# Patient Record
Sex: MALE | Race: WHITE | NOT HISPANIC OR LATINO | Employment: OTHER | URBAN - METROPOLITAN AREA
[De-identification: names, ages, dates, MRNs, and addresses within clinical notes are randomized per-mention and may not be internally consistent; named-entity substitution may affect disease eponyms.]

---

## 2018-01-25 ENCOUNTER — OFFICE VISIT (OUTPATIENT)
Dept: UROLOGY | Facility: MEDICAL CENTER | Age: 73
End: 2018-01-25
Payer: MEDICARE

## 2018-01-25 ENCOUNTER — TELEPHONE (OUTPATIENT)
Dept: UROLOGY | Facility: AMBULATORY SURGERY CENTER | Age: 73
End: 2018-01-25

## 2018-01-25 VITALS
DIASTOLIC BLOOD PRESSURE: 84 MMHG | SYSTOLIC BLOOD PRESSURE: 150 MMHG | HEIGHT: 70 IN | BODY MASS INDEX: 34.36 KG/M2 | WEIGHT: 240 LBS

## 2018-01-25 DIAGNOSIS — N13.8 BPH WITH OBSTRUCTION/LOWER URINARY TRACT SYMPTOMS: ICD-10-CM

## 2018-01-25 DIAGNOSIS — N40.1 BPH WITH OBSTRUCTION/LOWER URINARY TRACT SYMPTOMS: ICD-10-CM

## 2018-01-25 DIAGNOSIS — R33.9 RETENTION, URINE: Primary | ICD-10-CM

## 2018-01-25 DIAGNOSIS — N30.01 ACUTE CYSTITIS WITH HEMATURIA: ICD-10-CM

## 2018-01-25 LAB
SL AMB  POCT GLUCOSE, UA: ABNORMAL
SL AMB LEUKOCYTE ESTERASE,UA: ABNORMAL
SL AMB POCT BILIRUBIN,UA: ABNORMAL
SL AMB POCT BLOOD,UA: ABNORMAL
SL AMB POCT CLARITY,UA: CLEAR
SL AMB POCT COLOR,UA: YELLOW
SL AMB POCT KETONES,UA: ABNORMAL
SL AMB POCT NITRITE,UA: ABNORMAL
SL AMB POCT PH,UA: 6
SL AMB POCT SPECIFIC GRAVITY,UA: 1.02

## 2018-01-25 PROCEDURE — 99204 OFFICE O/P NEW MOD 45 MIN: CPT | Performed by: UROLOGY

## 2018-01-25 PROCEDURE — 51798 US URINE CAPACITY MEASURE: CPT | Performed by: UROLOGY

## 2018-01-25 PROCEDURE — 81002 URINALYSIS NONAUTO W/O SCOPE: CPT | Performed by: UROLOGY

## 2018-01-25 RX ORDER — SULFAMETHOXAZOLE AND TRIMETHOPRIM 800; 160 MG/1; MG/1
TABLET ORAL
COMMUNITY
Start: 2018-01-24 | End: 2018-03-08

## 2018-01-25 NOTE — PROGRESS NOTES
Assessment/Plan:  1  Recent bout of acute cystitis initially treated with Cipro but when symptoms of dysuria frequency or urgency and stranguria persisted the patient was then placed on Bactrim DS with resolution of his symptoms  Will plan renal ultrasound to evaluate the upper urinary tract, postvoid urinary residual volume today was 180 cc which is elevated, we will plan cystourethroscopy in the office in about 6 weeks after data collection  2  BPH with obstructive symptoms-the patient notes a longstanding history of nocturia twice per night weakening of the urinary stream urinary hesitancy and intermittency with double voiding  The patient has had at least 1 bout of acute cystitis  He is currently treated with Cardura 8 mg p o  daily but unfortunately still has an elevated PVR, a recent urinary infection, and persistent obstructive symptoms  3   Retention of urine- cc    No problem-specific Assessment & Plan notes found for this encounter  Diagnoses and all orders for this visit:    Retention, urine  -     POCT urine dip  -     Measure post void residual    BPH with obstruction/lower urinary tract symptoms    Acute cystitis with hematuria    Other orders  -     sulfamethoxazole-trimethoprim (BACTRIM DS) 800-160 mg per tablet;           Subjective:      Patient ID: Brien Quintana is a 71 y o  male  HPI 69-year-old male seen last in this office in October of 2015 for BPH with obstructive symptoms and microscopic hematuria failing to return to the office for cystourethroscopy  The patient developed dysuria frequency urgency stranguria approximately 1 week ago went to the physician who placed him initially on ciprofloxacin but after his symptoms persisted change the prescription to Bactrim DS which result in resolution    On the baseline the patient notes nocturia twice per night with urinary hesitancy intermittency double voiding mild frequency and it weakening of the urinary stream   The patient now presents for urologic evaluation and treatment  The following portions of the patient's history were reviewed and updated as appropriate: allergies, current medications, past family history, past medical history, past social history, past surgical history and problem list     Review of Systems   Constitutional: Positive for appetite change, chills and diaphoresis  HENT: Negative  Eyes: Negative  Respiratory: Negative  Cardiovascular: Negative  Gastrointestinal: Negative  Genitourinary: Positive for decreased urine volume, difficulty urinating, dysuria and frequency  Musculoskeletal:        Low back pain   Skin: Negative  Objective:     Physical Exam   Constitutional: He is oriented to person, place, and time  He appears well-nourished  HENT:   Head: Atraumatic  Eyes: EOM are normal    Neck: Neck supple  Cardiovascular: Normal rate  Pulmonary/Chest: Breath sounds normal  No respiratory distress  He has no wheezes  Abdominal: Bowel sounds are normal  He exhibits no distension  There is no tenderness  There is no guarding  Genitourinary: Testes normal and penis normal  Right testis shows no mass, no swelling and no tenderness  Left testis shows no mass, no swelling and no tenderness  Circumcised  No phimosis or penile tenderness  Neurological: He is oriented to person, place, and time  Skin: Skin is dry  Psychiatric: He has a normal mood and affect  His behavior is normal  Judgment and thought content normal    Vitals reviewed

## 2018-02-08 ENCOUNTER — HOSPITAL ENCOUNTER (OUTPATIENT)
Dept: ULTRASOUND IMAGING | Facility: HOSPITAL | Age: 73
Discharge: HOME | End: 2018-02-08
Attending: UROLOGY
Payer: MEDICARE

## 2018-02-08 ENCOUNTER — APPOINTMENT (OUTPATIENT)
Dept: LAB | Facility: HOSPITAL | Age: 73
End: 2018-02-08
Attending: UROLOGY
Payer: MEDICARE

## 2018-02-08 DIAGNOSIS — N40.1 BPH WITH OBSTRUCTION/LOWER URINARY TRACT SYMPTOMS: ICD-10-CM

## 2018-02-08 DIAGNOSIS — N30.01 ACUTE CYSTITIS WITH HEMATURIA: ICD-10-CM

## 2018-02-08 DIAGNOSIS — N13.8 BPH WITH OBSTRUCTION/LOWER URINARY TRACT SYMPTOMS: ICD-10-CM

## 2018-02-08 LAB
ALBUMIN SERPL BCP-MCNC: 3.7 G/DL (ref 3.5–5)
ALP SERPL-CCNC: 59 U/L (ref 46–116)
ALT SERPL W P-5'-P-CCNC: 35 U/L (ref 12–78)
ANION GAP SERPL CALCULATED.3IONS-SCNC: 7 MMOL/L (ref 4–13)
AST SERPL W P-5'-P-CCNC: 25 U/L (ref 5–45)
BILIRUB SERPL-MCNC: 0.62 MG/DL (ref 0.2–1)
BUN SERPL-MCNC: 13 MG/DL (ref 5–25)
CALCIUM SERPL-MCNC: 9.6 MG/DL (ref 8.3–10.1)
CHLORIDE SERPL-SCNC: 104 MMOL/L (ref 100–108)
CO2 SERPL-SCNC: 31 MMOL/L (ref 21–32)
CREAT SERPL-MCNC: 1.01 MG/DL (ref 0.6–1.3)
GFR SERPL CREATININE-BSD FRML MDRD: 74 ML/MIN/1.73SQ M
GLUCOSE P FAST SERPL-MCNC: 97 MG/DL (ref 65–99)
POTASSIUM SERPL-SCNC: 4.7 MMOL/L (ref 3.5–5.3)
PROT SERPL-MCNC: 7.6 G/DL (ref 6.4–8.2)
SODIUM SERPL-SCNC: 142 MMOL/L (ref 136–145)

## 2018-02-08 PROCEDURE — 84153 ASSAY OF PSA TOTAL: CPT

## 2018-02-08 PROCEDURE — 87086 URINE CULTURE/COLONY COUNT: CPT | Performed by: UROLOGY

## 2018-02-08 PROCEDURE — 51798 US URINE CAPACITY MEASURE: CPT

## 2018-02-08 PROCEDURE — 36415 COLL VENOUS BLD VENIPUNCTURE: CPT

## 2018-02-08 PROCEDURE — 84154 ASSAY OF PSA FREE: CPT

## 2018-02-08 PROCEDURE — 80053 COMPREHEN METABOLIC PANEL: CPT

## 2018-02-09 LAB
BACTERIA UR CULT: NORMAL
PSA FREE MFR SERPL: 13.8 %
PSA FREE SERPL-MCNC: 1.39 NG/ML
PSA SERPL-MCNC: 10.1 NG/ML (ref 0–4)

## 2018-03-08 ENCOUNTER — PROCEDURE VISIT (OUTPATIENT)
Dept: UROLOGY | Facility: MEDICAL CENTER | Age: 73
End: 2018-03-08
Payer: MEDICARE

## 2018-03-08 VITALS
SYSTOLIC BLOOD PRESSURE: 120 MMHG | WEIGHT: 237 LBS | HEIGHT: 70 IN | DIASTOLIC BLOOD PRESSURE: 74 MMHG | BODY MASS INDEX: 33.93 KG/M2

## 2018-03-08 DIAGNOSIS — R97.20 ELEVATED PSA, BETWEEN 10 AND LESS THAN 20 NG/ML: ICD-10-CM

## 2018-03-08 DIAGNOSIS — N35.914 ANTERIOR URETHRAL STRICTURE: ICD-10-CM

## 2018-03-08 DIAGNOSIS — N40.1 BPH WITH OBSTRUCTION/LOWER URINARY TRACT SYMPTOMS: Primary | ICD-10-CM

## 2018-03-08 DIAGNOSIS — N13.8 BPH WITH OBSTRUCTION/LOWER URINARY TRACT SYMPTOMS: Primary | ICD-10-CM

## 2018-03-08 DIAGNOSIS — R33.9 RETENTION OF URINE: ICD-10-CM

## 2018-03-08 LAB
SL AMB  POCT GLUCOSE, UA: NORMAL
SL AMB LEUKOCYTE ESTERASE,UA: NORMAL
SL AMB POCT BILIRUBIN,UA: NORMAL
SL AMB POCT BLOOD,UA: NORMAL
SL AMB POCT CLARITY,UA: CLEAR
SL AMB POCT COLOR,UA: YELLOW
SL AMB POCT KETONES,UA: NORMAL
SL AMB POCT NITRITE,UA: NORMAL
SL AMB POCT PH,UA: 6
SL AMB POCT SPECIFIC GRAVITY,UA: 1.01
SL AMB POCT URINE PROTEIN: NORMAL
SL AMB POCT UROBILINOGEN: 0.2

## 2018-03-08 PROCEDURE — 99214 OFFICE O/P EST MOD 30 MIN: CPT | Performed by: UROLOGY

## 2018-03-08 PROCEDURE — 52281 CYSTOSCOPY AND TREATMENT: CPT | Performed by: UROLOGY

## 2018-03-08 PROCEDURE — 81003 URINALYSIS AUTO W/O SCOPE: CPT | Performed by: UROLOGY

## 2018-03-08 RX ORDER — CIPROFLOXACIN 500 MG/1
500 TABLET, FILM COATED ORAL EVERY 12 HOURS SCHEDULED
Qty: 6 TABLET | Refills: 0 | Status: SHIPPED | OUTPATIENT
Start: 2018-03-08 | End: 2018-03-11

## 2018-03-08 RX ORDER — LEVOFLOXACIN 500 MG/1
TABLET, FILM COATED ORAL
COMMUNITY
Start: 2018-02-20 | End: 2018-03-08

## 2018-03-08 RX ORDER — DOXAZOSIN 8 MG/1
8 TABLET ORAL
Qty: 90 TABLET | Refills: 3 | Status: SHIPPED | OUTPATIENT
Start: 2018-03-08 | End: 2018-04-26

## 2018-03-08 RX ORDER — METHYLPREDNISOLONE 4 MG/1
TABLET ORAL
COMMUNITY
Start: 2018-02-20 | End: 2018-03-22

## 2018-03-08 RX ORDER — DOXAZOSIN 8 MG/1
8 TABLET ORAL
COMMUNITY
End: 2018-03-08 | Stop reason: SDUPTHER

## 2018-03-08 RX ORDER — AZITHROMYCIN 250 MG/1
TABLET, FILM COATED ORAL
COMMUNITY
Start: 2018-02-16 | End: 2018-03-08

## 2018-03-08 NOTE — LETTER
March 8, 2018     Lilliam Acosta  [address] [place] [id number]    Patient: Lenora Martini   YOB: 1945   Date of Visit: 3/8/2018       Dear Dr Arnaldo Coles:    Thank you for referring Yahaira Haas to me for evaluation  Below are my notes for this consultation  If you have questions, please do not hesitate to call me  I look forward to following your patient along with you  Sincerely,        Li Arriaga MD        CC: No Recipients  Li Arriaga MD  3/8/2018 11:23 AM  Sign at close encounter  H&P Exam - Urology   Lenora Martini 71 y o  male MRN: [id number]  Unit/Bed#:  Encounter: [id number]    Assessment/Plan     Assessment:  1  Elevated PSA  The patient had a PSA in excess of 10 as well as a firm area of the prostate at the left apex  Based on both these findings even though the PSA may be elevated due to a recent bout of acute cystitis, prostate biopsy is recommended will be performed  2   BPH with bladder outlet obstructive symptoms  The patient noted relief of the dysuria urgency and frequency but noted persistent weakened urinary stream and incomplete bladder emptying  Witnessed voiding showed a very weak stream something more than a dribble but not with a lot of power with thigh intermittency and some straining  Cystoscopy revealed trilobar hyperplasia of the prostate with a prominent median lobe bladder trabeculation with no bladder lesions  3   Anterior urethral stricture in the fossa navicularis  This was incidentally noted and was dilated easily  4  Recent acute cystitis with successful treatment with antibiotics  5   Recent history of gross hematuria with gross hematuria identified on cystoscopy today  CT urogram will also be obtained  Renal ultrasound revealed normal upper urinary tracts with a large prostate protruding into the bladder with bladder wall thickening       Plan:  1    Transrectal needle biopsy of the prostate under ultrasound guidance  2  TURP  3  CT urogram    History of Present Illness   HPI:  Ronaldo Mishra is a 71 y o  male who presents with urinary retention  The patient noted an obstructive weak urinary stream with multiple bouts of urgency frequency and at least 2 urinary tract infections treated with antibiotics  The patient noted urinary hesitancy intermittency double voiding nocturia 3-4 times per night and at least 1 episode of gross hematuria at the time of his cystoscopy  The patient underwent renal ultrasound which revealed normal upper urinary tracts but did show trilobar hyperplasia of the prostate with bladder wall thickening  The patient underwent cystoscopy in the office today which confirmed a fossa navicularis anterior urethral stricture which was dilated easily as well as trilobar hyperplasia of the prostate with visual occlusion of the bladder outlet  The patient had an elevated PSA of over 10 and this may be due to recent infection however he has a firm left apex of the prostate and therefore prostate biopsy to also be recommended       Review of Systems    Historical Information   History reviewed  No pertinent past medical history  Past Surgical History:   Procedure Laterality Date    REPLACEMENT TOTAL KNEE BILATERAL       Social History   History   Alcohol Use    0 6 oz/week    1 Shots of liquor per week     History   Drug Use No     History   Smoking Status    Former Smoker   Smokeless Tobacco    Never Used     Family History:   Family History   Problem Relation Age of Onset    Family history unknown: Yes       Meds/Allergies   all medications and allergies reviewed  No Known Allergies    Objective   Vitals: Blood pressure 120/74, height 5' 10" (1 778 m), weight 108 kg (237 lb)  [unfilled]    Invasive Devices          No matching active lines, drains, or airways          Physical Exam    Lab Results: I have personally reviewed pertinent reports  Dx:  BPH with obstruction/lower urinary tr    Ref Range & Units 2/8/18  9:53 AM Flag   Prostate Specific Antigen Total 0 0 - 4 0 ng/mL 10 1   H            Comprehensive metabolic panel   Order: [id number]   Status:  Final result   Visible to patient:  No (Inaccessible in MyChart)   Next appt:  None   Dx:  BPH with obstruction/lower urinary tr    Ref Range & Units 2/8/18  9:53 AM   Sodium 136 - 145 mmol/L 142    Potassium 3 5 - 5 3 mmol/L 4 7    Chloride 100 - 108 mmol/L 104    CO2 21 - 32 mmol/L 31    Anion Gap 4 - 13 mmol/L 7    BUN 5 - 25 mg/dL 13    Creatinine 0 60 - 1 30 mg/dL 1 01    Comments: Standardized to IDMS reference method   Glucose, Fasting 65 - 99 mg/dL 97                Imaging: I have personally reviewed pertinent reports  Study Result     RENAL ULTRASOUND     INDICATION: Cystitis and microhematuria  Urinary retention     COMPARISON: None      TECHNIQUE:   Ultrasound of the retroperitoneum was performed with a curvilinear transducer utilizing volumetric sweeps and still imaging techniques       FINDINGS:     KIDNEYS:     Right kidney:  13 9 x 5 6 cm  Normal echogenicity and contour  No suspicious masses detected  No hydronephrosis  No shadowing calculi  No perinephric fluid collections      Left kidney:  12 8 x 6 2 cm  Normal echogenicity and contour  No suspicious masses detected  No hydronephrosis  No shadowing calculi  No perinephric fluid collections      URETERS:  Nonvisualized      BLADDER:   Prevoid bladder volume 246 mL  Post void bladder volume 79 mL  Bladder wall thickening and trabeculation observed  There is a soft tissue mass protruding into the urinary bladder base on the midline measuring up to 3 0 cm x 3 7 cm x 2 2 cm  This appears to be enlarged fibromuscular stroma of the prostate gland           IMPRESSION:  Unremarkable kidneys  Urinary Bladder wall thickening which may be due to cystitis and/or trabeculation    79 mL post void bladder residual        Soft tissue mass in the base of the urinary bladder, likely hypertrophied prostatic anterior fibromuscular stroma protruding into the urinary bladder base      Follow-up hematuria protocol CT advised if hematuria persists  EKG, Pathology, and Other Studies: I have personally reviewed pertinent reports  VTE Prophylaxis: Sequential compression device Daniel Andrae)     Code Status: [unfilled]  Advance Directive and Living Will:      Power of :    POLST:    Cystoscopy  Date/Time: 3/8/2018 11:21 AM  Performed by: Moncho Lea  Authorized by: Moncho Lea     Procedure details: dilation of urethral stricture    Patient tolerance: Patient tolerated the procedure well with no immediate complications    Additional Procedure Details: The patient was taken to the cystoscopy suite placed in the supine and then the male lithotomy position and after being appropriately be prepped with Betadine 2% lidocaine lubricant was injected up the urethra  Initial attempted cystourethroscopy failed because of a fossa navicularis stricture which was easily dilated using female sounds from 8 Western Yamilet up to 22 Western Yamilet size inclusive Keira  Cystourethroscopy was then performed using a flexible cystoscope  Other than the fossa navicularis stricture which was already dilated the remainder the urethra was within normal limits without stricture lesion  The prostatic urethra revealed trilobar hyperplasia of the prostate with visual occlusion of the bladder outlet  There was a prominent median lobe obstructing the bladder outlet  The bladder was mildly to moderately trabeculated without intrinsic lesions or evidence of extrinsic mass compression  Ureteral orifices were normal position and configuration bilaterally with clear efflux bilaterally  The patient tolerated the procedure quite well  Counseling / Coordination of Care  Total floor / unit time spent today 45 minutes    Greater than 50% of total time was spent with the patient and / or family counseling and / or coordination of care  A description of the counseling / coordination of care: Sami Moss

## 2018-03-08 NOTE — PROGRESS NOTES
H&P Exam - Urology   Yuilya Ibarra 71 y o  male MRN: [id number]  Unit/Bed#:  Encounter: [id number]    Assessment/Plan     Assessment:  1  Elevated PSA  The patient had a PSA in excess of 10 as well as a firm area of the prostate at the left apex  Based on both these findings even though the PSA may be elevated due to a recent bout of acute cystitis, prostate biopsy is recommended will be performed  2   BPH with bladder outlet obstructive symptoms  The patient noted relief of the dysuria urgency and frequency but noted persistent weakened urinary stream and incomplete bladder emptying  Witnessed voiding showed a very weak stream something more than a dribble but not with a lot of power with thigh intermittency and some straining  Cystoscopy revealed trilobar hyperplasia of the prostate with a prominent median lobe bladder trabeculation with no bladder lesions  3   Anterior urethral stricture in the fossa navicularis  This was incidentally noted and was dilated easily  4  Recent acute cystitis with successful treatment with antibiotics  5   Recent history of gross hematuria with gross hematuria identified on cystoscopy today  CT urogram will also be obtained  Renal ultrasound revealed normal upper urinary tracts with a large prostate protruding into the bladder with bladder wall thickening       Plan:  1  Transrectal needle biopsy of the prostate under ultrasound guidance  2  TURP  3  CT urogram    History of Present Illness   HPI:  Yuliya Ibarra is a 71 y o  male who presents with urinary retention  The patient noted an obstructive weak urinary stream with multiple bouts of urgency frequency and at least 2 urinary tract infections treated with antibiotics  The patient noted urinary hesitancy intermittency double voiding nocturia 3-4 times per night and at least 1 episode of gross hematuria at the time of his cystoscopy    The patient underwent renal ultrasound which revealed normal upper urinary tracts but did show trilobar hyperplasia of the prostate with bladder wall thickening  The patient underwent cystoscopy in the office today which confirmed a fossa navicularis anterior urethral stricture which was dilated easily as well as trilobar hyperplasia of the prostate with visual occlusion of the bladder outlet  The patient had an elevated PSA of over 10 and this may be due to recent infection however he has a firm left apex of the prostate and therefore prostate biopsy to also be recommended       Review of Systems    Historical Information   History reviewed  No pertinent past medical history  Past Surgical History:   Procedure Laterality Date    REPLACEMENT TOTAL KNEE BILATERAL       Social History   History   Alcohol Use    0 6 oz/week    1 Shots of liquor per week     History   Drug Use No     History   Smoking Status    Former Smoker   Smokeless Tobacco    Never Used     Family History:   Family History   Problem Relation Age of Onset    Family history unknown: Yes       Meds/Allergies   all medications and allergies reviewed  No Known Allergies    Objective   Vitals: Blood pressure 120/74, height 5' 10" (1 778 m), weight 108 kg (237 lb)  [unfilled]    Invasive Devices          No matching active lines, drains, or airways          Physical Exam    Lab Results: I have personally reviewed pertinent reports  Dx:  BPH with obstruction/lower urinary tr    Ref Range & Units 2/8/18  9:53 AM Flag   Prostate Specific Antigen Total 0 0 - 4 0 ng/mL 10 1   H            Comprehensive metabolic panel   Order: [id number]   Status:  Final result   Visible to patient:  No (Inaccessible in MyChart)   Next appt:  None   Dx:  BPH with obstruction/lower urinary tr        Ref Range & Units 2/8/18  9:53 AM   Sodium 136 - 145 mmol/L 142    Potassium 3 5 - 5 3 mmol/L 4 7    Chloride 100 - 108 mmol/L 104    CO2 21 - 32 mmol/L 31    Anion Gap 4 - 13 mmol/L 7    BUN 5 - 25 mg/dL 13    Creatinine 0 60 - 1 30 mg/dL 1 01    Comments: Standardized to IDMS reference method   Glucose, Fasting 65 - 99 mg/dL 97                Imaging: I have personally reviewed pertinent reports  Study Result     RENAL ULTRASOUND     INDICATION: Cystitis and microhematuria  Urinary retention     COMPARISON: None      TECHNIQUE:   Ultrasound of the retroperitoneum was performed with a curvilinear transducer utilizing volumetric sweeps and still imaging techniques       FINDINGS:     KIDNEYS:     Right kidney:  13 9 x 5 6 cm  Normal echogenicity and contour  No suspicious masses detected  No hydronephrosis  No shadowing calculi  No perinephric fluid collections      Left kidney:  12 8 x 6 2 cm  Normal echogenicity and contour  No suspicious masses detected  No hydronephrosis  No shadowing calculi  No perinephric fluid collections      URETERS:  Nonvisualized      BLADDER:   Prevoid bladder volume 246 mL  Post void bladder volume 79 mL  Bladder wall thickening and trabeculation observed  There is a soft tissue mass protruding into the urinary bladder base on the midline measuring up to 3 0 cm x 3 7 cm x 2 2 cm  This appears to be enlarged fibromuscular stroma of the prostate gland           IMPRESSION:  Unremarkable kidneys  Urinary Bladder wall thickening which may be due to cystitis and/or trabeculation  79 mL post void bladder residual        Soft tissue mass in the base of the urinary bladder, likely hypertrophied prostatic anterior fibromuscular stroma protruding into the urinary bladder base      Follow-up hematuria protocol CT advised if hematuria persists  EKG, Pathology, and Other Studies: I have personally reviewed pertinent reports      VTE Prophylaxis: Sequential compression device [place])     Code Status: [unfilled]  Advance Directive and Living Will:      Power of :    POLST:    Cystoscopy  Date/Time: 3/8/2018 11:21 AM  Performed by: Leo Gilmore  Authorized by: Leo Gilmore Procedure details: dilation of urethral stricture    Patient tolerance: Patient tolerated the procedure well with no immediate complications    Additional Procedure Details: The patient was taken to the cystoscopy suite placed in the supine and then the male lithotomy position and after being appropriately be prepped with Betadine 2% lidocaine lubricant was injected up the urethra  Initial attempted cystourethroscopy failed because of a fossa navicularis stricture which was easily dilated using female sounds from 8 [place] Yamilet up to 22 Western Yamilet size inclusive Keira  Cystourethroscopy was then performed using a flexible cystoscope  Other than the fossa navicularis stricture which was already dilated the remainder the urethra was within normal limits without stricture lesion  The prostatic urethra revealed trilobar hyperplasia of the prostate with visual occlusion of the bladder outlet  There was a prominent median lobe obstructing the bladder outlet  The bladder was mildly to moderately trabeculated without intrinsic lesions or evidence of extrinsic mass compression  Ureteral orifices were normal position and configuration bilaterally with clear efflux bilaterally  The patient tolerated the procedure quite well  Counseling / Coordination of Care  Total floor / unit time spent today 45 minutes  Greater than 50% of total time was spent with the patient and / or family counseling and / or coordination of care  A description of the counseling / coordination of care: Glendy Wilson

## 2018-03-20 ENCOUNTER — PROCEDURE VISIT (OUTPATIENT)
Dept: UROLOGY | Facility: MEDICAL CENTER | Age: 73
End: 2018-03-20
Payer: MEDICARE

## 2018-03-20 VITALS
SYSTOLIC BLOOD PRESSURE: 148 MMHG | DIASTOLIC BLOOD PRESSURE: 84 MMHG | BODY MASS INDEX: 34.36 KG/M2 | HEIGHT: 70 IN | WEIGHT: 240 LBS

## 2018-03-20 DIAGNOSIS — N13.8 BPH WITH OBSTRUCTION/LOWER URINARY TRACT SYMPTOMS: ICD-10-CM

## 2018-03-20 DIAGNOSIS — N40.1 BPH WITH OBSTRUCTION/LOWER URINARY TRACT SYMPTOMS: ICD-10-CM

## 2018-03-20 DIAGNOSIS — R97.20 ELEVATED PSA, BETWEEN 10 AND LESS THAN 20 NG/ML: Primary | ICD-10-CM

## 2018-03-20 DIAGNOSIS — R97.20 ELEVATED PSA: Primary | ICD-10-CM

## 2018-03-20 PROCEDURE — 55700 PR BIOPSY OF PROSTATE,NEEDLE/PUNCH: CPT | Performed by: UROLOGY

## 2018-03-20 PROCEDURE — 76872 US TRANSRECTAL: CPT | Performed by: UROLOGY

## 2018-03-20 PROCEDURE — 88344 IMHCHEM/IMCYTCHM EA MLT ANTB: CPT | Performed by: PATHOLOGY

## 2018-03-20 PROCEDURE — G0416 PROSTATE BIOPSY, ANY MTHD: HCPCS | Performed by: PATHOLOGY

## 2018-03-20 PROCEDURE — 76942 ECHO GUIDE FOR BIOPSY: CPT | Performed by: UROLOGY

## 2018-03-20 PROCEDURE — 99214 OFFICE O/P EST MOD 30 MIN: CPT | Performed by: UROLOGY

## 2018-03-20 RX ORDER — CIPROFLOXACIN 500 MG/1
TABLET, FILM COATED ORAL
COMMUNITY
Start: 2018-03-12 | End: 2018-03-22

## 2018-03-20 NOTE — PROGRESS NOTES
Assessment/Plan:   1  BPH with obstructive symptoms  The patient has a prominent median lobe as well as bilobar enlargement of the lateral lobes of the prostate with significant bladder outlet obstructive symptoms despite being on maximal doses of alpha blocker  The patient will require TURP in the future  2   Elevated PSA-the patient presents today for transrectal needle biopsy the prostate under ultrasound guidance  I again did reveal his renal ultrasound findings and explained those  He will return in 2 weeks to discuss findings on prostate biopsy  Diagnoses and all orders for this visit:    Elevated PSA, between 10 and less than 20 ng/ml    BPH with obstruction/lower urinary tract symptoms    Other orders  -     ciprofloxacin (CIPRO) 500 mg tablet;           Subjective:     Patient ID: Lucio Meza is a 71 y o  male  HPI 78-year-old male with an elevated PSA in excess of 10 as well as a firm left posterior aspect of the prostate on LIN presents for prostate biopsy today  In addition the patient has obstructive voiding symptomatology despite alpha blockade  Trilobar hyperplasia was identified on cystoscopy in the patient will be considered for TURP in the future  Review of Systems   Musculoskeletal: Positive for arthralgias  All other systems reviewed and are negative  Objective:     Physical Exam   Constitutional: He is oriented to person, place, and time  He appears well-developed and well-nourished  No distress  HENT:   Head: Atraumatic  Eyes: EOM are normal    Neck: Neck supple  Cardiovascular: Normal rate and regular rhythm  Pulmonary/Chest: Effort normal  No respiratory distress  Abdominal: Soft  He exhibits no distension  Genitourinary: Penis normal    Neurological: He is alert and oriented to person, place, and time  Psychiatric: He has a normal mood and affect   His behavior is normal  Judgment and thought content normal           Biopsy prostate Date/Time 3/20/2018 5:39 PM     Performed by  Sam Rodriguez     Authorized by Sam Rodriguez       Consent: Verbal consent obtained  Risks and benefits: risks, benefits and alternatives were discussed  Consent given by: patient  Patient understanding: patient states understanding of the procedure being performed  Patient consent: the patient's understanding of the procedure matches consent given  Patient identity confirmed: verbally with patient  Time out: Immediately prior to procedure a "time out" was called to verify the correct patient, procedure, equipment, support staff and site/side marked as required  Local anesthesia used: yes      Anesthesia: local infiltration and nerve block     Anesthesia   Local anesthesia used: yes  Local Anesthetic: lidocaine 1% without epinephrine     Sedation   Patient sedated: no      Specimen: yes    Culture: no   Procedure Details   Procedure Notes: The patient was placed on the exam table in the right flank up position with knees to chest   The ultrasound probe was lubricated and placed per rectum against the prostate  Prostate size was estimated at 28 g by ultrasound measurement  No hypoechoic regions of the peripheral zone were in countered  Prostate did extend through a median lobe into the urinary bladder at with a mass effect consistent with trilobar hyperplasia  After performing a pelvic plexus block using lidocaine 1% bilaterally at the tips of the seminal vesicles apical lidocaine 1% was also deposited bilaterally  After anesthesia was obtained sextant biopsies with 2 cores per sextant 1 medial and 1 lateral were obtained for a total of 12 core biopsies  There was no visible bleeding the patient tolerated the procedure well  He was discharged from the office in good condition    Patient Transportation: confirmed  Patient tolerance: Patient tolerated the procedure well with no immediate complications

## 2018-03-20 NOTE — PATIENT INSTRUCTIONS
Prostate Gland Needle Biopsy   WHAT YOU NEED TO KNOW:   A prostate gland needle biopsy is a procedure to remove samples of tissue from your prostate gland  The prostate is a gland in men located just below the bladder and surrounds the urethra (tube that carries urine out of the body)  After the samples are removed, they are sent to a lab and tested for cancer  DISCHARGE INSTRUCTIONS:   Medicines:   · Antibiotics: This medicine is given to help treat or prevent an infection caused by bacteria  · Pain medicine: You may be given a prescription medicine to decrease pain  Do not wait until the pain is severe before you take this medicine  · Take your medicine as directed  Contact your healthcare provider if you think your medicine is not helping or if you have side effects  Tell him or her if you are allergic to any medicine  Keep a list of the medicines, vitamins, and herbs you take  Include the amounts, and when and why you take them  Bring the list or the pill bottles to follow-up visits  Carry your medicine list with you in case of an emergency  Follow up with your healthcare provider or urologist as directed: You may need to return for more tests or procedures  Write down your questions so you remember to ask them during your visits  Self-care:   · Eat healthy foods:  Healthy foods include fruits, vegetables, whole-grain breads, low-fat dairy products, beans, lean meats, and fish  Eat foods low in animal fat and saturated fats to help decrease your risk for prostate cancer  · Limit alcohol:  You should limit alcohol to 2 drinks a day  A drink of alcohol is 12 ounces of beer, 5 ounces of wine, or 1½ ounces of liquor  Contact your healthcare provider or urologist if:   · You cannot get an erection  · You feel pain or burning when you urinate  · You feel weak, and your face is hot and red  · You have a fever or chills      · You see blood in your urine, bowel movements, or semen     · Your urine is cloudy or smells foul  · You have questions or concerns about your condition or care  Seek care immediately or call 911 if:   · You bleed from your rectum  · You urinate very little or not at all  · You have pain from your procedure that gets worse, even after you take pain medicine  © 2017 [employer] Information is for End User's use only and may not be sold, redistributed or otherwise used for commercial purposes  All illustrations and images included in CareNotes® are the copyrighted property of [employer]  or [address]  The above information is an  only  It is not intended as medical advice for individual conditions or treatments  Talk to your doctor, nurse or pharmacist before following any medical regimen to see if it is safe and effective for you

## 2018-03-20 NOTE — LETTER
March 20, 2018     Wilder Johnson MD  [address] [address]    Patient: Deanna Xei   YOB: 1945   Date of Visit: 3/20/2018       Dear Dr Momo Munoz:    Thank you for referring Valeria Guerra to me for evaluation  Below are my notes for this consultation  If you have questions, please do not hesitate to call me  I look forward to following your patient along with you  Sincerely,        Jo Ann Uribe MD        CC: No Recipients  Jo Ann Uribe MD  3/20/2018  5:42 PM  Sign at close encounter  Assessment/Plan:   1  BPH with obstructive symptoms  The patient has a prominent median lobe as well as bilobar enlargement of the lateral lobes of the prostate with significant bladder outlet obstructive symptoms despite being on maximal doses of alpha blocker  The patient will require TURP in the future  2   Elevated PSA-the patient presents today for transrectal needle biopsy the prostate under ultrasound guidance  I again did reveal his renal ultrasound findings and explained those  He will return in 2 weeks to discuss findings on prostate biopsy  Diagnoses and all orders for this visit:    Elevated PSA, between 10 and less than 20 ng/ml    BPH with obstruction/lower urinary tract symptoms    Other orders  -     ciprofloxacin (CIPRO) 500 mg tablet;           Subjective:     Patient ID: Deanna Xie is a 71 y o  male  HPI 71-year-old male with an elevated PSA in excess of 10 as well as a firm left posterior aspect of the prostate on LIN presents for prostate biopsy today  In addition the patient has obstructive voiding symptomatology despite alpha blockade  Trilobar hyperplasia was identified on cystoscopy in the patient will be considered for TURP in the future  Review of Systems   Musculoskeletal: Positive for arthralgias  All other systems reviewed and are negative          Objective:     Physical Exam   Constitutional: He is oriented to person, place, and time  He appears well-developed and well-nourished  No distress  HENT:   Head: Atraumatic  Eyes: EOM are normal    Neck: Neck supple  Cardiovascular: Normal rate and regular rhythm  Pulmonary/Chest: Effort normal  No respiratory distress  Abdominal: Soft  He exhibits no distension  Genitourinary: Penis normal    Neurological: He is alert and oriented to person, place, and time  Psychiatric: He has a normal mood and affect  His behavior is normal  Judgment and thought content normal           Biopsy prostate     Date/Time 3/20/2018 5:39 PM     Performed by  Dwyane Favre by Bridget Machado       Consent: Verbal consent obtained  Risks and benefits: risks, benefits and alternatives were discussed  Consent given by: patient  Patient understanding: patient states understanding of the procedure being performed  Patient consent: the patient's understanding of the procedure matches consent given  Patient identity confirmed: verbally with patient  Time out: Immediately prior to procedure a "time out" was called to verify the correct patient, procedure, equipment, support staff and site/side marked as required  Local anesthesia used: yes      Anesthesia: local infiltration and nerve block     Anesthesia   Local anesthesia used: yes  Local Anesthetic: lidocaine 1% without epinephrine     Sedation   Patient sedated: no      Specimen: yes    Culture: no   Procedure Details   Procedure Notes: The patient was placed on the exam table in the right flank up position with knees to chest   The ultrasound probe was lubricated and placed per rectum against the prostate  Prostate size was estimated at 28 g by ultrasound measurement  No hypoechoic regions of the peripheral zone were in countered  Prostate did extend through a median lobe into the urinary bladder at with a mass effect consistent with trilobar hyperplasia    After performing a pelvic plexus block using lidocaine 1% bilaterally at the tips of the seminal vesicles apical lidocaine 1% was also deposited bilaterally  After anesthesia was obtained sextant biopsies with 2 cores per sextant 1 medial and 1 lateral were obtained for a total of 12 core biopsies  There was no visible bleeding the patient tolerated the procedure well  He was discharged from the office in good condition    Patient Transportation: confirmed  Patient tolerance: Patient tolerated the procedure well with no immediate complications

## 2018-03-22 ENCOUNTER — HOSPITAL ENCOUNTER (OUTPATIENT)
Dept: NON INVASIVE DIAGNOSTICS | Facility: HOSPITAL | Age: 73
Discharge: HOME | End: 2018-03-22
Attending: UROLOGY
Payer: MEDICARE

## 2018-03-22 ENCOUNTER — APPOINTMENT (OUTPATIENT)
Dept: LAB | Facility: HOSPITAL | Age: 73
End: 2018-03-22
Attending: UROLOGY
Payer: MEDICARE

## 2018-03-22 ENCOUNTER — APPOINTMENT (OUTPATIENT)
Dept: PREADMISSION TESTING | Facility: HOSPITAL | Age: 73
End: 2018-03-22
Payer: MEDICARE

## 2018-03-22 ENCOUNTER — ANESTHESIA EVENT (OUTPATIENT)
Dept: PERIOP | Facility: HOSPITAL | Age: 73
End: 2018-03-22
Payer: MEDICARE

## 2018-03-22 DIAGNOSIS — N13.8 BPH WITH URINARY OBSTRUCTION: ICD-10-CM

## 2018-03-22 DIAGNOSIS — N40.1 BPH WITH URINARY OBSTRUCTION: ICD-10-CM

## 2018-03-22 DIAGNOSIS — Z79.01 LONG-TERM (CURRENT) USE OF ANTICOAGULANTS: ICD-10-CM

## 2018-03-22 DIAGNOSIS — Z01.812 PRE-OPERATIVE LABORATORY EXAMINATION: ICD-10-CM

## 2018-03-22 DIAGNOSIS — R39.89 SUSPECTED UTI: ICD-10-CM

## 2018-03-22 DIAGNOSIS — Z01.810 PRE-OPERATIVE CARDIOVASCULAR EXAMINATION: ICD-10-CM

## 2018-03-22 LAB
ABO GROUP BLD: NORMAL
ANION GAP SERPL CALCULATED.3IONS-SCNC: 9 MMOL/L (ref 4–13)
APTT PPP: 31 SECONDS (ref 23–35)
ATRIAL RATE: 100 BPM
BASOPHILS # BLD AUTO: 0.01 THOUSANDS/ΜL (ref 0–0.1)
BASOPHILS NFR BLD AUTO: 0 % (ref 0–1)
BLD GP AB SCN SERPL QL: NEGATIVE
BUN SERPL-MCNC: 16 MG/DL (ref 5–25)
CALCIUM SERPL-MCNC: 9.6 MG/DL (ref 8.3–10.1)
CHLORIDE SERPL-SCNC: 103 MMOL/L (ref 100–108)
CO2 SERPL-SCNC: 30 MMOL/L (ref 21–32)
CREAT SERPL-MCNC: 1.11 MG/DL (ref 0.6–1.3)
EOSINOPHIL # BLD AUTO: 0.21 THOUSAND/ΜL (ref 0–0.61)
EOSINOPHIL NFR BLD AUTO: 3 % (ref 0–6)
ERYTHROCYTE [DISTWIDTH] IN BLOOD BY AUTOMATED COUNT: 14.5 % (ref 11.6–15.1)
GFR SERPL CREATININE-BSD FRML MDRD: 66 ML/MIN/1.73SQ M
GLUCOSE SERPL-MCNC: 96 MG/DL (ref 65–140)
HCT VFR BLD AUTO: 43.5 % (ref 36.5–49.3)
HGB BLD-MCNC: 14.6 G/DL (ref 12–17)
INR PPP: 1.03 (ref 0.86–1.16)
LYMPHOCYTES # BLD AUTO: 1.85 THOUSANDS/ΜL (ref 0.6–4.47)
LYMPHOCYTES NFR BLD AUTO: 29 % (ref 14–44)
MCH RBC QN AUTO: 30.4 PG (ref 26.8–34.3)
MCHC RBC AUTO-ENTMCNC: 33.6 G/DL (ref 31.4–37.4)
MCV RBC AUTO: 90 FL (ref 82–98)
MONOCYTES # BLD AUTO: 0.75 THOUSAND/ΜL (ref 0.17–1.22)
MONOCYTES NFR BLD AUTO: 12 % (ref 4–12)
NEUTROPHILS # BLD AUTO: 3.63 THOUSANDS/ΜL (ref 1.85–7.62)
NEUTS SEG NFR BLD AUTO: 56 % (ref 43–75)
NRBC BLD AUTO-RTO: 0 /100 WBCS
P AXIS: 34 DEGREES
PLATELET # BLD AUTO: 175 THOUSANDS/UL (ref 149–390)
PMV BLD AUTO: 10.5 FL (ref 8.9–12.7)
POTASSIUM SERPL-SCNC: 4.2 MMOL/L (ref 3.5–5.3)
PR INTERVAL: 152 MS
PROTHROMBIN TIME: 13.5 SECONDS (ref 12.1–14.4)
QRS AXIS: -11 DEGREES
QRSD INTERVAL: 96 MS
QT INTERVAL: 344 MS
QTC INTERVAL: 443 MS
RBC # BLD AUTO: 4.81 MILLION/UL (ref 3.88–5.62)
RH BLD: POSITIVE
SODIUM SERPL-SCNC: 142 MMOL/L (ref 136–145)
SPECIMEN EXPIRATION DATE: NORMAL
T WAVE AXIS: 35 DEGREES
VENTRICULAR RATE: 100 BPM
WBC # BLD AUTO: 6.45 THOUSAND/UL (ref 4.31–10.16)

## 2018-03-22 PROCEDURE — 93010 ELECTROCARDIOGRAM REPORT: CPT | Performed by: INTERNAL MEDICINE

## 2018-03-22 PROCEDURE — 80048 BASIC METABOLIC PNL TOTAL CA: CPT

## 2018-03-22 PROCEDURE — 85610 PROTHROMBIN TIME: CPT

## 2018-03-22 PROCEDURE — 85025 COMPLETE CBC W/AUTO DIFF WBC: CPT

## 2018-03-22 PROCEDURE — 85730 THROMBOPLASTIN TIME PARTIAL: CPT

## 2018-03-22 PROCEDURE — 93005 ELECTROCARDIOGRAM TRACING: CPT | Performed by: UROLOGY

## 2018-03-22 PROCEDURE — 86901 BLOOD TYPING SEROLOGIC RH(D): CPT

## 2018-03-22 PROCEDURE — 87086 URINE CULTURE/COLONY COUNT: CPT

## 2018-03-22 PROCEDURE — 36415 COLL VENOUS BLD VENIPUNCTURE: CPT

## 2018-03-22 PROCEDURE — 86850 RBC ANTIBODY SCREEN: CPT

## 2018-03-22 PROCEDURE — 86900 BLOOD TYPING SEROLOGIC ABO: CPT

## 2018-03-22 RX ORDER — RANITIDINE 150 MG/1
150 CAPSULE ORAL DAILY
COMMUNITY
End: 2019-12-31 | Stop reason: ALTCHOICE

## 2018-03-22 RX ORDER — MULTIVITAMIN
1 CAPSULE ORAL DAILY
COMMUNITY
End: 2018-08-31 | Stop reason: ALTCHOICE

## 2018-03-22 RX ORDER — VITAMIN B COMPLEX
1 CAPSULE ORAL DAILY
COMMUNITY
End: 2018-08-31 | Stop reason: ALTCHOICE

## 2018-03-22 RX ORDER — SODIUM CHLORIDE 9 MG/ML
125 INJECTION, SOLUTION INTRAVENOUS CONTINUOUS
Status: CANCELLED | OUTPATIENT
Start: 2018-03-30

## 2018-03-22 NOTE — ANESTHESIA PREPROCEDURE EVALUATION
Review of Systems/Medical History  Patient summary reviewed  Chart reviewed  No history of anesthetic complications     Cardiovascular  EKG reviewed,   Comment: RBBB,  Pulmonary  Smoker ex-smoker  ,   Comment: 68 pk years  Quit 21 yrs ago     GI/Hepatic    GERD (with zantac) well controlled,        Prostatic disorder, benign prostatic hyperplasia       Endo/Other    Obesity    GYN       Hematology  Negative hematology ROS      Musculoskeletal    Arthritis     Neurology  Negative neurology ROS      Psychology   Negative psychology ROS              Physical Exam    Airway    Mallampati score: I  TM Distance: >3 FB  Neck ROM: full     Dental   upper dentures,     Cardiovascular  Cardiovascular exam normal    Pulmonary  Pulmonary exam normal     Other Findings        Anesthesia Plan  ASA Score- 2     Anesthesia Type- general with ASA Monitors  Additional Monitors:   Airway Plan:         Plan Factors-    Induction- intravenous  Postoperative Plan-     Informed Consent- Anesthetic plan and risks discussed with patient

## 2018-03-22 NOTE — PRE-PROCEDURE INSTRUCTIONS
Pre-Surgery Instructions:   Medication Instructions    aspirin (ECOTRIN) 325 mg EC tablet Patient was instructed by Physician and understands   b complex vitamins capsule Patient was instructed by Physician and understands   cyanocobalamin 100 MCG tablet Patient was instructed by Physician and understands   doxazosin (CARDURA) 8 MG tablet Patient was instructed by Physician and understands   Multiple Vitamin (MULTIVITAMIN) capsule Patient was instructed by Physician and understands   ranitidine (ZANTAC) 150 MG capsule Patient was instructed by Physician and understands   VENTOLIN  (90 Base) MCG/ACT inhaler Patient was instructed by Physician and understands  Seen by Dr Petros Rubin and was told to stop Aspirin, NSAIDS and supplements one week preop and on DOS with sip of water is to take Zantac and may use inhaler

## 2018-03-23 LAB — BACTERIA UR CULT: NORMAL

## 2018-03-26 ENCOUNTER — TELEPHONE (OUTPATIENT)
Dept: UROLOGY | Facility: AMBULATORY SURGERY CENTER | Age: 73
End: 2018-03-26

## 2018-03-26 NOTE — TELEPHONE ENCOUNTER
Pt asking if bx results received    I informed him no final report yet, we will let him know when received

## 2018-03-30 ENCOUNTER — ANESTHESIA (OUTPATIENT)
Dept: PERIOP | Facility: HOSPITAL | Age: 73
End: 2018-03-30
Payer: MEDICARE

## 2018-03-30 ENCOUNTER — HOSPITAL ENCOUNTER (OUTPATIENT)
Facility: HOSPITAL | Age: 73
Setting detail: OUTPATIENT SURGERY
Discharge: HOME | End: 2018-03-31
Attending: UROLOGY | Admitting: UROLOGY
Payer: MEDICARE

## 2018-03-30 DIAGNOSIS — N13.8 BPH WITH URINARY OBSTRUCTION: ICD-10-CM

## 2018-03-30 DIAGNOSIS — N40.1 BPH WITH URINARY OBSTRUCTION: ICD-10-CM

## 2018-03-30 PROCEDURE — 88344 IMHCHEM/IMCYTCHM EA MLT ANTB: CPT | Performed by: PATHOLOGY

## 2018-03-30 PROCEDURE — 88341 IMHCHEM/IMCYTCHM EA ADD ANTB: CPT | Performed by: PATHOLOGY

## 2018-03-30 PROCEDURE — 88305 TISSUE EXAM BY PATHOLOGIST: CPT | Performed by: PATHOLOGY

## 2018-03-30 PROCEDURE — 52601 PROSTATECTOMY (TURP): CPT | Performed by: UROLOGY

## 2018-03-30 PROCEDURE — 88342 IMHCHEM/IMCYTCHM 1ST ANTB: CPT | Performed by: PATHOLOGY

## 2018-03-30 RX ORDER — PROPOFOL 10 MG/ML
INJECTION, EMULSION INTRAVENOUS AS NEEDED
Status: DISCONTINUED | OUTPATIENT
Start: 2018-03-30 | End: 2018-03-30 | Stop reason: SURG

## 2018-03-30 RX ORDER — DOCUSATE SODIUM 100 MG/1
100 CAPSULE, LIQUID FILLED ORAL 2 TIMES DAILY
Status: DISCONTINUED | OUTPATIENT
Start: 2018-03-30 | End: 2018-03-31 | Stop reason: HOSPADM

## 2018-03-30 RX ORDER — MEPERIDINE HYDROCHLORIDE 50 MG/ML
12.5 INJECTION INTRAMUSCULAR; INTRAVENOUS; SUBCUTANEOUS ONCE AS NEEDED
Status: DISCONTINUED | OUTPATIENT
Start: 2018-03-30 | End: 2018-03-30 | Stop reason: HOSPADM

## 2018-03-30 RX ORDER — SODIUM CHLORIDE 9 MG/ML
125 INJECTION, SOLUTION INTRAVENOUS CONTINUOUS
Status: DISCONTINUED | OUTPATIENT
Start: 2018-03-30 | End: 2018-03-30

## 2018-03-30 RX ORDER — SORBITOL 30 G/1000ML
IRRIGANT IRRIGATION AS NEEDED
Status: DISCONTINUED | OUTPATIENT
Start: 2018-03-30 | End: 2018-03-30 | Stop reason: HOSPADM

## 2018-03-30 RX ORDER — FAMOTIDINE 20 MG/1
20 TABLET, FILM COATED ORAL DAILY
Status: DISCONTINUED | OUTPATIENT
Start: 2018-03-30 | End: 2018-03-31 | Stop reason: HOSPADM

## 2018-03-30 RX ORDER — FUROSEMIDE 10 MG/ML
INJECTION INTRAMUSCULAR; INTRAVENOUS AS NEEDED
Status: DISCONTINUED | OUTPATIENT
Start: 2018-03-30 | End: 2018-03-30 | Stop reason: SURG

## 2018-03-30 RX ORDER — DOXAZOSIN 2 MG/1
8 TABLET ORAL
Status: DISCONTINUED | OUTPATIENT
Start: 2018-03-30 | End: 2018-03-31 | Stop reason: HOSPADM

## 2018-03-30 RX ORDER — UBIDECARENONE 75 MG
100 CAPSULE ORAL DAILY
Status: DISCONTINUED | OUTPATIENT
Start: 2018-03-30 | End: 2018-03-31 | Stop reason: HOSPADM

## 2018-03-30 RX ORDER — ACETAMINOPHEN 325 MG/1
650 TABLET ORAL EVERY 6 HOURS PRN
Status: DISCONTINUED | OUTPATIENT
Start: 2018-03-30 | End: 2018-03-31 | Stop reason: HOSPADM

## 2018-03-30 RX ORDER — SULFAMETHOXAZOLE AND TRIMETHOPRIM 800; 160 MG/1; MG/1
1 TABLET ORAL DAILY
Status: DISCONTINUED | OUTPATIENT
Start: 2018-03-30 | End: 2018-03-31 | Stop reason: HOSPADM

## 2018-03-30 RX ORDER — ROCURONIUM BROMIDE 10 MG/ML
INJECTION, SOLUTION INTRAVENOUS AS NEEDED
Status: DISCONTINUED | OUTPATIENT
Start: 2018-03-30 | End: 2018-03-30 | Stop reason: SURG

## 2018-03-30 RX ORDER — MIDAZOLAM HYDROCHLORIDE 1 MG/ML
INJECTION INTRAMUSCULAR; INTRAVENOUS AS NEEDED
Status: DISCONTINUED | OUTPATIENT
Start: 2018-03-30 | End: 2018-03-30 | Stop reason: SURG

## 2018-03-30 RX ORDER — ONDANSETRON 2 MG/ML
4 INJECTION INTRAMUSCULAR; INTRAVENOUS ONCE AS NEEDED
Status: DISCONTINUED | OUTPATIENT
Start: 2018-03-30 | End: 2018-03-30 | Stop reason: HOSPADM

## 2018-03-30 RX ORDER — ONDANSETRON 2 MG/ML
4 INJECTION INTRAMUSCULAR; INTRAVENOUS EVERY 6 HOURS PRN
Status: DISCONTINUED | OUTPATIENT
Start: 2018-03-30 | End: 2018-03-31 | Stop reason: HOSPADM

## 2018-03-30 RX ORDER — SULFAMETHOXAZOLE AND TRIMETHOPRIM 800; 160 MG/1; MG/1
1 TABLET ORAL DAILY
Qty: 10 TABLET | Refills: 0 | Status: SHIPPED | OUTPATIENT
Start: 2018-03-30 | End: 2018-04-09

## 2018-03-30 RX ORDER — MAGNESIUM HYDROXIDE 1200 MG/15ML
LIQUID ORAL AS NEEDED
Status: DISCONTINUED | OUTPATIENT
Start: 2018-03-30 | End: 2018-03-30 | Stop reason: HOSPADM

## 2018-03-30 RX ORDER — OXYCODONE HYDROCHLORIDE AND ACETAMINOPHEN 5; 325 MG/1; MG/1
1 TABLET ORAL EVERY 4 HOURS PRN
Qty: 10 TABLET | Refills: 0 | Status: SHIPPED | OUTPATIENT
Start: 2018-03-30 | End: 2018-04-09

## 2018-03-30 RX ORDER — ACETAMINOPHEN 325 MG/1
650 TABLET ORAL EVERY 6 HOURS PRN
COMMUNITY
End: 2019-12-31 | Stop reason: ALTCHOICE

## 2018-03-30 RX ORDER — CHOLECALCIFEROL (VITAMIN D3) 10 MCG
1 TABLET ORAL DAILY
Status: DISCONTINUED | OUTPATIENT
Start: 2018-03-30 | End: 2018-03-31 | Stop reason: HOSPADM

## 2018-03-30 RX ORDER — OXYCODONE HYDROCHLORIDE AND ACETAMINOPHEN 5; 325 MG/1; MG/1
1 TABLET ORAL EVERY 4 HOURS PRN
Status: DISCONTINUED | OUTPATIENT
Start: 2018-03-30 | End: 2018-03-31 | Stop reason: HOSPADM

## 2018-03-30 RX ORDER — GLYCOPYRROLATE 0.2 MG/ML
INJECTION INTRAMUSCULAR; INTRAVENOUS AS NEEDED
Status: DISCONTINUED | OUTPATIENT
Start: 2018-03-30 | End: 2018-03-30 | Stop reason: SURG

## 2018-03-30 RX ORDER — OXYBUTYNIN CHLORIDE 5 MG/1
5 TABLET ORAL 2 TIMES DAILY PRN
Status: DISCONTINUED | OUTPATIENT
Start: 2018-03-30 | End: 2018-03-31 | Stop reason: HOSPADM

## 2018-03-30 RX ORDER — FENTANYL CITRATE/PF 50 MCG/ML
50 SYRINGE (ML) INJECTION
Status: DISCONTINUED | OUTPATIENT
Start: 2018-03-30 | End: 2018-03-30 | Stop reason: HOSPADM

## 2018-03-30 RX ORDER — ALBUTEROL SULFATE 90 UG/1
2 AEROSOL, METERED RESPIRATORY (INHALATION) EVERY 4 HOURS PRN
Status: DISCONTINUED | OUTPATIENT
Start: 2018-03-30 | End: 2018-03-31 | Stop reason: HOSPADM

## 2018-03-30 RX ORDER — ASPIRIN 325 MG
650 TABLET, DELAYED RELEASE (ENTERIC COATED) ORAL EVERY 6 HOURS PRN
Status: DISCONTINUED | OUTPATIENT
Start: 2018-03-30 | End: 2018-03-31 | Stop reason: HOSPADM

## 2018-03-30 RX ORDER — DEXTROSE, SODIUM CHLORIDE, AND POTASSIUM CHLORIDE 5; .45; .15 G/100ML; G/100ML; G/100ML
125 INJECTION INTRAVENOUS CONTINUOUS
Status: DISCONTINUED | OUTPATIENT
Start: 2018-03-30 | End: 2018-03-31 | Stop reason: HOSPADM

## 2018-03-30 RX ORDER — MORPHINE SULFATE 4 MG/ML
4 INJECTION, SOLUTION INTRAMUSCULAR; INTRAVENOUS EVERY 4 HOURS PRN
Status: DISCONTINUED | OUTPATIENT
Start: 2018-03-30 | End: 2018-03-31 | Stop reason: HOSPADM

## 2018-03-30 RX ORDER — ONDANSETRON 2 MG/ML
INJECTION INTRAMUSCULAR; INTRAVENOUS AS NEEDED
Status: DISCONTINUED | OUTPATIENT
Start: 2018-03-30 | End: 2018-03-30 | Stop reason: SURG

## 2018-03-30 RX ORDER — FENTANYL CITRATE 50 UG/ML
INJECTION, SOLUTION INTRAMUSCULAR; INTRAVENOUS AS NEEDED
Status: DISCONTINUED | OUTPATIENT
Start: 2018-03-30 | End: 2018-03-30 | Stop reason: SURG

## 2018-03-30 RX ADMIN — GLYCOPYRROLATE 0.5 MG: 0.2 INJECTION, SOLUTION INTRAMUSCULAR; INTRAVENOUS at 11:40

## 2018-03-30 RX ADMIN — DOXAZOSIN 8 MG: 2 TABLET ORAL at 21:06

## 2018-03-30 RX ADMIN — SODIUM CHLORIDE 125 ML/HR: 0.9 INJECTION, SOLUTION INTRAVENOUS at 08:17

## 2018-03-30 RX ADMIN — DEXTROSE, SODIUM CHLORIDE, AND POTASSIUM CHLORIDE 125 ML/HR: 5; .45; .15 INJECTION INTRAVENOUS at 22:53

## 2018-03-30 RX ADMIN — ONDANSETRON HYDROCHLORIDE 4 MG: 2 INJECTION, SOLUTION INTRAVENOUS at 11:27

## 2018-03-30 RX ADMIN — ONDANSETRON 4 MG: 2 INJECTION INTRAMUSCULAR; INTRAVENOUS at 15:28

## 2018-03-30 RX ADMIN — FUROSEMIDE 10 MG: 10 INJECTION, SOLUTION INTRAMUSCULAR; INTRAVENOUS at 11:25

## 2018-03-30 RX ADMIN — SODIUM CHLORIDE: 0.9 INJECTION, SOLUTION INTRAVENOUS at 10:45

## 2018-03-30 RX ADMIN — DEXTROSE, SODIUM CHLORIDE, AND POTASSIUM CHLORIDE 125 ML/HR: 5; .45; .15 INJECTION INTRAVENOUS at 15:28

## 2018-03-30 RX ADMIN — OXYCODONE HYDROCHLORIDE AND ACETAMINOPHEN 1 TABLET: 5; 325 TABLET ORAL at 14:00

## 2018-03-30 RX ADMIN — GENTAMICIN SULFATE 110 MG: 40 INJECTION, SOLUTION INTRAMUSCULAR; INTRAVENOUS at 10:35

## 2018-03-30 RX ADMIN — SODIUM CHLORIDE: 0.9 INJECTION, SOLUTION INTRAVENOUS at 11:52

## 2018-03-30 RX ADMIN — FAMOTIDINE 20 MG: 20 TABLET, FILM COATED ORAL at 14:00

## 2018-03-30 RX ADMIN — Medication 1 CAPSULE: at 14:00

## 2018-03-30 RX ADMIN — SULFAMETHOXAZOLE AND TRIMETHOPRIM 1 TABLET: 800; 160 TABLET ORAL at 14:00

## 2018-03-30 RX ADMIN — CEFAZOLIN SODIUM 2000 MG: 1 SOLUTION INTRAVENOUS at 10:35

## 2018-03-30 RX ADMIN — VITAMIN B12 0.1 MG ORAL TABLET 100 MCG: 0.1 TABLET ORAL at 13:59

## 2018-03-30 RX ADMIN — NEOSTIGMINE METHYLSULFATE 3 MG: 1 INJECTION, SOLUTION INTRAMUSCULAR; INTRAVENOUS; SUBCUTANEOUS at 11:40

## 2018-03-30 RX ADMIN — ROCURONIUM BROMIDE 40 MG: 10 INJECTION INTRAVENOUS at 10:36

## 2018-03-30 RX ADMIN — DOCUSATE SODIUM 100 MG: 100 CAPSULE, LIQUID FILLED ORAL at 17:36

## 2018-03-30 RX ADMIN — FENTANYL CITRATE 100 MCG: 50 INJECTION, SOLUTION INTRAMUSCULAR; INTRAVENOUS at 10:34

## 2018-03-30 RX ADMIN — MIDAZOLAM HYDROCHLORIDE 2 MG: 1 INJECTION, SOLUTION INTRAMUSCULAR; INTRAVENOUS at 10:34

## 2018-03-30 RX ADMIN — DOCUSATE SODIUM 100 MG: 100 CAPSULE, LIQUID FILLED ORAL at 14:00

## 2018-03-30 RX ADMIN — LIDOCAINE HYDROCHLORIDE 100 MG: 20 INJECTION, SOLUTION INTRAVENOUS at 10:34

## 2018-03-30 RX ADMIN — PROPOFOL 200 MG: 10 INJECTION, EMULSION INTRAVENOUS at 10:36

## 2018-03-30 NOTE — INTERIM OP NOTE
TRANSURETHRAL RESECTION OF PROSTATE (TURP)  Postoperative Note  PATIENT NAME: Puja Lamr  : 1945  MRN: [id number]  [facility] OR ROOM 05    Surgery Date: 3/30/2018    Preop Diagnosis:  BPH with urinary obstruction [N40 1, N13 8]    Post-Op Diagnosis Codes:     * BPH with urinary obstruction [N40 1, N13 8]    Procedure(s) (LRB):  TRANSURETHRAL RESECTION OF PROSTATE (TURP) (N/A)    Surgeon(s) and Role:     * Jabari Jean MD - Primary    Specimens:  ID Type Source Tests Collected by Time Destination   1 : Prostate Tissue Tissue Prostate TISSUE EXAM Jabari Jean MD 3/30/2018 1106        Estimated Blood Loss:   Minimal    Anesthesia Type:   Spinal     Findings:    TRILOBAR HYPERPLASIA OF THE PROSTATE WITH VISUAL OCCLUSION OF THE BLADDER OUTLET  Complications:   None    SIGNATURE: Jabari Jean MD   DATE: 2018   TIME: 11:52 AM

## 2018-03-30 NOTE — DISCHARGE SUMMARY
Discharge Summary - Yu Shelton 71 y o  male MRN: [id number]    Unit/Bed#: OR POOL Encounter: [id number]    Admission Date: 3/30/2018     Discharge Date:  03/31/2018  Admitting Diagnosis: BPH with urinary obstruction [N40 1, N13 8]    Admitting Provider: Edgar Cruz MD    Discharging Provider: Edgar Cruz MD    Primary Care Physician at Discharge: Derrick Ríos [phone]     HPI:This is a 71 y o  old male presented with BPH with urinary obstruction [N40 1, N13 8], patient experienced urinary retention and continued to have significant obstructive voiding symptoms despite alpha blockade  The patient presented to the office and we discussed TURP with the patient being made aware of the possibility of retention incontinence perforation stricture bleeding or infection  He accepted these side effects and others and requested TURP  The patient was admitted for that  No Known Allergies    Consults   none       Order Current Status    Tissue Exam Collected (03/30/18 1106)          Procedures Performed: No orders of the defined types were placed in this encounter  Hospital Course:  Patient tolerated the hospitalization well without complication    Significant Findings, Care, Treatment and Services Provided:  TURP    Complications:  None    Discharge Diagnosis:  BPH with bladder outlet obstruction    Condition at Discharge: good     Discharge instructions/Information to patient and family:   See after visit summary for information provided to patient and family  Provisions for Follow-Up Care:  See after visit summary for information related to follow-up care and any pertinent home health orders  Disposition: Home    Planned Readmission: No    Discharge Statement   I spent 45 minutes discharging the patient  This time was spent on the day of discharge  I had direct contact with the patient on the day of discharge   Additional documentation is required if more than 30 minutes were spent on discharge  Discharge Medications:  See after visit summary for reconciled discharge medications provided to patient and family          ?  ?

## 2018-03-30 NOTE — DISCHARGE INSTRUCTIONS
Benign Prostatic Hypertrophy   WHAT YOU NEED TO KNOW:   Benign prostatic hypertrophy (BPH) is a condition that causes your prostate gland to grow larger than normal  The prostate gland is the male sex gland that produces a fluid that is part of semen  It is about the size of a walnut and it is located under the bladder  As the prostate grows, it can squeeze the urethra  This can block urine flow and cause urinary problems  DISCHARGE INSTRUCTIONS:   Medicines:   · Alpha blockers: This medicine relaxes the muscles in your prostate and bladder  It may help you urinate more easily  · 5 alpha reductase inhibitors: These medicines block the production of a hormone that causes the prostate to get larger  It may help to slow the growth of the prostate or shrink the prostate  · Take your medicine as directed  Contact your healthcare provider if you think your medicine is not helping or if you have side effects  Tell him of her if you are allergic to any medicine  Keep a list of the medicines, vitamins, and herbs you take  Include the amounts, and when and why you take them  Bring the list or the pill bottles to follow-up visits  Carry your medicine list with you in case of an emergency  Follow up with your healthcare provider as directed:  Write down your questions so you remember to ask them during your visits  Manage BPH:   · Do not let your bladder get too full before you empty it  Urinate when you feel the urge  · Limit alcohol  Do not drink large amounts of any liquid at one time  · Decrease the amount of salt you eat  Examples of salty foods are chips, cured meats, and canned soups  Do not use table salt  · Healthcare providers may tell you not to eat spicy foods such as chilli peppers  This may help you find out if spicy food makes your BPH symptoms worse  · You may have sex if you feel well  Contact your healthcare provider if:   · There is a large amount of blood in your urine  · Your signs and symptoms get worse  · You have a fever  · You have questions or concerns about your condition or care  Return to the emergency department if:   · You are unable to urinate  · Your bladder feels very full and painful  © 2017 [employer]  Information is for End User's use only and may not be sold, redistributed or otherwise used for commercial purposes  All illustrations and images included in CareNotes® are the copyrighted property of [employer]  or [employer]ny Child[employer]  The above information is an  only  It is not intended as medical advice for individual conditions or treatments  Talk to your doctor, nurse or pharmacist before following any medical regimen to see if it is safe and effective for you  Benign Prostatic Hypertrophy   WHAT YOU NEED TO KNOW:   Benign prostatic hypertrophy (BPH) is a condition that causes your prostate gland to grow larger than normal  The prostate gland is the male sex gland that produces a fluid that is part of semen  It is about the size of a walnut and it is located under the bladder  As the prostate grows, it can squeeze the urethra  This can block urine flow and cause urinary problems  DISCHARGE INSTRUCTIONS:   Medicines:   · Alpha blockers: This medicine relaxes the muscles in your prostate and bladder  It may help you urinate more easily  · 5 alpha reductase inhibitors: These medicines block the production of a hormone that causes the prostate to get larger  It may help to slow the growth of the prostate or shrink the prostate  · Take your medicine as directed  Contact your healthcare provider if you think your medicine is not helping or if you have side effects  Tell him of her if you are allergic to any medicine  Keep a list of the medicines, vitamins, and herbs you take  Include the amounts, and when and why you take them  Bring the list or the pill bottles to follow-up visits  Carry your medicine list with you in case of an emergency  Follow up with your healthcare provider as directed:  Write down your questions so you remember to ask them during your visits  Manage BPH:   · Do not let your bladder get too full before you empty it  Urinate when you feel the urge  · Limit alcohol  Do not drink large amounts of any liquid at one time  · Decrease the amount of salt you eat  Examples of salty foods are chips, cured meats, and canned soups  Do not use table salt  · Healthcare providers may tell you not to eat spicy foods such as chilli peppers  This may help you find out if spicy food makes your BPH symptoms worse  · You may have sex if you feel well  Contact your healthcare provider if:   · There is a large amount of blood in your urine  · Your signs and symptoms get worse  · You have a fever  · You have questions or concerns about your condition or care  Return to the emergency department if:   · You are unable to urinate  · Your bladder feels very full and painful  © 2017 [facility] [facility] Information is for End User's use only and may not be sold, redistributed or otherwise used for commercial purposes  All illustrations and images included in CareNotes® are the copyrighted property of [employer]  or [employer]  The above information is an  only  It is not intended as medical advice for individual conditions or treatments  Talk to your doctor, nurse or pharmacist before following any medical regimen to see if it is safe and effective for you  Hubbard Catheter Placement and Care   WHAT YOU NEED TO KNOW:   A Hubbard catheter is a sterile tube that is inserted into your bladder to drain urine  It is also called an indwelling urinary catheter  The tip of the catheter has a small balloon filled with solution that holds the catheter inside your bladder              DISCHARGE INSTRUCTIONS:   Return to the emergency department if:   · Your catheter comes out  · You suddenly have material that looks like sand in the tubing or drainage bag  · No urine is draining into the bag and you have checked the system  · You have pain in your hip, back, pelvis, or lower abdomen  · You are confused or cannot think clearly  Contact your healthcare provider if:   · You have a fever  · You have bladder spasms for more than 1 day after the catheter is placed  · You see blood in the tubing or drainage bag  · You have a rash or itching where the catheter tube is secured to your skin  · Urine leaks from or around the catheter, tubing, or drainage bag  · The closed drainage system has accidently come open or apart  · You see a layer of crystals inside the tubing  · You have questions or concerns about your condition or care  Care for your Hubbard catheter:   · Clean your genital area 2 times every day  Clean your catheter and the area around where it was inserted  Use soap and water  Clean your anal opening and catheter area after every bowel movement  · Secure the catheter tube  so you do not pull or move the catheter  This helps prevent pain and bladder spasms  Healthcare providers will show you how to use medical tape or a strap to secure the catheter tube to your body  · Keep a closed drainage system  Your Hubbard catheter should always be attached to the drainage bag to form a closed system  Do not disconnect any part of the closed system unless you need to change the bag  Care for your drainage bag:   · Ask if a leg bag is right for you  A leg bag can be worn under your clothes  Ask your healthcare provider for more information about a leg bag  · Keep the drainage bag below the level of your waist   This helps stop urine from moving back up the tubing and into your bladder  Do not loop or kink the tubing  This can cause urine to back up and collect in your bladder   Do not let the drainage bag touch or lie on the floor  · Empty the drainage bag when needed  The weight of a full drainage bag can be painful  Empty the drainage bag every 3 to 6 hours or when it is ? full  · Clean and change the drainage bag as directed  Ask your healthcare provider how often you should change the drainage bag and what cleaning solution to use  Wear disposable gloves when you change the bag  Do not allow the end of the catheter or tubing to touch anything  Clean the ends with an alcohol pad before you reconnect them  What to do if problems develop:   · No urine is draining into the bag:      ¨ Check for kinks in the tubing and straighten them out  ¨ Check the tape or strap used to secure the catheter tube to your skin  Make sure it is not blocking the tube  ¨ Make sure you are not sitting or lying on the tubing  ¨ Make sure the urine bag is hanging below the level of your waist     · Urine leaks from or around the catheter, tubing, or drainage bag:  Check if the closed drainage system has accidently come open or apart  Clean the catheter and tubing ends with a new alcohol pad and reconnect them  Prevent an infection:   · Wash your hands often  Wash before and after you touch your catheter, tubing, or drainage bag  Use soap and water  Wear clean disposable gloves when you care for your catheter or disconnect the drainage bag  Wash your hands before you prepare or eat food  · Drink liquids as directed  Ask your healthcare provider how much liquid to drink each day and which liquids are best for you  Liquids will help flush your kidneys and bladder to help prevent infection  Follow up with your healthcare provider as directed:  Write down your questions so you remember to ask them during your visits  © 2017 [employer]  Information is for End User's use only and may not be sold, redistributed or otherwise used for commercial purposes   All illustrations and images included in CareNotes® are the copyrighted property of [employer]  or [employer]  The above information is an  only  It is not intended as medical advice for individual conditions or treatments  Talk to your doctor, nurse or pharmacist before following any medical regimen to see if it is safe and effective for you  Transurethral Prostatectomy   WHAT YOU NEED TO KNOW:   A transurethral prostatectomy is surgery that is done to remove part or all of your prostate gland  This surgery is also called transurethral resection of the prostate (TURP)  DISCHARGE INSTRUCTIONS:   Medicines:   · Pain medicine: You may be given a prescription medicine to decrease pain  Do not wait until the pain is severe before you take this medicine  · Antibiotics: This medicine is given to fight or prevent an infection caused by bacteria  Always take your antibiotics exactly as ordered by your healthcare provider  Do not stop taking your medicine unless directed by your healthcare provider  Never save antibiotics or take leftover antibiotics that were given to you for another illness  · Take your medicine as directed  Contact your healthcare provider if you think your medicine is not helping or if you have side effects  Tell him or her if you are allergic to any medicine  Keep a list of the medicines, vitamins, and herbs you take  Include the amounts, and when and why you take them  Bring the list or the pill bottles to follow-up visits  Carry your medicine list with you in case of an emergency  Follow up with your healthcare provider or urologist as directed: You may need to return to make sure you do not have an infection, or to have your Hubbard catheter removed  Write down your questions so you remember to ask them during your visits  Hubbard catheter care: A Hubbard catheter is a tube put into your bladder to drain your urine into a bag   Keep the bag of urine well below your waist  Lifting the urine bag higher will make the urine flow back into your bladder, which can cause an infection  Do not pull on the catheter  This may cause pain and bleeding, and the catheter may come out  Do not let the catheter tubing kink, because this will block the flow of urine  Ask for more information about how to care for yourself when you have a Hubbard catheter in place  Bladder control:  After surgery, you may leak urine and have trouble controlling when you urinate  Ask for more information about the following ways to help decrease urine leakage:  · Avoid caffeine:  Caffeine can cause problems with bladder control and increase your need to urinate  · Do pelvic floor muscle exercises:  Pelvic floor muscle exercises may help improve your bladder control, if you leak urine  These exercises are done by tightening and relaxing your pelvic muscles  Ask how to do pelvic floor muscle exercises, and how often to do them  · Limit your liquids:  Drink smaller amounts of liquid throughout the day  Do not drink before bedtime  Ask if you should decrease the amount of liquid you drink each day  This may help you control your bladder  · Wear a pad or adult diapers: These may help to absorb leaking urine and decrease the odor  Activity guidelines:  Ask when it is okay for you to return to work and activities, or to have sex  Contact your healthcare provider or urologist if:   · You have a fever  · You have new or more blood in your urine  · You have trouble starting to urinate, or have a weak stream of urine when you urinate  · You feel like you have a full bladder, even after you urinate  You may also leak urine  · You often wake up during the night to urinate  You may also feel the need to urinate right away  · You feel pain and burning when you urinate  · You feel pain or pressure in your lower abdomen  · Your urine looks cloudy, and smells bad  · You have trouble getting an erection or ejaculating  · You have questions or concerns about your condition or care  Seek care immediately or call 911 if:   · You urinate little or not at all  · You have severe abdominal or back pain  · You are dizzy or confused  · You have abdominal pain, nausea, and vomiting  · Your heartbeat is slower than usual   © 2017 [address] [employer] Information is for End User's use only and may not be sold, redistributed or otherwise used for commercial purposes  All illustrations and images included in CareNotes® are the copyrighted property of [employer]  or [employer] Child[employer]  The above information is an  only  It is not intended as medical advice for individual conditions or treatments  Talk to your doctor, nurse or pharmacist before following any medical regimen to see if it is safe and effective for you

## 2018-03-30 NOTE — H&P (VIEW-ONLY)
Assessment/Plan:   1  BPH with obstructive symptoms  The patient has a prominent median lobe as well as bilobar enlargement of the lateral lobes of the prostate with significant bladder outlet obstructive symptoms despite being on maximal doses of alpha blocker  The patient will require TURP in the future  2   Elevated PSA-the patient presents today for transrectal needle biopsy the prostate under ultrasound guidance  I again did reveal his renal ultrasound findings and explained those  He will return in 2 weeks to discuss findings on prostate biopsy  Diagnoses and all orders for this visit:    Elevated PSA, between 10 and less than 20 ng/ml    BPH with obstruction/lower urinary tract symptoms    Other orders  -     ciprofloxacin (CIPRO) 500 mg tablet;           Subjective:     Patient ID: Wilmer Alpers is a 71 y o  male  HPI 78-year-old male with an elevated PSA in excess of 10 as well as a firm left posterior aspect of the prostate on LIN presents for prostate biopsy today  In addition the patient has obstructive voiding symptomatology despite alpha blockade  Trilobar hyperplasia was identified on cystoscopy in the patient will be considered for TURP in the future  Review of Systems   Musculoskeletal: Positive for arthralgias  All other systems reviewed and are negative  Objective:     Physical Exam   Constitutional: He is oriented to person, place, and time  He appears well-developed and well-nourished  No distress  HENT:   Head: Atraumatic  Eyes: EOM are normal    Neck: Neck supple  Cardiovascular: Normal rate and regular rhythm  Pulmonary/Chest: Effort normal  No respiratory distress  Abdominal: Soft  He exhibits no distension  Genitourinary: Penis normal    Neurological: He is alert and oriented to person, place, and time  Psychiatric: He has a normal mood and affect   His behavior is normal  Judgment and thought content normal           Biopsy prostate Date/Time 3/20/2018 5:39 PM     Performed by  Adrianna Stoll     Authorized by Adrianna Stoll       Consent: Verbal consent obtained  Risks and benefits: risks, benefits and alternatives were discussed  Consent given by: patient  Patient understanding: patient states understanding of the procedure being performed  Patient consent: the patient's understanding of the procedure matches consent given  Patient identity confirmed: verbally with patient  Time out: Immediately prior to procedure a "time out" was called to verify the correct patient, procedure, equipment, support staff and site/side marked as required  Local anesthesia used: yes      Anesthesia: local infiltration and nerve block     Anesthesia   Local anesthesia used: yes  Local Anesthetic: lidocaine 1% without epinephrine     Sedation   Patient sedated: no      Specimen: yes    Culture: no   Procedure Details   Procedure Notes: The patient was placed on the exam table in the right flank up position with knees to chest   The ultrasound probe was lubricated and placed per rectum against the prostate  Prostate size was estimated at 28 g by ultrasound measurement  No hypoechoic regions of the peripheral zone were in countered  Prostate did extend through a median lobe into the urinary bladder at with a mass effect consistent with trilobar hyperplasia  After performing a pelvic plexus block using lidocaine 1% bilaterally at the tips of the seminal vesicles apical lidocaine 1% was also deposited bilaterally  After anesthesia was obtained sextant biopsies with 2 cores per sextant 1 medial and 1 lateral were obtained for a total of 12 core biopsies  There was no visible bleeding the patient tolerated the procedure well  He was discharged from the office in good condition    Patient Transportation: confirmed  Patient tolerance: Patient tolerated the procedure well with no immediate complications

## 2018-03-30 NOTE — OP NOTE
OPERATIVE REPORT  PATIENT NAME: Samantha Silverman    :  1945  MRN: [id number]  Pt Location: [facility] OR ROOM 05    SURGERY DATE: 3/30/2018    Surgeon(s) and Role:     * Cory Darby MD - Primary    Preop Diagnosis:  BPH with urinary obstruction [N40 1, N13 8]    Post-Op Diagnosis Codes:     * BPH with urinary obstruction [N40 1, N13 8]    Procedure(s) (LRB):  TRANSURETHRAL RESECTION OF PROSTATE (TURP) (N/A)    Specimen(s):  ID Type Source Tests Collected by Time Destination   1 : Prostate Tissue Tissue Prostate TISSUE EXAM Cory Darby MD 3/30/2018 1106        Estimated Blood Loss:   Minimal    Drains:       Anesthesia Type:   Spinal    Operative Indications:  BPH with urinary obstruction [N40 1, N13 8]       Operative Findings:  TRILOBAR HYPERPLASIA OF THE PROSTATE WITH VISUAL OCCLUSION TO THE BLADDER OUTLET    Complications:   None    Procedure and Technique:  PATIENT WAS SEEN IN THE HOLDING ROOM AND WE AGAIN DISCUSSED TURP  THE PATIENT AGREED UNDERSTANDING RISKS OF BLEEDING INFECTION INCONTINENCE URINARY RETENTION NEED FOR SECONDARY PROCEDURES  THE PATIENT AGREED TO THE PROCEDURE  The patient was taken to the operating room identified by the surgeon and prepped and draped usual sterile fashion in the dorsal lithotomy position  Using a 32 [place] continuous flow resectoscope optic obturator and sheath urethra was intubated into the urinary bladder  The urethra was within normal limits without stricture lesion  The prostatic urethra revealed bilobar enlargement of the lateral lobes of the prostate with visual occlusion of the bladder outlet  There was also a large median lobe that was intravesical and was quite difficult to resect  This ball valve into the bladder outlet  The bladder was free of any intrinsic lesions was moderately trabeculated and both ureteral orifices were normal position and configuration bilaterally with clear efflux bilaterally    The prostate was resected using a cutting current at 100 from the bladder neck proximally to the verumontanum distally depth wise to the surgical capsular fibers of the prostate  After the [facility] evacuator was able to evacuate all prostatic chips from the urethra and the bladder the coagulation current was used to coagulate any visualized bleeding  At the conclusion of the procedure the resected prostate was nonobstructive no additional chips were noted within the bladder urethra there was no active bleeding there was no perforation of the prostatic capsule or the bladder and there was no injury to either ureteral orifice  At the end of the procedure all efflux from the bladder was totally clear  After all equipment was removed from the urinary bladder and urethra a 24 Irish Hubbard catheter was placed per urethra with 40 cc of saline placed in the Hubbard balloon which was held on traction for 2 minutes  Traction was released and efflux was totally clear  The patient was then transferred to the recovery room in stable condition  There were no complications     I was present for the entire procedure and A qualified resident physician was not available    Patient Disposition:  PACU     SIGNATURE: Bruno Epley, MD  DATE: March 30, 2018  TIME: 11:53 AM

## 2018-03-31 VITALS
SYSTOLIC BLOOD PRESSURE: 125 MMHG | BODY MASS INDEX: 34.4 KG/M2 | HEART RATE: 99 BPM | HEIGHT: 70 IN | RESPIRATION RATE: 18 BRPM | TEMPERATURE: 98.1 F | WEIGHT: 240.3 LBS | OXYGEN SATURATION: 96 % | DIASTOLIC BLOOD PRESSURE: 71 MMHG

## 2018-03-31 PROCEDURE — 99024 POSTOP FOLLOW-UP VISIT: CPT | Performed by: UROLOGY

## 2018-03-31 RX ADMIN — SULFAMETHOXAZOLE AND TRIMETHOPRIM 1 TABLET: 800; 160 TABLET ORAL at 08:48

## 2018-03-31 RX ADMIN — FAMOTIDINE 20 MG: 20 TABLET, FILM COATED ORAL at 08:48

## 2018-03-31 RX ADMIN — DEXTROSE, SODIUM CHLORIDE, AND POTASSIUM CHLORIDE 125 ML/HR: 5; .45; .15 INJECTION INTRAVENOUS at 06:27

## 2018-03-31 RX ADMIN — VITAMIN B12 0.1 MG ORAL TABLET 100 MCG: 0.1 TABLET ORAL at 08:48

## 2018-03-31 RX ADMIN — DOCUSATE SODIUM 100 MG: 100 CAPSULE, LIQUID FILLED ORAL at 08:48

## 2018-03-31 RX ADMIN — Medication 1 CAPSULE: at 08:48

## 2018-03-31 NOTE — PROGRESS NOTES
Progress Note - Urology Progress  Nathalie Zhong 71 y o  male MRN: [id number]  Unit/Bed#: E5 -01 Encounter: [id number]    Assessment:  Postop day 1 status post TURP-stable    Plan:  Discontinue Y flush  Hubbard catheter to leg bag  Stat discharge home today with Hubbard to leg bag  Patient as prearranged office visit in 4 days with Dr Hermilo Mulligan for it's discontinuation  Subjective/Objective   Chief Complaint:  No complaints    Subjective:  Feels well  Wants to go home    Objective:  Uneventful night    Blood pressure 125/71, pulse 99, temperature 98 1 °F (36 7 °C), temperature source Temporal, resp  rate 18, height 5' 10" (1 778 m), weight 109 kg (240 lb 4 8 oz), SpO2 96 %  ,Body mass index is 34 48 kg/m²        Intake/Output Summary (Last 24 hours) at 03/31/18 0938  Last data filed at 03/31/18 0974   Gross per 24 hour   Intake          4872 91 ml   Output             4400 ml   Net           472 91 ml       Invasive Devices     Peripheral Intravenous Line            Peripheral IV 03/30/18 Left Hand 1 day          Drain            Continuous Bladder Irrigation  less than 1 day                Physical Exam:   Lungs clear  Heart regular rate rhythm  Abdomen soft nontender  Hubbard catheter in place with clear yellow urine returned  Extremities normal  Neuro intact      Lab, Imaging and other studies:CBC: No results found for: WBC, HGB, HCT, MCV, PLT, ADJUSTEDWBC, MCH, MCHC, RDW, MPV, NRBC, CMP: No results found for: NA, K, CL, CO2, ANIONGAP, BUN, CREATININE, GLUCOSE, CALCIUM, AST, ALT, ALKPHOS, PROT, ALBUMIN, BILITOT, EGFR  VTE Pharmacologic Prophylaxis: Sequential compression device (Venodyne)   VTE Mechanical Prophylaxis: sequential compression device

## 2018-04-02 ENCOUNTER — TELEPHONE (OUTPATIENT)
Dept: UROLOGY | Facility: AMBULATORY SURGERY CENTER | Age: 73
End: 2018-04-02

## 2018-04-02 ENCOUNTER — CLINICAL SUPPORT (OUTPATIENT)
Dept: UROLOGY | Facility: MEDICAL CENTER | Age: 73
End: 2018-04-02

## 2018-04-02 VITALS
SYSTOLIC BLOOD PRESSURE: 126 MMHG | WEIGHT: 235 LBS | DIASTOLIC BLOOD PRESSURE: 78 MMHG | BODY MASS INDEX: 33.64 KG/M2 | HEIGHT: 70 IN

## 2018-04-02 DIAGNOSIS — N40.1 BPH WITH OBSTRUCTION/LOWER URINARY TRACT SYMPTOMS: Primary | ICD-10-CM

## 2018-04-02 DIAGNOSIS — N13.8 BPH WITH OBSTRUCTION/LOWER URINARY TRACT SYMPTOMS: Primary | ICD-10-CM

## 2018-04-02 PROCEDURE — 99024 POSTOP FOLLOW-UP VISIT: CPT

## 2018-04-02 NOTE — TELEPHONE ENCOUNTER
Spoke to Pt, urine clear, draining in bag but leaking at meatus q 15 min  sched for removal tomorrow  Lives 1/2 hour away from office  Will check with Dr Danielle Anthony if we can remove today

## 2018-04-02 NOTE — TELEPHONE ENCOUNTER
Patient needs a call from the nurse, he said his catheter is leaking  Please call him back ASAP   Thank you

## 2018-04-02 NOTE — PROGRESS NOTES
I have reviewed the notes, assessments, and/or procedures performed by the LPN, I concur with her/his documentation of Nathalie Zhong

## 2018-04-02 NOTE — PROGRESS NOTES
Procedures  Patient returns for Hubbard removal   Urine in bag is clear yellow  Hubbard removed without difficulty, patient tolerated procedure well  Denies fever or urinary symptoms  Patient instructed to increase fluids and limit caffeine intake  To return to office if unable to void within 4-6 hours, or has increased discomfort

## 2018-04-10 ENCOUNTER — TELEPHONE (OUTPATIENT)
Dept: UROLOGY | Facility: AMBULATORY SURGERY CENTER | Age: 73
End: 2018-04-10

## 2018-04-10 NOTE — TELEPHONE ENCOUNTER
Can give him a letter to go back to work on 04/16/2018 with light duty for 2 weeks then back to full duty

## 2018-04-26 ENCOUNTER — OFFICE VISIT (OUTPATIENT)
Dept: UROLOGY | Facility: MEDICAL CENTER | Age: 73
End: 2018-04-26
Payer: MEDICARE

## 2018-04-26 DIAGNOSIS — N13.8 BPH WITH OBSTRUCTION/LOWER URINARY TRACT SYMPTOMS: Primary | ICD-10-CM

## 2018-04-26 DIAGNOSIS — N40.1 BPH WITH OBSTRUCTION/LOWER URINARY TRACT SYMPTOMS: Primary | ICD-10-CM

## 2018-04-26 LAB
POST-VOID RESIDUAL VOLUME, ML POC: 24 ML
SL AMB  POCT GLUCOSE, UA: ABNORMAL
SL AMB LEUKOCYTE ESTERASE,UA: ABNORMAL
SL AMB POCT BILIRUBIN,UA: ABNORMAL
SL AMB POCT BLOOD,UA: ABNORMAL
SL AMB POCT CLARITY,UA: CLEAR
SL AMB POCT COLOR,UA: YELLOW
SL AMB POCT KETONES,UA: ABNORMAL
SL AMB POCT NITRITE,UA: ABNORMAL
SL AMB POCT PH,UA: 6
SL AMB POCT SPECIFIC GRAVITY,UA: 1.02
SL AMB POCT URINE PROTEIN: ABNORMAL
SL AMB POCT UROBILINOGEN: 0.2

## 2018-04-26 PROCEDURE — 51798 US URINE CAPACITY MEASURE: CPT | Performed by: UROLOGY

## 2018-04-26 PROCEDURE — 99024 POSTOP FOLLOW-UP VISIT: CPT | Performed by: UROLOGY

## 2018-04-26 PROCEDURE — 81003 URINALYSIS AUTO W/O SCOPE: CPT | Performed by: UROLOGY

## 2018-04-26 RX ORDER — AMOXICILLIN 500 MG/1
CAPSULE ORAL
COMMUNITY
Start: 2018-04-14 | End: 2018-04-26

## 2018-04-26 NOTE — LETTER
April 26, 2018     Elyssa Xie MD  [address] [address]    Patient: Jose Manuel Collier   YOB: 1945   Date of Visit: 4/26/2018       Dear Dr Tanya Finch:    Thank you for referring Sugar Linn to me for evaluation  Below are my notes for this consultation  If you have questions, please do not hesitate to call me  I look forward to following your patient along with you  Sincerely,        Darline Zhu MD        CC: No Recipients  Darline Zhu MD  4/26/2018 10:54 AM  Sign at close encounter  Assessment/Plan:   1  BPH with bladder outlet obstructive symptoms  The patient is quite pleased with the results of TURP noting that he voids with an excellent urinary stream and feelings of complete emptying  His bladder scan for PVR confirmed this  I reviewed his tissue report and there is no evidence of malignancy of the prostate despite the preoperative PSA of over 10  The patient will return in 3 months at which time repeat PSA with chemistry profile will take place  If the patient remains stable with regard to his urination at that point he will return thereafter on an as-needed basis  Diagnoses and all orders for this visit:    BPH with obstruction/lower urinary tract symptoms  -     POCT Measure PVR  -     POCT urine dip auto non-scope  -     PSA, total and free; Future  -     Comprehensive metabolic panel; Future    Other orders  -     Discontinue: amoxicillin (AMOXIL) 500 mg capsule;           Subjective:     Patient ID: Jose Manuel Collier is a 71 y o  male  HPI a 64-year-old male approximately 1 month status post TURP  He notes an excellent urinary stream since surgery full continence no gross hematuria with no evidence of incomplete bladder emptying or urgency frequency or nocturia  The patient is very happy with the results of surgery  The patient returns for follow-up    He will be advised to discontinue doxazosin  Review of Systems All other systems reviewed and are negative  Objective:     Physical Exam   Constitutional: He is oriented to person, place, and time  He appears well-developed and well-nourished  No distress  HENT:   Head: Normocephalic and atraumatic  Eyes: Conjunctivae and EOM are normal  Left eye exhibits no discharge  Neck: Normal range of motion  Neck supple  No thyromegaly present  Pulmonary/Chest: Effort normal  No respiratory distress  Abdominal: Soft  He exhibits no distension  Neurological: He is alert and oriented to person, place, and time  Psychiatric: He has a normal mood and affect   His behavior is normal  Judgment and thought content normal

## 2018-04-26 NOTE — PROGRESS NOTES
Assessment/Plan:   1  BPH with bladder outlet obstructive symptoms  The patient is quite pleased with the results of TURP noting that he voids with an excellent urinary stream and feelings of complete emptying  His bladder scan for PVR confirmed this  I reviewed his tissue report and there is no evidence of malignancy of the prostate despite the preoperative PSA of over 10  The patient will return in 3 months at which time repeat PSA with chemistry profile will take place  If the patient remains stable with regard to his urination at that point he will return thereafter on an as-needed basis  Diagnoses and all orders for this visit:    BPH with obstruction/lower urinary tract symptoms  -     POCT Measure PVR  -     POCT urine dip auto non-scope  -     PSA, total and free; Future  -     Comprehensive metabolic panel; Future    Other orders  -     Discontinue: amoxicillin (AMOXIL) 500 mg capsule;           Subjective:     Patient ID: Anitha Jurado is a 71 y o  male  HPI a 61-year-old male approximately 1 month status post TURP  He notes an excellent urinary stream since surgery full continence no gross hematuria with no evidence of incomplete bladder emptying or urgency frequency or nocturia  The patient is very happy with the results of surgery  The patient returns for follow-up  He will be advised to discontinue doxazosin  Review of Systems   All other systems reviewed and are negative  Objective:     Physical Exam   Constitutional: He is oriented to person, place, and time  He appears well-developed and well-nourished  No distress  HENT:   Head: Normocephalic and atraumatic  Eyes: Conjunctivae and EOM are normal  Left eye exhibits no discharge  Neck: Normal range of motion  Neck supple  No thyromegaly present  Pulmonary/Chest: Effort normal  No respiratory distress  Abdominal: Soft  He exhibits no distension  Neurological: He is alert and oriented to person, place, and time  Psychiatric: He has a normal mood and affect   His behavior is normal  Judgment and thought content normal

## 2018-04-26 NOTE — PROGRESS NOTES
IPSS Questionnaire (AUA-7): Over the past month    1)  How often have you had a sensation of not emptying your bladder completely after you finish urinating? 5 - Almost always   2)  How often have you had to urinate again less than two hours after you finished urinating? 5 - Almost always   3)  How often have you found you stopped and started again several times when you urinated? 5 - Almost always   4) How difficult have you found it to postpone urination? 5 - Almost always   5) How often have you had a weak urinary stream?  0 - Not at all   6) How often have you had to push or strain to begin urination? 0 - Not at all   7) How many times did you most typically get up to urinate from the time you went to bed until the time you got up in the morning?   2 - 2 times   Total Score:  22

## 2018-08-02 ENCOUNTER — OFFICE VISIT (OUTPATIENT)
Dept: UROLOGY | Facility: MEDICAL CENTER | Age: 73
End: 2018-08-02
Payer: MEDICARE

## 2018-08-02 VITALS
SYSTOLIC BLOOD PRESSURE: 130 MMHG | DIASTOLIC BLOOD PRESSURE: 80 MMHG | BODY MASS INDEX: 33.64 KG/M2 | WEIGHT: 235 LBS | HEIGHT: 70 IN

## 2018-08-02 DIAGNOSIS — N13.8 BPH WITH URINARY OBSTRUCTION: ICD-10-CM

## 2018-08-02 DIAGNOSIS — R97.20 ELEVATED PSA, BETWEEN 10 AND LESS THAN 20 NG/ML: Primary | ICD-10-CM

## 2018-08-02 DIAGNOSIS — N40.1 BPH WITH URINARY OBSTRUCTION: ICD-10-CM

## 2018-08-02 LAB

## 2018-08-02 PROCEDURE — 51798 US URINE CAPACITY MEASURE: CPT | Performed by: UROLOGY

## 2018-08-02 PROCEDURE — 99214 OFFICE O/P EST MOD 30 MIN: CPT | Performed by: UROLOGY

## 2018-08-02 PROCEDURE — 81003 URINALYSIS AUTO W/O SCOPE: CPT | Performed by: UROLOGY

## 2018-08-02 RX ORDER — MELOXICAM 15 MG/1
TABLET ORAL
COMMUNITY
Start: 2018-07-27 | End: 2018-09-07 | Stop reason: HOSPADM

## 2018-08-02 NOTE — PROGRESS NOTES
IPSS Questionnaire (AUA-7): Over the past month    1)  How often have you had a sensation of not emptying your bladder completely after you finish urinating? 0 - Not at all   2)  How often have you had to urinate again less than two hours after you finished urinating? 0 - Not at all   3)  How often have you found you stopped and started again several times when you urinated? 0 - Not at all   4) How difficult have you found it to postpone urination? 0 - Not at all   5) How often have you had a weak urinary stream?  0 - Not at all   6) How often have you had to push or strain to begin urination? 0 - Not at all   7) How many times did you most typically get up to urinate from the time you went to bed until the time you got up in the morning?   1 - 1 time   Total Score:  1

## 2018-08-02 NOTE — LETTER
August 2, 2018     Dearl MD Aris  [address] [address]    Patient: Luke Allen   YOB: 1945   Date of Visit: 8/2/2018       Dear Dr Destinee Hanna:    Thank you for referring Arcola Lundborg to me for evaluation  Below are my notes for this consultation  If you have questions, please do not hesitate to call me  I look forward to following your patient along with you  Sincerely,        Rudolph London MD        CC: No Recipients  Rudolph London MD  8/2/2018 11:51 AM  Sign at close encounter  Assessment/Plan:   One   Elevated PSA-resolved status post TURP    Two BPH with obstruction-resolved patient voiding well  Diagnoses and all orders for this visit:    Elevated PSA, between 10 and less than 20 ng/ml  Comments:  Now normal status post TURP with no evidence of malignancy  Orders:  -     POCT Measure PVR  -     POCT urine dip auto non-scope    BPH with urinary obstruction    Other orders  -     meloxicam (MOBIC) 15 mg tablet;           Subjective:     Patient ID: Luke Allen is a 68 y o  male  Chief complaint:  Previous elevated PSA and history of TURP for obstructive voiding symptoms    History of present illness:  67-year-old male who is status post TURP in March of 2018 noting that his stream is markedly improved he is fully continent has no obstructive irritative symptoms and no nocturia  He is quite pleased with results of treatment  We reviewed his benign tissue diagnosis and his now normalized PSA of 3 3  The patient is pleased with his current voiding pattern and will be seen on an as needed basis  Review of Systems   Constitutional: Negative  HENT: Negative  Eyes: Negative  Respiratory: Negative  Cardiovascular: Negative  Gastrointestinal: Negative  Endocrine: Negative  Genitourinary: Negative  Musculoskeletal: Negative  Neurological: Negative  Psychiatric/Behavioral: Negative  Objective:     Physical Exam   Constitutional: He is oriented to person, place, and time  He appears well-nourished  No distress  HENT:   Head: Atraumatic  Eyes: EOM are normal    Neck: Neck supple  Pulmonary/Chest: Effort normal  No respiratory distress  Abdominal: Soft  Neurological: He is alert and oriented to person, place, and time  Psychiatric: He has a normal mood and affect  His behavior is normal  Judgment and thought content normal    Vitals reviewed

## 2018-08-02 NOTE — PROGRESS NOTES
Assessment/Plan:   One   Elevated PSA-resolved status post TURP    Two BPH with obstruction-resolved patient voiding well  Diagnoses and all orders for this visit:    Elevated PSA, between 10 and less than 20 ng/ml  Comments:  Now normal status post TURP with no evidence of malignancy  Orders:  -     POCT Measure PVR  -     POCT urine dip auto non-scope    BPH with urinary obstruction    Other orders  -     meloxicam (MOBIC) 15 mg tablet;           Subjective:     Patient ID: Kian Claros is a 68 y o  male  Chief complaint:  Previous elevated PSA and history of TURP for obstructive voiding symptoms    History of present illness:  78-year-old male who is status post TURP in March of 2018 noting that his stream is markedly improved he is fully continent has no obstructive irritative symptoms and no nocturia  He is quite pleased with results of treatment  We reviewed his benign tissue diagnosis and his now normalized PSA of 3 3  The patient is pleased with his current voiding pattern and will be seen on an as needed basis  Review of Systems   Constitutional: Negative  HENT: Negative  Eyes: Negative  Respiratory: Negative  Cardiovascular: Negative  Gastrointestinal: Negative  Endocrine: Negative  Genitourinary: Negative  Musculoskeletal: Negative  Neurological: Negative  Psychiatric/Behavioral: Negative  Objective:     Physical Exam   Constitutional: He is oriented to person, place, and time  He appears well-nourished  No distress  HENT:   Head: Atraumatic  Eyes: EOM are normal    Neck: Neck supple  Pulmonary/Chest: Effort normal  No respiratory distress  Abdominal: Soft  Neurological: He is alert and oriented to person, place, and time  Psychiatric: He has a normal mood and affect  His behavior is normal  Judgment and thought content normal    Vitals reviewed

## 2018-08-16 ENCOUNTER — HOSPITAL ENCOUNTER (OUTPATIENT)
Dept: RADIOLOGY | Facility: HOSPITAL | Age: 73
Discharge: HOME | End: 2018-08-16
Attending: ORTHOPAEDIC SURGERY
Payer: MEDICARE

## 2018-08-16 ENCOUNTER — APPOINTMENT (OUTPATIENT)
Dept: LAB | Facility: HOSPITAL | Age: 73
End: 2018-08-16
Attending: ORTHOPAEDIC SURGERY
Payer: MEDICARE

## 2018-08-16 ENCOUNTER — HOSPITAL ENCOUNTER (OUTPATIENT)
Dept: NON INVASIVE DIAGNOSTICS | Facility: HOSPITAL | Age: 73
Discharge: HOME | End: 2018-08-16
Attending: ORTHOPAEDIC SURGERY
Payer: MEDICARE

## 2018-08-16 ENCOUNTER — TRANSCRIBE ORDERS (OUTPATIENT)
Dept: ADMINISTRATIVE | Facility: HOSPITAL | Age: 73
End: 2018-08-16

## 2018-08-16 DIAGNOSIS — Z01.818 PREOP EXAMINATION: ICD-10-CM

## 2018-08-16 DIAGNOSIS — Z01.810 PRE-OPERATIVE CARDIOVASCULAR EXAMINATION: ICD-10-CM

## 2018-08-16 DIAGNOSIS — Z01.812 PRE-OPERATIVE LABORATORY EXAMINATION: ICD-10-CM

## 2018-08-16 DIAGNOSIS — Z13.89 ENCOUNTER FOR ROUTINE SCREENING FOR MALFORMATION USING ULTRASONICS: ICD-10-CM

## 2018-08-16 DIAGNOSIS — M15.9 OSTEOARTHRITIS OF MULTIPLE JOINTS, UNSPECIFIED OSTEOARTHRITIS TYPE: ICD-10-CM

## 2018-08-16 DIAGNOSIS — D68.59 PRIMARY HYPERCOAGULABLE STATE (HCC): ICD-10-CM

## 2018-08-16 DIAGNOSIS — Z01.818 PREOP EXAMINATION: Primary | ICD-10-CM

## 2018-08-16 LAB
ABO GROUP BLD: NORMAL
ANION GAP SERPL CALCULATED.3IONS-SCNC: 6 MMOL/L (ref 4–13)
APTT PPP: 30 SECONDS (ref 24–36)
ATRIAL RATE: 63 BPM
BASOPHILS # BLD AUTO: 0 THOUSANDS/ΜL (ref 0–0.1)
BASOPHILS NFR BLD AUTO: 1 % (ref 0–2)
BILIRUB UR QL STRIP: NEGATIVE
BLD GP AB SCN SERPL QL: NEGATIVE
BUN SERPL-MCNC: 15 MG/DL (ref 7–25)
CALCIUM SERPL-MCNC: 10 MG/DL (ref 8.6–10.5)
CHLORIDE SERPL-SCNC: 101 MMOL/L (ref 98–107)
CLARITY UR: CLEAR
CO2 SERPL-SCNC: 31 MMOL/L (ref 21–31)
COLOR UR: YELLOW
CREAT SERPL-MCNC: 0.89 MG/DL (ref 0.7–1.3)
EOSINOPHIL # BLD AUTO: 0.5 THOUSAND/ΜL (ref 0–0.61)
EOSINOPHIL NFR BLD AUTO: 8 % (ref 0–5)
ERYTHROCYTE [DISTWIDTH] IN BLOOD BY AUTOMATED COUNT: 13.8 % (ref 11.5–14.5)
GFR SERPL CREATININE-BSD FRML MDRD: 85 ML/MIN/1.73SQ M
GLUCOSE SERPL-MCNC: 100 MG/DL (ref 65–99)
GLUCOSE UR STRIP-MCNC: NEGATIVE MG/DL
HCT VFR BLD AUTO: 43.7 % (ref 36.5–49.3)
HGB BLD-MCNC: 14.9 G/DL (ref 14–18)
HGB UR QL STRIP.AUTO: NEGATIVE
INR PPP: 1 (ref 0.9–1.5)
KETONES UR STRIP-MCNC: NEGATIVE MG/DL
LEUKOCYTE ESTERASE UR QL STRIP: NEGATIVE
LYMPHOCYTES # BLD AUTO: 2.4 THOUSANDS/ΜL (ref 0.6–4.47)
LYMPHOCYTES NFR BLD AUTO: 33 % (ref 21–51)
MCH RBC QN AUTO: 30.2 PG (ref 26–34)
MCHC RBC AUTO-ENTMCNC: 34.2 G/DL (ref 31–37)
MCV RBC AUTO: 88 FL (ref 81–99)
MONOCYTES # BLD AUTO: 0.9 THOUSAND/ΜL (ref 0.17–1.22)
MONOCYTES NFR BLD AUTO: 13 % (ref 2–12)
NEUTROPHILS # BLD AUTO: 3.3 THOUSANDS/ΜL (ref 1.4–6.5)
NEUTS SEG NFR BLD AUTO: 46 % (ref 42–75)
NITRITE UR QL STRIP: NEGATIVE
NRBC BLD AUTO-RTO: 0 /100 WBCS
P AXIS: 50 DEGREES
PH UR STRIP.AUTO: 6 [PH] (ref 5–8)
PLATELET # BLD AUTO: 227 THOUSANDS/UL (ref 149–390)
PMV BLD AUTO: 7.6 FL (ref 8.6–11.7)
POTASSIUM SERPL-SCNC: 4.3 MMOL/L (ref 3.5–5.5)
PR INTERVAL: 160 MS
PROT UR STRIP-MCNC: NEGATIVE MG/DL
PROTHROMBIN TIME: 11.6 SECONDS (ref 10.1–12.9)
QRS AXIS: -6 DEGREES
QRSD INTERVAL: 98 MS
QT INTERVAL: 380 MS
QTC INTERVAL: 388 MS
RBC # BLD AUTO: 4.95 MILLION/UL (ref 4.3–5.9)
RH BLD: POSITIVE
SODIUM SERPL-SCNC: 138 MMOL/L (ref 134–143)
SP GR UR STRIP.AUTO: 1.02 (ref 1–1.03)
SPECIMEN EXPIRATION DATE: NORMAL
T WAVE AXIS: 25 DEGREES
UROBILINOGEN UR QL STRIP.AUTO: 0.2 E.U./DL
VENTRICULAR RATE: 63 BPM
WBC # BLD AUTO: 7.2 THOUSAND/UL (ref 4.8–10.8)

## 2018-08-16 PROCEDURE — 36415 COLL VENOUS BLD VENIPUNCTURE: CPT

## 2018-08-16 PROCEDURE — 93005 ELECTROCARDIOGRAM TRACING: CPT

## 2018-08-16 PROCEDURE — 85730 THROMBOPLASTIN TIME PARTIAL: CPT

## 2018-08-16 PROCEDURE — 81003 URINALYSIS AUTO W/O SCOPE: CPT

## 2018-08-16 PROCEDURE — 86850 RBC ANTIBODY SCREEN: CPT

## 2018-08-16 PROCEDURE — 86901 BLOOD TYPING SEROLOGIC RH(D): CPT

## 2018-08-16 PROCEDURE — 80048 BASIC METABOLIC PNL TOTAL CA: CPT

## 2018-08-16 PROCEDURE — 71046 X-RAY EXAM CHEST 2 VIEWS: CPT

## 2018-08-16 PROCEDURE — 87086 URINE CULTURE/COLONY COUNT: CPT

## 2018-08-16 PROCEDURE — 85610 PROTHROMBIN TIME: CPT

## 2018-08-16 PROCEDURE — 86900 BLOOD TYPING SEROLOGIC ABO: CPT

## 2018-08-16 PROCEDURE — 85025 COMPLETE CBC W/AUTO DIFF WBC: CPT

## 2018-08-17 LAB — BACTERIA UR CULT: NORMAL

## 2018-09-03 ENCOUNTER — ANESTHESIA EVENT (OUTPATIENT)
Dept: PERIOP | Facility: HOSPITAL | Age: 73
DRG: 462 | End: 2018-09-03
Payer: MEDICARE

## 2018-09-03 NOTE — ANESTHESIA PREPROCEDURE EVALUATION
Review of Systems/Medical History  Patient summary reviewed  Chart reviewed      Cardiovascular  EKG reviewed, Hyperlipidemia,   Comment: EKG NSR,  Pulmonary    Comment: CXR NAPD     GI/Hepatic    GERD ,             Endo/Other     GYN       Hematology   Musculoskeletal    Arthritis     Neurology   Psychology         Lab Results   Component Value Date    WBC 7 20 08/16/2018    HGB 14 9 08/16/2018    HCT 43 7 08/16/2018    MCV 88 08/16/2018     08/16/2018     Lab Results   Component Value Date    CALCIUM 10 0 08/16/2018     08/16/2018    K 4 3 08/16/2018    CO2 31 08/16/2018     08/16/2018    BUN 15 08/16/2018    CREATININE 0 89 08/16/2018     Lab Results   Component Value Date    INR 1 00 08/16/2018    INR 1 03 03/22/2018    PROTIME 11 6 08/16/2018    PROTIME 13 5 03/22/2018     Lab Results   Component Value Date    PTT 30 08/16/2018     Physical Exam    Airway    Mallampati score: III  TM Distance: >3 FB  Neck ROM: full     Dental   Comment: 6 teeth remain, lower, upper dentures,     Cardiovascular  Cardiovascular exam normal    Pulmonary  Pulmonary exam normal     Other Findings        Anesthesia Plan  ASA Score- 3     Anesthesia Type- spinal and regional with ASA Monitors  Additional Monitors:   Airway Plan:     Comment: Risks,  benefits and alternatives to Femoral/adductor canal block and Sciatic block explained to pt  and accepted questions answered  Risks, benefits and alternatives to Spinal vs  GA explained to pt  and accepted  Questions answered  Pt agrees to Femoral/adductor canal block and sciatic block and spinal        Plan Factors-    Induction- intravenous  Postoperative Plan-     Informed Consent- Anesthetic plan and risks discussed with patient  I personally reviewed this patient with the CRNA  Discussed and agreed on the Anesthesia Plan with the CRNA  Tori Ellison

## 2018-09-05 ENCOUNTER — APPOINTMENT (OUTPATIENT)
Dept: RADIOLOGY | Facility: HOSPITAL | Age: 73
DRG: 462 | End: 2018-09-05
Payer: MEDICARE

## 2018-09-05 ENCOUNTER — HOSPITAL ENCOUNTER (INPATIENT)
Facility: HOSPITAL | Age: 73
LOS: 2 days | DRG: 462 | End: 2018-09-07
Attending: ORTHOPAEDIC SURGERY | Admitting: ORTHOPAEDIC SURGERY
Payer: MEDICARE

## 2018-09-05 ENCOUNTER — ANESTHESIA (OUTPATIENT)
Dept: PERIOP | Facility: HOSPITAL | Age: 73
DRG: 462 | End: 2018-09-05
Payer: MEDICARE

## 2018-09-05 DIAGNOSIS — M17.0 PRIMARY OSTEOARTHRITIS OF BOTH KNEES: Primary | ICD-10-CM

## 2018-09-05 PROBLEM — K21.9 GASTROESOPHAGEAL REFLUX DISEASE WITHOUT ESOPHAGITIS: Chronic | Status: ACTIVE | Noted: 2018-09-05

## 2018-09-05 PROBLEM — E66.09 CLASS 1 OBESITY DUE TO EXCESS CALORIES WITHOUT SERIOUS COMORBIDITY WITH BODY MASS INDEX (BMI) OF 33.0 TO 33.9 IN ADULT: Chronic | Status: ACTIVE | Noted: 2018-09-05

## 2018-09-05 LAB
ABO GROUP BLD: NORMAL
BLD GP AB SCN SERPL QL: NEGATIVE
RH BLD: POSITIVE
SPECIMEN EXPIRATION DATE: NORMAL

## 2018-09-05 PROCEDURE — 97799 UNLISTED PHYSCL MED/REHAB PX: CPT

## 2018-09-05 PROCEDURE — C1713 ANCHOR/SCREW BN/BN,TIS/BN: HCPCS | Performed by: ORTHOPAEDIC SURGERY

## 2018-09-05 PROCEDURE — C1776 JOINT DEVICE (IMPLANTABLE): HCPCS | Performed by: ORTHOPAEDIC SURGERY

## 2018-09-05 PROCEDURE — 86901 BLOOD TYPING SEROLOGIC RH(D): CPT | Performed by: ORTHOPAEDIC SURGERY

## 2018-09-05 PROCEDURE — 88305 TISSUE EXAM BY PATHOLOGIST: CPT | Performed by: PATHOLOGY

## 2018-09-05 PROCEDURE — 30233N0 TRANSFUSION OF AUTOLOGOUS RED BLOOD CELLS INTO PERIPHERAL VEIN, PERCUTANEOUS APPROACH: ICD-10-PCS | Performed by: ORTHOPAEDIC SURGERY

## 2018-09-05 PROCEDURE — 88311 DECALCIFY TISSUE: CPT | Performed by: PATHOLOGY

## 2018-09-05 PROCEDURE — 86850 RBC ANTIBODY SCREEN: CPT | Performed by: ORTHOPAEDIC SURGERY

## 2018-09-05 PROCEDURE — 73560 X-RAY EXAM OF KNEE 1 OR 2: CPT

## 2018-09-05 PROCEDURE — 99231 SBSQ HOSP IP/OBS SF/LOW 25: CPT | Performed by: PHYSICIAN ASSISTANT

## 2018-09-05 PROCEDURE — 0SRD069 REPLACEMENT OF LEFT KNEE JOINT WITH OXIDIZED ZIRCONIUM ON POLYETHYLENE SYNTHETIC SUBSTITUTE, CEMENTED, OPEN APPROACH: ICD-10-PCS | Performed by: ORTHOPAEDIC SURGERY

## 2018-09-05 PROCEDURE — 97530 THERAPEUTIC ACTIVITIES: CPT

## 2018-09-05 PROCEDURE — 3E0T3BZ INTRODUCTION OF ANESTHETIC AGENT INTO PERIPHERAL NERVES AND PLEXI, PERCUTANEOUS APPROACH: ICD-10-PCS | Performed by: ANESTHESIOLOGY

## 2018-09-05 PROCEDURE — 0SRC069 REPLACEMENT OF RIGHT KNEE JOINT WITH OXIDIZED ZIRCONIUM ON POLYETHYLENE SYNTHETIC SUBSTITUTE, CEMENTED, OPEN APPROACH: ICD-10-PCS | Performed by: ORTHOPAEDIC SURGERY

## 2018-09-05 PROCEDURE — 86900 BLOOD TYPING SEROLOGIC ABO: CPT | Performed by: ORTHOPAEDIC SURGERY

## 2018-09-05 DEVICE — IMPLANTABLE DEVICE
Type: IMPLANTABLE DEVICE | Site: KNEE | Status: FUNCTIONAL
Brand: NEXGEN®

## 2018-09-05 DEVICE — IMPLANTABLE DEVICE: Type: IMPLANTABLE DEVICE | Site: KNEE | Status: FUNCTIONAL

## 2018-09-05 DEVICE — IMPLANTABLE DEVICE
Type: IMPLANTABLE DEVICE | Site: KNEE | Status: FUNCTIONAL
Brand: NEXGEN® LEGACY® GENDER SOLUTIONS™

## 2018-09-05 DEVICE — IMPLANTABLE DEVICE
Type: IMPLANTABLE DEVICE | Site: PATELLA | Status: FUNCTIONAL
Brand: NEXGEN®

## 2018-09-05 DEVICE — IMPLANTABLE DEVICE
Type: IMPLANTABLE DEVICE | Site: KNEE | Status: FUNCTIONAL
Brand: NEXGEN® LEGACY®

## 2018-09-05 RX ORDER — DOCUSATE SODIUM 100 MG/1
100 CAPSULE, LIQUID FILLED ORAL 2 TIMES DAILY
Status: DISCONTINUED | OUTPATIENT
Start: 2018-09-05 | End: 2018-09-07 | Stop reason: HOSPADM

## 2018-09-05 RX ORDER — SODIUM CHLORIDE 9 MG/ML
INJECTION, SOLUTION INTRAVENOUS CONTINUOUS PRN
Status: DISCONTINUED | OUTPATIENT
Start: 2018-09-05 | End: 2018-09-05 | Stop reason: SURG

## 2018-09-05 RX ORDER — EPINEPHRINE 1 MG/ML
INJECTION, SOLUTION, CONCENTRATE INTRAVENOUS AS NEEDED
Status: DISCONTINUED | OUTPATIENT
Start: 2018-09-05 | End: 2018-09-05 | Stop reason: SURG

## 2018-09-05 RX ORDER — SODIUM CHLORIDE, SODIUM LACTATE, POTASSIUM CHLORIDE, CALCIUM CHLORIDE 600; 310; 30; 20 MG/100ML; MG/100ML; MG/100ML; MG/100ML
100 INJECTION, SOLUTION INTRAVENOUS CONTINUOUS
Status: DISCONTINUED | OUTPATIENT
Start: 2018-09-05 | End: 2018-09-05

## 2018-09-05 RX ORDER — DEXAMETHASONE SODIUM PHOSPHATE 4 MG/ML
INJECTION, SOLUTION INTRA-ARTICULAR; INTRALESIONAL; INTRAMUSCULAR; INTRAVENOUS; SOFT TISSUE AS NEEDED
Status: DISCONTINUED | OUTPATIENT
Start: 2018-09-05 | End: 2018-09-05 | Stop reason: SURG

## 2018-09-05 RX ORDER — OXYCODONE HYDROCHLORIDE AND ACETAMINOPHEN 5; 325 MG/1; MG/1
1 TABLET ORAL EVERY 4 HOURS PRN
Status: DISCONTINUED | OUTPATIENT
Start: 2018-09-05 | End: 2018-09-07 | Stop reason: HOSPADM

## 2018-09-05 RX ORDER — KETOROLAC TROMETHAMINE 30 MG/ML
INJECTION, SOLUTION INTRAMUSCULAR; INTRAVENOUS AS NEEDED
Status: DISCONTINUED | OUTPATIENT
Start: 2018-09-05 | End: 2018-09-05 | Stop reason: SURG

## 2018-09-05 RX ORDER — ONDANSETRON 2 MG/ML
INJECTION INTRAMUSCULAR; INTRAVENOUS AS NEEDED
Status: DISCONTINUED | OUTPATIENT
Start: 2018-09-05 | End: 2018-09-05 | Stop reason: SURG

## 2018-09-05 RX ORDER — MIDAZOLAM HYDROCHLORIDE 1 MG/ML
INJECTION INTRAMUSCULAR; INTRAVENOUS AS NEEDED
Status: DISCONTINUED | OUTPATIENT
Start: 2018-09-05 | End: 2018-09-05 | Stop reason: SURG

## 2018-09-05 RX ORDER — FONDAPARINUX SODIUM 2.5 MG/.5ML
2.5 INJECTION SUBCUTANEOUS DAILY
Status: DISCONTINUED | OUTPATIENT
Start: 2018-09-05 | End: 2018-09-05

## 2018-09-05 RX ORDER — MAGNESIUM HYDROXIDE/ALUMINUM HYDROXICE/SIMETHICONE 120; 1200; 1200 MG/30ML; MG/30ML; MG/30ML
30 SUSPENSION ORAL EVERY 6 HOURS PRN
Status: DISCONTINUED | OUTPATIENT
Start: 2018-09-05 | End: 2018-09-07 | Stop reason: HOSPADM

## 2018-09-05 RX ORDER — POVIDONE-IODINE 10 MG/G
OINTMENT TOPICAL AS NEEDED
Status: DISCONTINUED | OUTPATIENT
Start: 2018-09-05 | End: 2018-09-05 | Stop reason: HOSPADM

## 2018-09-05 RX ORDER — ROPIVACAINE HYDROCHLORIDE 5 MG/ML
INJECTION, SOLUTION EPIDURAL; INFILTRATION; PERINEURAL AS NEEDED
Status: DISCONTINUED | OUTPATIENT
Start: 2018-09-05 | End: 2018-09-05 | Stop reason: SURG

## 2018-09-05 RX ORDER — FAMOTIDINE 20 MG/1
20 TABLET, FILM COATED ORAL DAILY
Status: DISCONTINUED | OUTPATIENT
Start: 2018-09-05 | End: 2018-09-07 | Stop reason: HOSPADM

## 2018-09-05 RX ORDER — METHOCARBAMOL 500 MG/1
500 TABLET, FILM COATED ORAL EVERY 6 HOURS PRN
Status: DISCONTINUED | OUTPATIENT
Start: 2018-09-05 | End: 2018-09-07 | Stop reason: HOSPADM

## 2018-09-05 RX ORDER — MORPHINE SULFATE 4 MG/ML
3 INJECTION, SOLUTION INTRAMUSCULAR; INTRAVENOUS EVERY 4 HOURS PRN
Status: DISCONTINUED | OUTPATIENT
Start: 2018-09-05 | End: 2018-09-07 | Stop reason: HOSPADM

## 2018-09-05 RX ORDER — CEFAZOLIN SODIUM 1 G/3ML
INJECTION, POWDER, FOR SOLUTION INTRAMUSCULAR; INTRAVENOUS AS NEEDED
Status: DISCONTINUED | OUTPATIENT
Start: 2018-09-05 | End: 2018-09-05 | Stop reason: SURG

## 2018-09-05 RX ORDER — DEXTROSE AND SODIUM CHLORIDE 5; .45 G/100ML; G/100ML
75 INJECTION, SOLUTION INTRAVENOUS CONTINUOUS
Status: DISCONTINUED | OUTPATIENT
Start: 2018-09-05 | End: 2018-09-06

## 2018-09-05 RX ORDER — FENTANYL CITRATE 50 UG/ML
INJECTION, SOLUTION INTRAMUSCULAR; INTRAVENOUS AS NEEDED
Status: DISCONTINUED | OUTPATIENT
Start: 2018-09-05 | End: 2018-09-05 | Stop reason: SURG

## 2018-09-05 RX ORDER — ONDANSETRON 2 MG/ML
4 INJECTION INTRAMUSCULAR; INTRAVENOUS ONCE AS NEEDED
Status: DISCONTINUED | OUTPATIENT
Start: 2018-09-05 | End: 2018-09-05 | Stop reason: HOSPADM

## 2018-09-05 RX ORDER — FERROUS SULFATE 325(65) MG
325 TABLET ORAL 2 TIMES DAILY WITH MEALS
Status: DISCONTINUED | OUTPATIENT
Start: 2018-09-05 | End: 2018-09-07 | Stop reason: HOSPADM

## 2018-09-05 RX ORDER — METOCLOPRAMIDE HYDROCHLORIDE 5 MG/ML
10 INJECTION INTRAMUSCULAR; INTRAVENOUS ONCE AS NEEDED
Status: DISCONTINUED | OUTPATIENT
Start: 2018-09-05 | End: 2018-09-05 | Stop reason: HOSPADM

## 2018-09-05 RX ORDER — SODIUM CHLORIDE, SODIUM LACTATE, POTASSIUM CHLORIDE, CALCIUM CHLORIDE 600; 310; 30; 20 MG/100ML; MG/100ML; MG/100ML; MG/100ML
125 INJECTION, SOLUTION INTRAVENOUS CONTINUOUS
Status: DISCONTINUED | OUTPATIENT
Start: 2018-09-05 | End: 2018-09-06

## 2018-09-05 RX ORDER — FONDAPARINUX SODIUM 2.5 MG/.5ML
2.5 INJECTION SUBCUTANEOUS DAILY
Status: DISCONTINUED | OUTPATIENT
Start: 2018-09-06 | End: 2018-09-07 | Stop reason: HOSPADM

## 2018-09-05 RX ORDER — PROPOFOL 10 MG/ML
INJECTION, EMULSION INTRAVENOUS CONTINUOUS PRN
Status: DISCONTINUED | OUTPATIENT
Start: 2018-09-05 | End: 2018-09-05 | Stop reason: SURG

## 2018-09-05 RX ORDER — BUPIVACAINE HYDROCHLORIDE 5 MG/ML
INJECTION, SOLUTION PERINEURAL AS NEEDED
Status: DISCONTINUED | OUTPATIENT
Start: 2018-09-05 | End: 2018-09-05 | Stop reason: SURG

## 2018-09-05 RX ADMIN — DEXAMETHASONE SODIUM PHOSPHATE 4 MG: 4 INJECTION, SOLUTION INTRA-ARTICULAR; INTRALESIONAL; INTRAMUSCULAR; INTRAVENOUS; SOFT TISSUE at 07:08

## 2018-09-05 RX ADMIN — SODIUM CHLORIDE, POTASSIUM CHLORIDE, SODIUM LACTATE AND CALCIUM CHLORIDE 125 ML/HR: 600; 310; 30; 20 INJECTION, SOLUTION INTRAVENOUS at 04:55

## 2018-09-05 RX ADMIN — FENTANYL CITRATE 50 MCG: 50 INJECTION INTRAMUSCULAR; INTRAVENOUS at 09:58

## 2018-09-05 RX ADMIN — PROPOFOL 50 MCG/KG/MIN: 10 INJECTION, EMULSION INTRAVENOUS at 07:44

## 2018-09-05 RX ADMIN — MIDAZOLAM HYDROCHLORIDE 2 MG: 1 INJECTION, SOLUTION INTRAMUSCULAR; INTRAVENOUS at 10:19

## 2018-09-05 RX ADMIN — Medication 1 TABLET: at 14:13

## 2018-09-05 RX ADMIN — KETOROLAC TROMETHAMINE 30 MG: 30 INJECTION, SOLUTION INTRAMUSCULAR at 10:56

## 2018-09-05 RX ADMIN — OXYCODONE HYDROCHLORIDE AND ACETAMINOPHEN 1 TABLET: 5; 325 TABLET ORAL at 18:49

## 2018-09-05 RX ADMIN — MIDAZOLAM HYDROCHLORIDE 2 MG: 1 INJECTION, SOLUTION INTRAMUSCULAR; INTRAVENOUS at 07:36

## 2018-09-05 RX ADMIN — BUPIVACAINE HYDROCHLORIDE 8 ML: 5 INJECTION, SOLUTION PERINEURAL at 07:08

## 2018-09-05 RX ADMIN — DEXAMETHASONE SODIUM PHOSPHATE 4 MG: 4 INJECTION, SOLUTION INTRA-ARTICULAR; INTRALESIONAL; INTRAMUSCULAR; INTRAVENOUS; SOFT TISSUE at 06:59

## 2018-09-05 RX ADMIN — DEXTROSE AND SODIUM CHLORIDE 75 ML/HR: 5; 450 INJECTION, SOLUTION INTRAVENOUS at 14:13

## 2018-09-05 RX ADMIN — BUPIVACAINE HYDROCHLORIDE 8 ML: 5 INJECTION, SOLUTION PERINEURAL at 06:59

## 2018-09-05 RX ADMIN — BUPIVACAINE HYDROCHLORIDE 3 ML: 5 INJECTION, SOLUTION PERINEURAL at 07:41

## 2018-09-05 RX ADMIN — GENTAMICIN SULFATE 80 MG: 40 INJECTION, SOLUTION INTRAMUSCULAR; INTRAVENOUS at 07:37

## 2018-09-05 RX ADMIN — MIDAZOLAM HYDROCHLORIDE 2 MG: 1 INJECTION, SOLUTION INTRAMUSCULAR; INTRAVENOUS at 07:07

## 2018-09-05 RX ADMIN — FENTANYL CITRATE 50 MCG: 50 INJECTION INTRAMUSCULAR; INTRAVENOUS at 10:10

## 2018-09-05 RX ADMIN — BUPIVACAINE HYDROCHLORIDE 5 ML: 5 INJECTION, SOLUTION PERINEURAL at 06:42

## 2018-09-05 RX ADMIN — ROPIVACAINE HYDROCHLORIDE 8 ML: 5 INJECTION, SOLUTION EPIDURAL; INFILTRATION; PERINEURAL at 06:59

## 2018-09-05 RX ADMIN — ROPIVACAINE HYDROCHLORIDE 8 ML: 5 INJECTION, SOLUTION EPIDURAL; INFILTRATION; PERINEURAL at 07:08

## 2018-09-05 RX ADMIN — MIDAZOLAM HYDROCHLORIDE 1 MG: 1 INJECTION, SOLUTION INTRAMUSCULAR; INTRAVENOUS at 09:22

## 2018-09-05 RX ADMIN — LIDOCAINE HYDROCHLORIDE 100 MG: 20 INJECTION, SOLUTION INTRAVENOUS at 07:44

## 2018-09-05 RX ADMIN — BUPIVACAINE HYDROCHLORIDE 5 ML: 5 INJECTION, SOLUTION PERINEURAL at 06:53

## 2018-09-05 RX ADMIN — ROPIVACAINE HYDROCHLORIDE 5 ML: 5 INJECTION, SOLUTION EPIDURAL; INFILTRATION; PERINEURAL at 06:42

## 2018-09-05 RX ADMIN — OXYCODONE HYDROCHLORIDE AND ACETAMINOPHEN 1 TABLET: 5; 325 TABLET ORAL at 14:35

## 2018-09-05 RX ADMIN — DOCUSATE SODIUM 100 MG: 100 CAPSULE, LIQUID FILLED ORAL at 17:30

## 2018-09-05 RX ADMIN — CEFAZOLIN 1000 MG: 1 INJECTION, POWDER, FOR SOLUTION INTRAMUSCULAR; INTRAVENOUS at 16:30

## 2018-09-05 RX ADMIN — METHOCARBAMOL 500 MG: 500 TABLET ORAL at 15:45

## 2018-09-05 RX ADMIN — ROPIVACAINE HYDROCHLORIDE 5 ML: 5 INJECTION, SOLUTION EPIDURAL; INFILTRATION; PERINEURAL at 06:53

## 2018-09-05 RX ADMIN — DOCUSATE SODIUM 100 MG: 100 CAPSULE, LIQUID FILLED ORAL at 14:13

## 2018-09-05 RX ADMIN — FAMOTIDINE 20 MG: 20 TABLET, FILM COATED ORAL at 14:13

## 2018-09-05 RX ADMIN — MIDAZOLAM HYDROCHLORIDE 1 MG: 1 INJECTION, SOLUTION INTRAMUSCULAR; INTRAVENOUS at 09:43

## 2018-09-05 RX ADMIN — ONDANSETRON HYDROCHLORIDE 4 MG: 2 INJECTION, SOLUTION INTRAVENOUS at 10:56

## 2018-09-05 RX ADMIN — CEFAZOLIN 1000 MG: 330 INJECTION, POWDER, FOR SOLUTION INTRAMUSCULAR; INTRAVENOUS at 09:42

## 2018-09-05 RX ADMIN — SODIUM CHLORIDE, POTASSIUM CHLORIDE, SODIUM LACTATE AND CALCIUM CHLORIDE: 600; 310; 30; 20 INJECTION, SOLUTION INTRAVENOUS at 07:57

## 2018-09-05 RX ADMIN — SODIUM CHLORIDE: 9 INJECTION, SOLUTION INTRAVENOUS at 07:59

## 2018-09-05 RX ADMIN — FENTANYL CITRATE 100 MCG: 50 INJECTION INTRAMUSCULAR; INTRAVENOUS at 06:41

## 2018-09-05 RX ADMIN — MORPHINE SULFATE 3 MG: 4 INJECTION INTRAVENOUS at 21:01

## 2018-09-05 RX ADMIN — EPINEPHRINE 0.1 MG: 1 INJECTION, SOLUTION INTRAMUSCULAR; SUBCUTANEOUS at 06:59

## 2018-09-05 RX ADMIN — FERROUS SULFATE TAB 325 MG (65 MG ELEMENTAL FE) 325 MG: 325 (65 FE) TAB at 17:29

## 2018-09-05 RX ADMIN — EPINEPHRINE 0.1 MG: 1 INJECTION, SOLUTION INTRAMUSCULAR; SUBCUTANEOUS at 07:08

## 2018-09-05 RX ADMIN — MORPHINE SULFATE 3 MG: 4 INJECTION INTRAVENOUS at 15:44

## 2018-09-05 RX ADMIN — CEFAZOLIN 2000 MG: 1 INJECTION, POWDER, FOR SOLUTION INTRAMUSCULAR; INTRAVENOUS at 07:52

## 2018-09-05 RX ADMIN — MIDAZOLAM HYDROCHLORIDE 2 MG: 1 INJECTION, SOLUTION INTRAMUSCULAR; INTRAVENOUS at 06:41

## 2018-09-05 NOTE — PHYSICAL THERAPY NOTE
PHYSICAL THERAPY NOTE          [facility]  [facility]: [facility]  Saw pt to fit L CPM post L TKR & set at 0-70 degrees per protocol without issue    Ajay Doll, [place]

## 2018-09-05 NOTE — ANESTHESIA PROCEDURE NOTES
Left Adductor canal Block    Patient location during procedure: pre-op  Start time: 9/5/2018 6:53 AM  Removal time: 9/5/2018 6:59 AM  Reason for block: at surgeon's request and post-op pain management  Staffing  Anesthesiologist: Latesha Bowen  Performed: anesthesiologist   Preanesthetic Checklist  Completed: patient identified, site marked, surgical consent, pre-op evaluation, timeout performed, IV checked, risks and benefits discussed and monitors and equipment checked  Peripheral Block  Patient position: supine  Prep: Betadine  Patient monitoring: heart rate, cardiac monitor, continuous pulse ox and frequent blood pressure checks  Block type: adductor canal block  Laterality: left  Injection technique: single-shot  Procedures: ultrasound guided and nerve stimulator  Ultrasound permanent image saved  Needle  Needle type: Stimuplex   Needle gauge: 21 G  Needle length: 10 cm  Needle localization: nerve stimulator and ultrasound guidance  Test dose: negative  Assessment  Injection assessment: incremental injection, local visualized surrounding nerve on ultrasound, negative aspiration for CSF, negative aspiration for heme and no paresthesia on injection  Paresthesia pain: none  Heart rate change: no  Slow fractionated injection: yes  Post-procedure:  site cleaned  patient tolerated the procedure well with no immediate complications  Additional Notes  Nerve Stimulator used: No twitch below 0 4 mAmp

## 2018-09-05 NOTE — OP NOTE
OPERATIVE REPORT  PATIENT NAME: Puja Stinson    :  1945  MRN: [id number]  Pt Location: [facility] OR ROOM 03    SURGERY DATE: 2018    Surgeon(s) and Role:     * Abbi Ott, DO - Primary     * Edgar Blum PA-C    Preop Diagnosis:  Primary osteoarthritis of both knees [M17 0]    Post-Op Diagnosis Codes:     * Primary osteoarthritis of both knees [M17 0]    Procedure(s) (LRB):  KNEE TOTAL ARTHROPLASTY (Bilateral) using the Karolyn NextGen system with the following sizes:  Right knee: F LPS GSF femoral component, #5  Tibial tray, 12 mm polyarticular surface, and a 38 patella button  Left knee: F LPS femoral component, #5  Tibial tray, 12 mm polyarticular surface, and a 38 mm patella button    Specimen(s):  Bonesynovium    Estimated Blood Loss:   Minimal    Drains: solcotrans x 2  Continuous Bladder Irrigation  (Active)   Number of days: 159       Anesthesia Type:   Spinal with femoral nerve and adductor canal blocks    Operative Indications:  Primary osteoarthritis of both knees [M17 0]      Operative Findings:  TT: Right: 78         Left:  82    Complications:   None    Procedure and Technique:        The patient was properly identified and brought to the operative suite  After successful induction of the general anesthetic tourniquets were placed on the patient's bilateral  proximal thighs  The bilateral lower extremities were prepped and draped in the usual usual sterile fashion  It is medically necessary that the physician's assistant be in the room to aid in positioning placing the appropriate amount of retraction the patient      Attention was 1st paid to preparation of the right knee  Surgical landmarks were outlined with skin marker  An Esmarch was used to exsanuate the right lower extremity  The tourniquet was then inflated  Using a  #10  Scalpel blade,  an anterior incision was made on the patient's right knee  Hemostasis was achieved with the use of electrocautery    After dissection through significant amount of adipose tissue, the quadriceps and capsular layer were then located  Using a 2nd   #10  Scalpel blade, a medial parapatellar arthrotomy did occur with the rush of clear synovial cyst noted fluid  A posterior medial  sleeve was developed to level of the semimembranosus tendon  The patella then everted and the  knee was flexed  The retro and super superior fat pads were excised  The cruciate ligaments were then divided  At this point the proximal tibia could easily be anterior subluxed  Attention then paid to preparation of the proximal tibia  The intramedullary jose placed followed by the external cutting jig  The smallest portion of the deficient medial side bone to be removed, with its corresponding lateral side  Varus and  valgus angulation was also checked  The collateral ligament were protected, the neurovascular bundles protected, and the proximal tibia was then osteotomized  Attention then paid to preparation of the distal femur  The intramedullary jose was placed with a 6 degree valgus cut and approximately 3° of external rotation  An additional 2 mm cut occurred because of a  flexion contracture  The collateral ligaments  were then protected, the distal femur was then osteotomized  Next the femur was then sized, a size "F" that the best fit  Both gender and nongender   cutting blocks were checked and  the gender specific cutting block did have the best fit  without any excessive notching of the anterior condyle  The distal femur was then osteotomized in the following sequence 1 anterior condyle 2 posterior condyle 3 posterior chamfer and 4 anterior chamfer  At this point all the bone fragments were then removed  Flexion-extension blocks were placed necks and has good symmetric balancing with a 12 mm block  The femur was then finished with the trochlear recess and notch block placed in a slightly lateral position to facilitate tracking of the  patella   The tibia was then sized and a number 5 did have the best fitt  The size F-GSF femoral component placed, followed by number 5  Tibial tray, followed by several polyarticular surfaces, a 12 mm tray did that the best fit  The patella was inspected next  There was a  tremendous amount of arthritis present  It was confirmed with  the caliper that there was adequate bone stock  Using the ilya  reaming system, 9 mm of undersurface patella then reamed  The patella sized to a 38 mm  This guide was drilled in  a slightly superior medial position to facilitate tracking patella  The tibia was then finished with a drill hole and  keel punch  At this point was good ligament balancing,  Stability,  and range of motion throughout the knee  All trial components were then removed  antibiotic she using pulse lavage  Cement was being mixed on the back table using 3rd generation cementing techniques  The tibia was  cemented in place, followed by distal femur  A trial poly articular surface was placed  Until the cement fully cured  The patella cemented in place held with a patella patellar clamp till the cement fully cured as well  Once the cement cured, all excess cement was removed  The poly articular surfaces  were trialed and once again and a 12 mm tray did the best fit  The final 12 mm tray was placed reduced 1 final time and found be quite stable  After is irrigated a core and sulcal transfer space there is superior aspect incision  The capsule quadriplegia closed with 1  Vicryl  It was irrigated again deeper fascia closed in multiple layers of 0 Vicryl, superficial layers closed with 2 Vicryl, and skin was approximated staples  The was addressed to limit 0 4 4x4s ABDs sterile Webril and Ace bandage  There is no complications during this portion of procedure      Attention was then paid to preparation of the left knee  He was given an additional dose of intravenous antibiotics    Surgical landmarks were outline  With a skin marker  An Esmarch was used to examine the left lower extremity and the tourniquet was then inflated  Using a #10  Scalpel  Blade, an anterior incision was made on patient's left knee  Hemostasis was achieved with the use of electric cautery  Through a  significant amount of dissection through adipose tissue the capsule and  quadricep tendon was  then located  Using a 2nd  #10  Scalpel blade a medial parapatellar arthrotomy did occur with rush of clear synovial fluid  A posterior  medial sleeve was developed to the level of the semi membranous  tendon  The patella then everted and  knee was flexed  The retro and  superior fat pads were excised  The cruciate ligament were  divided  At this point the proximal tibia could  be anterior subluxed  An intramedullary drill hole was placed in the proximal tibia, followed by the external cutting jig  The  smallest portion of deficient medial side bone was to  be removed, with its corresponding lateral side  Varus and  valgus angulation is also checked  The collateral ligament protected  The neurovascular bundle was then protected  The proximal tibia was then osteotomized  Attention then paid to the preparation of distal femur  The intramedullary jose was placed with a 6 degree valgus cut placed approximately 3° of external rotation  The external  cutting jig was placed on top of this  An additional 2 mm cut the because of a flexion contracture  The collateral ligament protected  The  distal femur was then osteotomized  The femur was then sized  A size F did a best fit  Both gender and  standard cutting blocks were checked and a standard specific  cutting block did  Have the best fit  Without any excessive notching of the anterior condyle  The collaterals and then protected, the distal femur was an osteotomized in the  following sequence:  1 anterior condyle 2  posterior condyle 3 posterior chamfer and 4 anterior chamfer    At this point all the bone fragments and then removed  A lamina  was placed both the medial and lateral sides and the redundant menisci and posterior osteophytes were then removed  Flexion-extension blocks were placed next and a 12 mm block gave good symmetric balancing  The femur was then finished with the trochlear recess and notch block placed in a slightly lateral position to facilitate tracking patella  The tibial was sized next  A size 5 did have  best fit  The size F-LPS femoral component placed, followed by a #5 Tibial tray, followed by several polyarticular services and a 12 mm tray did the best fit  The rotation of the  tibia was then marked  The patella was inspected next  There is a trace amount of arthrosis  The of the peripheral osteophytes removed with an removed  With that confirmed a caliper that there is adequate bone stock  And then using his injuries Karolyn reaming system approximately 9 mm of undersurface patella then reamed  The patella sized to a 38 mm patella button  This guide was drilled in a slightly superior superior medial position facilitate tracking patella  The tibia was then finished with a drill hole drill hole and keel punch  At this point there is there is good stability and range of motion in the knee  All the trial components removed  The wound was copiously irrigated sterile antibiotic solution  Cement  was being mixed on the back table was used 3rd generation cementing techniques  The proximal tibia cemented in placed followed by distal femur  A trial poly articular surface was placed till the cement fully cured  The patella was  cemented in place and held with a patellar clamp till the cement fully cured as well  Once the cement fully cured, it was irrigated  The polyarticular surfaces were  tried once again and a 12 mm tray to the best fit  The final 12 mm tray was placed reduced 1 final time and found to be quite stable    After it was irrigated,  a quarter-inch solcotrans  Was  placed near the superior aspect  Of the incision  The quadriceps  and capsule were closed with 1  Vicryl  Deep layer fascia closed multiple layers of 0 Vicryl  Superficial was closed with 2 Vicryl  The skin was approximated  With staples  The wounds were dressed with ointment Xeroform 4x4s ABDs sterile Webril and Ace bandage  There is no complication of procedure  She was successfully awoken,  transferred hospital bed,  And went to the recovery room stable  In condition  This patient underwent a procedure not on the inpatient only list and therefore is subject to the 2 midnight benchmark  Accordingly, in my judgement, the patient will require at least 2 midnights in the hospital receiving acute medical care  The patient is noted to have comorbid conditions which will require management in the babita-operative period prior to safe discharge  Patient is classified as ASA 3   As such the patient will require acute care beyond the usual and routine recovery period for the procedure alone and is therefore appropriate for inpatient admission                   I was present for the entire procedure    Patient Disposition:  PACU     SIGNATURE: Jordana Barnes DO  DATE: September 5, 2018  TIME: 7:33 AM

## 2018-09-05 NOTE — ANESTHESIA PROCEDURE NOTES
Spinal Block    Patient location during procedure: OR  Start time: 9/5/2018 7:31 AM  End time: 9/5/2018 7:41 AM  Reason for block: at surgeon's request and primary anesthetic  Staffing  Anesthesiologist: Michi Krishnamurthy  Performed: anesthesiologist   Preanesthetic Checklist  Completed: patient identified, site marked, surgical consent, pre-op evaluation, timeout performed, IV checked, risks and benefits discussed and monitors and equipment checked  Spinal Block  Patient position: sitting  Prep: Betadine  Patient monitoring: cardiac monitor, heart rate, continuous pulse ox and frequent blood pressure checks  Approach: midline  Location: L3-4  Injection technique: single-shot  Needle  Needle type: pencil-tip   Needle gauge: 25 G  Needle length: 10 cm  Assessment  Sensory level: T4  Events: cerebrospinal fluid  Injection Assessment:  negative aspiration for heme, no paresthesia on injection and positive aspiration for clear CSF  Post-procedure:  site cleaned  Additional Notes  Spinal performed in Sitting Position, betadine X3, Local injected, 25g Pencan used X1 midline  +free flow CSF in 360 before and after injection 15mg IsoBaric Marcaine, w/ Epi wash  No Blood or paresthesia  Pt  Tolerated procedure well, no complications

## 2018-09-05 NOTE — PHYSICAL THERAPY NOTE
PHYSICAL THERAPY NOTE          Patient Name: Rissa Martini  [id number] Date: 9/5/2018  Saw pt for fitting of R CPM post R TKR & set at 0-70 degrees per protocol without issue      Baltazar Chavez, PTA

## 2018-09-05 NOTE — ANESTHESIA PROCEDURE NOTES
Right Sciatic Block    Patient location during procedure: pre-op  Start time: 9/5/2018 6:41 AM  Removal time: 9/5/2018 6:16 AM  Reason for block: at surgeon's request and post-op pain management  Staffing  Anesthesiologist: Ingrid Kaufman  Performed: anesthesiologist   Preanesthetic Checklist  Completed: patient identified, site marked, surgical consent, pre-op evaluation, timeout performed, IV checked, risks and benefits discussed and monitors and equipment checked  Peripheral Block  Patient position: left lateral decubitus  Prep: Betadine  Patient monitoring: heart rate, cardiac monitor, continuous pulse ox and frequent blood pressure checks  Block type: sciatic  Laterality: right  Injection technique: single-shot  Procedures: ultrasound guided and nerve stimulator  Needle  Needle type: Stimuplex   Needle gauge: 21 G  Needle length: 10 cm  Needle localization: nerve stimulator, anatomical landmarks and ultrasound guidance  Test dose: negative  Assessment  Injection assessment: incremental injection, negative aspiration for CSF, negative aspiration for heme and no paresthesia on injection  Paresthesia pain: none  Heart rate change: no  Slow fractionated injection: yes  Post-procedure:  site cleaned  patient tolerated the procedure well with no immediate complications  Additional Notes  Nerve Stimulator started at 0 8 mAmp, Dialed down to  4 mAMp   Loss of twitch below 0 4 mAmp

## 2018-09-05 NOTE — ANESTHESIA POSTPROCEDURE EVALUATION
Post-Op Assessment Note      CV Status:  Stable    Mental Status:  Alert and awake    Hydration Status:  Euvolemic    PONV Controlled:  Controlled    Airway Patency:  Patent    Post Op Vitals Reviewed: Yes          Staff: CRNA           BP   120/80   Temp   96 0   Pulse  86   Resp   16   SpO2   95

## 2018-09-05 NOTE — H&P
H&P Exam - Orthopedics   Luke Allen 68 y o  male MRN: [id number]  Unit/Bed#: OR [place] Encounter: [id number]    Assessment/Plan     Assessment:  DJD of Bilateral knees  Plan:  Elective Bilateral total knee replacements    History of Present Illness   HPI:  Luke Allen is a 68 y o  male who presented to our office complaining of bilateral knee pain  He stated the pain was worsening over the last several months  Radiographic imaging showed advanced DJD of both knees  After unsuccessfully undergoing conservative treatment, the risks and benefits of surgery were discussed with the patient  He has opted for bilateral knee replacements  Review of Systems   Musculoskeletal: Positive for arthralgias  Historical Information   Past Medical History:   Diagnosis Date    Arthritis     Deafness in right ear     Elevated PSA, between 10 and less than 20 ng/ml 03/20/2018    TURP today 3/30/3028    GERD (gastroesophageal reflux disease)     Hearing aid worn     Left ear    History of URI (upper respiratory infection)     States two months ago and was on an antibiotic and was given an inhaler    [place] (hard of hearing)     Left ear    Hyperlipidemia     Knee pain, bilateral     Arthritis    Obese     Wears glasses      Past Surgical History:   Procedure Laterality Date    COLONOSCOPY      HYDROCELE EXCISION / REPAIR      Age 17    KNEE SURGERY Bilateral     Meniscus repair, bilateral     MA TRANSURETHRAL ELEC-SURG PROSTATECTOM N/A 3/30/2018    Procedure: TRANSURETHRAL RESECTION OF PROSTATE (TURP);   Surgeon: Rudolph London MD;  Location[facility] OR;  Service: Urology    TONSILLECTOMY       Social History   History   Alcohol Use    Yes     Comment: 2 monthly     History   Drug Use No     History   Smoking Status    Former Smoker    Packs/day: 2 00    Years: 34 00    Types: Cigarettes    Quit date: 1998   Smokeless Tobacco    Never Used     Family History: non-contributory    Meds/Allergies   all medications and allergies reviewed  No Known Allergies    Objective   Vitals: Blood pressure 145/94, pulse 80, temperature 97 5 °F (36 4 °C), temperature source Tympanic, resp  rate 18, height 5' 10" (1 778 m), weight 107 kg (235 lb), SpO2 98 %  ,Body mass index is 33 72 kg/m²  No intake or output data in the 24 hours ending 09/05/18 0704    No intake/output data recorded  Invasive Devices     Peripheral Intravenous Line            Peripheral IV 09/05/18 Right Hand less than 1 day          Drain            Continuous Bladder Irrigation  158 days                Physical Exam  Ortho Exam   Bilateral lower extremities are neurovascularly intact  Toes are pink and mobile compartments are soft  Varus deformity present, more prominent on left than right  Negative Lachman, drawer or pivot shift  Medial instability with medial and lateral joint line tenderness  Positive patellofemoral crepitation  Lab Results: I have personally reviewed pertinent lab results  Imaging: I have personally reviewed pertinent reports  EKG, Pathology, and Other Studies: I have personally reviewed pertinent reports  Code Status: No Order  Advance Directive and Living Will:      Power of :    POLST:      Counseling / Coordination of Care  Total floor / unit time spent today 30 minutes  Greater than 50% of total time was spent with the patient and / or family counseling and / or coordination of care

## 2018-09-05 NOTE — ANESTHESIA PROCEDURE NOTES
Right Adductor Canal Block    Patient location during procedure: pre-op  Start time: 9/5/2018 7:00 AM  Removal time: 9/5/2018 7:08 AM  Reason for block: at surgeon's request and post-op pain management  Staffing  Anesthesiologist: Mirtha Palacios  Performed: anesthesiologist   Preanesthetic Checklist  Completed: patient identified, site marked, surgical consent, pre-op evaluation, timeout performed, IV checked, risks and benefits discussed and monitors and equipment checked  Peripheral Block  Patient position: supine  Prep: Betadine  Patient monitoring: heart rate, cardiac monitor, continuous pulse ox and frequent blood pressure checks  Block type: adductor canal block  Laterality: right  Injection technique: single-shot  Procedures: ultrasound guided and nerve stimulator  Ultrasound permanent image saved  Needle  Needle type: Stimuplex   Needle gauge: 21 G  Needle length: 10 cm  Needle localization: anatomical landmarks, nerve stimulator and ultrasound guidance  Test dose: negative  Assessment  Injection assessment: incremental injection, local visualized surrounding nerve on ultrasound, negative aspiration for CSF, negative aspiration for heme and no paresthesia on injection  Paresthesia pain: none  Heart rate change: no  Slow fractionated injection: yes  Post-procedure:  site cleaned  patient tolerated the procedure well with no immediate complications  Additional Notes  Nerve Stimulator used: No twitch below 0 4 mAmp

## 2018-09-05 NOTE — CONSULTS
Consult- [place]ia Cardenas 1945, 68 y o  male MRN: [id number]    Unit/Bed#: ICU 02 Encounter: [id number]    Primary Care Provider: Saad Dodd   Date and time admitted to hospital: 9/5/2018  4:01 AM      Inpatient consult to Internal Medicine  Consult performed by: Anu Aquino ordered by: Tosin Hoffman          Gastroesophageal reflux disease without esophagitis   Assessment & Plan    · Continue home meds        Class 1 obesity due to excess calories without serious comorbidity with body mass index (BMI) of 33 0 to 33 9 in adult   Assessment & Plan    · Monitor weight  · Consider dietician consult as OP        * Primary osteoarthritis of both knees   Assessment & Plan    · S/p bilateral TKR  · PT/OT  · CM for d/c plannin  · DVT proph per surgery            VTE Prophylaxis: Fondaparinux (Arixtra)      Recommendations for Discharge:  · Per Primary Service    Counseling / Coordination of Care Time: 30 minutes  Greater than 50% of total time spent on patient counseling and coordination of care  History of Present Illness:  Al[place]ia Cardenas is a 68 y o  male who is originally admitted to the Orthopedic service due to surgery of knees  We are consulted for GERD  Patient has chronic knee pain bilaterally secondary to advanced DJD  He has failed OP treatment options, over the counter medications, physical therapy, injections  Would have to have knees drained and steriod injected  He is now s/p bilater TKR with Dr Cota Co  Wife was concerned the left CPM unit was not moving, nursing evaluated and is now working properly  Review of Systems:  Review of Systems   Constitutional: Negative for chills and fever  HENT: Negative for sore throat and trouble swallowing  Eyes: Negative for discharge  Respiratory: Negative for shortness of breath and wheezing  Cardiovascular: Negative for chest pain and palpitations     Gastrointestinal: Negative for abdominal pain, nausea and vomiting  Genitourinary: Negative for difficulty urinating  Has bejarano   Musculoskeletal: Positive for joint swelling  Negative for back pain  C/o right shoulder and neck pain   Neurological: Negative for dizziness, weakness, light-headedness and headaches  Psychiatric/Behavioral: Negative for hallucinations  Past Medical and Surgical History:   Past Medical History:   Diagnosis Date    Arthritis     Deafness in right ear     Elevated PSA, between 10 and less than 20 ng/ml 03/20/2018    TURP today 3/30/3028    GERD (gastroesophageal reflux disease)     Hearing aid worn     Left ear    History of URI (upper respiratory infection)     States two months ago and was on an antibiotic and was given an inhaler    Unga (hard of hearing)     Left ear    Hyperlipidemia     Knee pain, bilateral     Arthritis    Obese     Wears glasses        Past Surgical History:   Procedure Laterality Date    COLONOSCOPY      HYDROCELE EXCISION / REPAIR      Age 17    KNEE SURGERY Bilateral     Meniscus repair, bilateral     RI TRANSURETHRAL ELEC-SURG PROSTATECTOM N/A 3/30/2018    Procedure: TRANSURETHRAL RESECTION OF PROSTATE (TURP);   Surgeon: Amita Perez MD;  Location: [facility];  Service: Urology    TONSILLECTOMY         Meds/Allergies:  all medications and allergies reviewed    Allergies: No Known Allergies    Social History:     Marital Status: /Civil Union    Substance Use History:   History   Alcohol Use    Yes     Comment: 2 monthly     History   Smoking Status    Former Smoker    Packs/day: 2 00    Years: 34 00    Types: Cigarettes    Quit date: 1998   Smokeless Tobacco    Never Used     History   Drug Use No       Family History:  I have reviewed the patients family history    Physical Exam:   Vitals:   Blood Pressure: 139/91 (09/05/18 1245)  Pulse: 75 (09/05/18 1245)  Temperature: (!) 97 3 °F (36 3 °C) (09/05/18 1300)  Temp Source: Tympanic (09/05/18 0437)  Respirations: 18 (09/05/18 1300)  Height: 5' 10" (177 8 cm) (09/05/18 0437)  Weight - Scale: 107 kg (235 lb) (09/05/18 0437)  SpO2: 95 % (09/05/18 1300)    Physical Exam   Constitutional: He is oriented to person, place, and time  He appears well-developed and well-nourished  HENT:   Head: Normocephalic and atraumatic  Eyes: Conjunctivae and EOM are normal  Right eye exhibits no discharge  Left eye exhibits no discharge  Neck: Normal range of motion  No tracheal deviation present  Cardiovascular: Normal rate, regular rhythm and normal heart sounds  Exam reveals no gallop and no friction rub  No murmur heard  Pulmonary/Chest: Effort normal  No respiratory distress  He has no wheezes  He has no rales  Coarse at bases   Abdominal: Soft  Bowel sounds are normal  He exhibits no distension and no mass  There is no tenderness  There is no rebound and no guarding  Musculoskeletal: He exhibits no edema or tenderness  B/l knees in CPM unit  Neurological: He is alert and oriented to person, place, and time  Able to move toes on right foot and has sensation  Left foot not able to move toes no sensation at this time   Skin: Skin is warm and dry  No rash noted  No erythema  No pallor  Psychiatric: He has a normal mood and affect  His behavior is normal  Judgment and thought content normal    Nursing note and vitals reviewed  Additional Data:   Lab Results: labs pending, follow up in AM            Imaging: results pending  XR knee right 1 or 2 views    (Results Pending)   XR knee left 1 or 2 views    (Results Pending)         ** Please Note: This note has been constructed using a voice recognition system   **

## 2018-09-05 NOTE — ANESTHESIA PROCEDURE NOTES
Left Sciatic Block    Patient location during procedure: pre-op  Start time: 9/5/2018 6:47 AM  Removal time: 9/5/2018 6:52 AM  Staffing  Anesthesiologist: Riley Allan  Performed: anesthesiologist   Preanesthetic Checklist  Completed: patient identified, site marked, surgical consent, pre-op evaluation, timeout performed, IV checked, risks and benefits discussed and monitors and equipment checked  Peripheral Block  Patient position: right lateral decubitus  Prep: Betadine  Patient monitoring: heart rate, cardiac monitor, continuous pulse ox and frequent blood pressure checks  Block type: sciatic  Laterality: left  Injection technique: single-shot  Procedures: ultrasound guided and nerve stimulator  Needle  Needle type: Stimuplex   Needle gauge: 21 G  Needle length: 10 cm  Needle localization: anatomical landmarks and nerve stimulator  Assessment  Injection assessment: incremental injection, negative aspiration for CSF, negative aspiration for heme and no paresthesia on injection  Paresthesia pain: none  Heart rate change: no  Slow fractionated injection: yes  Post-procedure:  site cleaned  patient tolerated the procedure well with no immediate complications  Additional Notes  Nerve Stimulator started at 0 8 mAmp, Dialed down to  4 mAMp   Loss of twitch below 0 4 mAmp

## 2018-09-06 PROBLEM — D62 ACUTE BLOOD LOSS AS CAUSE OF POSTOPERATIVE ANEMIA: Status: ACTIVE | Noted: 2018-09-06

## 2018-09-06 LAB
ANION GAP SERPL CALCULATED.3IONS-SCNC: 5 MMOL/L (ref 4–13)
BUN SERPL-MCNC: 15 MG/DL (ref 7–25)
CALCIUM SERPL-MCNC: 8.6 MG/DL (ref 8.6–10.5)
CHLORIDE SERPL-SCNC: 107 MMOL/L (ref 98–107)
CO2 SERPL-SCNC: 26 MMOL/L (ref 21–31)
CREAT SERPL-MCNC: 0.71 MG/DL (ref 0.7–1.3)
GFR SERPL CREATININE-BSD FRML MDRD: 94 ML/MIN/1.73SQ M
GLUCOSE SERPL-MCNC: 138 MG/DL (ref 65–99)
HCT VFR BLD AUTO: 35.4 % (ref 36.5–49.3)
HGB BLD-MCNC: 12 G/DL (ref 14–18)
POTASSIUM SERPL-SCNC: 4.3 MMOL/L (ref 3.5–5.5)
SODIUM SERPL-SCNC: 138 MMOL/L (ref 134–143)

## 2018-09-06 PROCEDURE — 99231 SBSQ HOSP IP/OBS SF/LOW 25: CPT | Performed by: PHYSICIAN ASSISTANT

## 2018-09-06 PROCEDURE — 97163 PT EVAL HIGH COMPLEX 45 MIN: CPT

## 2018-09-06 PROCEDURE — 85018 HEMOGLOBIN: CPT | Performed by: PHYSICIAN ASSISTANT

## 2018-09-06 PROCEDURE — 85014 HEMATOCRIT: CPT | Performed by: PHYSICIAN ASSISTANT

## 2018-09-06 PROCEDURE — G8978 MOBILITY CURRENT STATUS: HCPCS

## 2018-09-06 PROCEDURE — 97110 THERAPEUTIC EXERCISES: CPT

## 2018-09-06 PROCEDURE — G8988 SELF CARE GOAL STATUS: HCPCS

## 2018-09-06 PROCEDURE — 80048 BASIC METABOLIC PNL TOTAL CA: CPT | Performed by: PHYSICIAN ASSISTANT

## 2018-09-06 PROCEDURE — G8987 SELF CARE CURRENT STATUS: HCPCS

## 2018-09-06 PROCEDURE — G8979 MOBILITY GOAL STATUS: HCPCS

## 2018-09-06 PROCEDURE — 97799 UNLISTED PHYSCL MED/REHAB PX: CPT

## 2018-09-06 PROCEDURE — 97166 OT EVAL MOD COMPLEX 45 MIN: CPT

## 2018-09-06 RX ADMIN — FONDAPARINUX SODIUM 2.5 MG: 2.5 INJECTION, SOLUTION SUBCUTANEOUS at 08:43

## 2018-09-06 RX ADMIN — OXYCODONE HYDROCHLORIDE AND ACETAMINOPHEN 1 TABLET: 5; 325 TABLET ORAL at 04:43

## 2018-09-06 RX ADMIN — OXYCODONE HYDROCHLORIDE AND ACETAMINOPHEN 1 TABLET: 5; 325 TABLET ORAL at 09:02

## 2018-09-06 RX ADMIN — MORPHINE SULFATE 3 MG: 4 INJECTION INTRAVENOUS at 17:44

## 2018-09-06 RX ADMIN — DEXTROSE AND SODIUM CHLORIDE 75 ML/HR: 5; 450 INJECTION, SOLUTION INTRAVENOUS at 04:38

## 2018-09-06 RX ADMIN — DOCUSATE SODIUM 100 MG: 100 CAPSULE, LIQUID FILLED ORAL at 08:42

## 2018-09-06 RX ADMIN — MORPHINE SULFATE 3 MG: 4 INJECTION INTRAVENOUS at 06:49

## 2018-09-06 RX ADMIN — MORPHINE SULFATE 3 MG: 4 INJECTION INTRAVENOUS at 23:25

## 2018-09-06 RX ADMIN — FERROUS SULFATE TAB 325 MG (65 MG ELEMENTAL FE) 325 MG: 325 (65 FE) TAB at 07:17

## 2018-09-06 RX ADMIN — DOCUSATE SODIUM 100 MG: 100 CAPSULE, LIQUID FILLED ORAL at 17:44

## 2018-09-06 RX ADMIN — Medication 1 TABLET: at 08:42

## 2018-09-06 RX ADMIN — FAMOTIDINE 20 MG: 20 TABLET, FILM COATED ORAL at 08:42

## 2018-09-06 RX ADMIN — MORPHINE SULFATE 3 MG: 4 INJECTION INTRAVENOUS at 10:52

## 2018-09-06 RX ADMIN — CEFAZOLIN 1000 MG: 1 INJECTION, POWDER, FOR SOLUTION INTRAMUSCULAR; INTRAVENOUS at 07:17

## 2018-09-06 RX ADMIN — CEFAZOLIN 1000 MG: 1 INJECTION, POWDER, FOR SOLUTION INTRAMUSCULAR; INTRAVENOUS at 00:07

## 2018-09-06 RX ADMIN — FERROUS SULFATE TAB 325 MG (65 MG ELEMENTAL FE) 325 MG: 325 (65 FE) TAB at 17:44

## 2018-09-06 RX ADMIN — OXYCODONE HYDROCHLORIDE AND ACETAMINOPHEN 1 TABLET: 5; 325 TABLET ORAL at 19:41

## 2018-09-06 NOTE — ADDENDUM NOTE
Addendum  created 09/06/18 1151 by Jarvis Kerr MD    Anesthesia Intra Blocks edited, Sign clinical note

## 2018-09-06 NOTE — PROGRESS NOTES
Progress Note - Orthopedics   Carlos Eduardo Paz 68 y o  male MRN: [id number]  Unit/Bed#: ICU 02 Encounter: [id number]    Assessment:  Postop day 1 , status post bilateral total knee replacements    Plan:  Out of bed  Weight bear as tolerated bilateral lower extremities  PT/OT  Arixtra for DVT prophylaxis  Anticipate discharge to rehab over the next 24-48 hours    Weight bearing:   As tolerated    VTE Pharmacologic Prophylaxis: Fondaparinux (Arixtra)  VTE Mechanical Prophylaxis: sequential compression device    Subjective:   Minimal complaints of pain  Blocks started to wear off at approximately 4 o'clock this morning    Vitals: Blood pressure 122/61, pulse 78, temperature 99 1 °F (37 3 °C), temperature source Tympanic, resp  rate 19, height 5' 10" (1 778 m), weight 107 kg (235 lb), SpO2 94 %  ,Body mass index is 33 72 kg/m²  Intake/Output Summary (Last 24 hours) at 09/06/18 0740  Last data filed at 09/06/18 0601   Gross per 24 hour   Intake             7690 ml   Output             6150 ml   Net             1540 ml       Invasive Devices     Peripheral Intravenous Line            Peripheral IV 09/05/18 Right Hand 1 day    Peripheral IV 09/05/18 Left Hand less than 1 day          Drain            Continuous Bladder Irrigation  159 days    Autologus Reinfusion/Drain Device Left 1 day    Autologus Reinfusion/Drain Device Right 1 day                  Ortho Exam:   Bilateral lower extremities are neurovascularly intact  Toes are pink and mobile  Compartments are soft  Dressing is clean dry and intact  Negative Homans  Mild weakness along the right foot, but improving  Pulses intact  Minimal return from drains  Lab, Imaging and other studies: I have personally reviewed pertinent lab results

## 2018-09-06 NOTE — PLAN OF CARE
Problem: DISCHARGE PLANNING - CARE MANAGEMENT  Goal: Discharge to post-acute care or home with appropriate resources  INTERVENTIONS:  - Conduct assessment to determine patient/family and health care team treatment goals, and need for post-acute services based on payer coverage, community resources, and patient preferences, and barriers to discharge  - Address psychosocial, clinical, and financial barriers to discharge as identified in assessment in conjunction with the patient/family and health care team  - Arrange appropriate level of post-acute services according to patient's   needs and preference and payer coverage in collaboration with the physician and health care team  - Communicate with and update the patient/family, physician, and health care team regarding progress on the discharge plan  - Arrange appropriate transportation to post-acute venues  Pt will go to ARU when medically stable    Outcome: Progressing

## 2018-09-06 NOTE — PLAN OF CARE

## 2018-09-06 NOTE — PLAN OF CARE
Problem: PHYSICAL THERAPY ADULT  Goal: Performs mobility at highest level of function for planned discharge setting  See evaluation for individualized goals  Treatment/Interventions: Functional transfer training, LE strengthening/ROM, Therapeutic exercise, Endurance training, Equipment eval/education, Bed mobility, Gait training, Compensatory technique education, Spoke to nursing  Ricco Payton PT            See flowsheet documentation for full assessment, interventions and recommendations  Outcome: Progressing  Prognosis: Excellent  Problem List: Decreased strength, Decreased range of motion, Decreased endurance, Impaired balance, Decreased mobility, Decreased safety awareness, Impaired hearing, Decreased skin integrity, Orthopedic restrictions, Pain  Assessment: Pt in bed & agreed to B LE ther ex, but deferred transfers at this time  He performed B LE AROM as noted in supine  He c/o pain rated at 5/10 at rest & went up to 8/10 with movement  He performed ex to his tolerance & was provided with packet of information about TKR surgery including ther ex for B knees  He verbalized understanding of importance of performing ex to increase strength & AROM  He was motivated & cooperative  He would benefit from cont'd PT to increase strength, ROM, endurance & safe functional mobility  Recommendation: Short-term skilled PT     PT - OK to Discharge: No    See flowsheet documentation for full assessment     Devang Dickey, PTA

## 2018-09-06 NOTE — CASE MANAGEMENT
Initial Clinical Review    Age/Sex: 68 y o  male    Surgery Date: 9/5    Procedure:   BILATERAL  KNEE TOTAL ARTHROPLASTY (Bilateral) using the [employer] system with the following sizes:  Right knee: F LPS GSF femoral component, #5  Tibial tray, 12 mm polyarticular surface, and a 38 patella button  Left knee: F LPS femoral component, #5  Tibial tray, 12 mm polyarticular surface, and a 38 mm patella button    Anesthesia:   Spinal with femoral nerve and adductor canal blocks    Admission Orders: Date/Time/Statement: 9/5/18 @ 1346     Orders Placed This Encounter   Procedures    Inpatient Admission     Standing Status:   Standing     Number of Occurrences:   1     Order Specific Question:   Admitting Physician     Answer:   Kerri Anderson [[id number]]     Order Specific Question:   Level of Care     Answer:   Critical Care [15]     Order Specific Question:   Estimated length of stay     Answer:   More than 2 Midnights     Order Specific Question:   Certification     Answer:   I certify that inpatient services are medically necessary for this patient for a duration of greater than two midnights  See H&P and MD Progress Notes for additional information about the patient's course of treatment  Vital Signs: /61 (BP Location: Left arm)   Pulse 78   Temp 99 1 °F (37 3 °C) (Tympanic)   Resp 19   Ht 5' 10" (1 778 m)   Wt 107 kg (235 lb)   SpO2 94%   BMI 33 72 kg/m²     Diet:        Diet Orders            Start     Ordered    09/05/18 1136  Diet Regular; Regular House  Diet effective now     Question Answer Comment   Diet Type Regular    Regular Regular House    RD to adjust diet per protocol?  Yes        09/05/18 1135          Mobility:   Ambulate w/weight bearing    DVT Prophylaxis: sequential compression device to b/l LE ;  arixtra      Pain Control:   Pain Medications             acetaminophen (TYLENOL) 325 mg tablet Take 650 mg by mouth every 6 (six) hours as needed for mild pain    meloxicam (MOBIC) 15 mg tablet           9/6  · Current hgb 12 0, preop hgb 14 9  · Serial labs    Weaning oxygen - currently @ 2L NC   IVF @ 75  MS IV x3 post op  Percocet po x4 post op  Robaxin po x1 postop   Plan:  Out of bed  Weight bear as tolerated bilateral lower extremities  PT/OT  Arixtra for DVT prophylaxis  Anticipate discharge to rehab over the next 24-48 hours  Subjective:   Minimal complaints of pain  Blocks started to wear off at approximately 4 o'clock this morning          INTERNAL MEDICINE CONSULT 9/5/2018  Gastroesophageal reflux disease without esophagitis   Assessment & Plan     · Continue home meds          Class 1 obesity due to excess calories without serious comorbidity with body mass index (BMI) of 33 0 to 33 9 in adult   Assessment & Plan     · Monitor weight  · Consider dietician consult as OP          * Primary osteoarthritis of both knees   Assessment & Plan     · S/p bilateral TKR  · PT/OT  · CM for d/c plannin  · DVT proph per surgery

## 2018-09-06 NOTE — PROGRESS NOTES
Progress Note - Fred Mail 1945, 68 y o  male MRN: [id number]    Unit/Bed#: ICU 02 Encounter: [id number]    Primary Care Provider: Chaya Shannon   Date and time admitted to hospital: 2018  4:01 AM        Acute blood loss as cause of postoperative anemia   Assessment & Plan    · Current hgb 12 0, preop hgb 14 9  · Serial labs        Gastroesophageal reflux disease without esophagitis   Assessment & Plan    · Continue home meds        * Primary osteoarthritis of both knees   Assessment & Plan    · S/p bilateral TKR  · PT/OT  · CM for d/c planning  · DVT proph per surgery, Arixtra        Class 1 obesity due to excess calories without serious comorbidity with body mass index (BMI) of 33 0 to 33 9 in adult   Assessment & Plan    · Monitor weight  · Consider dietician consult as OP            VTE Pharmacologic Prophylaxis: Pharmacologic: Fondaparinux (Arixtra)    Patient Centered Rounds: I have performed bedside rounds with nursing staff today  Discussions with Specialists or Other Care Team Provider: Case Management  Education and Discussions with Family / Patient: patient    Time Spent for Care: 30 minutes  More than 50% of total time spent on counseling and coordination of care as described above  Current Length of Stay: 1 day(s)    Current Patient Status: Inpatient   Certification Statement: The patient will continue to require additional inpatient hospital stay due to s/p b/l TKR    Discharge Plan: rehab vs home    Code Status: No Order    Subjective:   Patient feeling well  No CP/SOB  No N/V/D  He has bejarano out and has voided  He worked with therapy this morning  Objective:     Vitals:   Temp (24hrs), Av 4 °F (36 3 °C), Min:96 3 °F (35 7 °C), Max:99 1 °F (37 3 °C)    HR:  [69-88] 78  Resp:  [9-28] 19  BP: (108-199)/() 122/61  SpO2:  [92 %-99 %] 94 %  Body mass index is 33 72 kg/m²  Input and Output Summary (last 24 hours):        Intake/Output Summary (Last 24 hours) at 09/06/18 7270  Last data filed at 09/06/18 0933   Gross per 24 hour   Intake             6690 ml   Output             6275 ml   Net              415 ml       Physical Exam:     Physical Exam   Constitutional: He is oriented to person, place, and time  He appears well-developed and well-nourished  HENT:   Head: Normocephalic and atraumatic  Eyes: Conjunctivae and EOM are normal  Right eye exhibits no discharge  Left eye exhibits no discharge  Glasses in place   Neck: Normal range of motion  No tracheal deviation present  Cardiovascular: Normal rate, regular rhythm and normal heart sounds  Exam reveals no gallop and no friction rub  No murmur heard  Pulmonary/Chest: Effort normal and breath sounds normal  No respiratory distress  He has no wheezes  He has no rales  Abdominal: Soft  Bowel sounds are normal  He exhibits no distension  There is no tenderness  There is no rebound  Musculoskeletal: Normal range of motion  He exhibits no edema, tenderness or deformity  Bilateral knees wrapped and ice packs in place, has drains   Neurological: He is alert and oriented to person, place, and time  Moving toes, sensation intact   Skin: Skin is warm and dry  No rash noted  No erythema  No pallor  Psychiatric: He has a normal mood and affect  His behavior is normal  Judgment and thought content normal    Nursing note and vitals reviewed  Additional Data:     Labs:      Results from last 7 days  Lab Units 09/06/18  0452   HEMOGLOBIN g/dL 12 0*   HEMATOCRIT % 35 4*       Results from last 7 days  Lab Units 09/06/18  0452   SODIUM mmol/L 138   POTASSIUM mmol/L 4 3   CHLORIDE mmol/L 107   CO2 mmol/L 26   BUN mg/dL 15   CREATININE mg/dL 0 71   CALCIUM mg/dL 8 6                   * I Have Reviewed All Lab Data Listed Above  * Additional Pertinent Lab Tests Reviewed:  All [facility] Admission  Reviewed    Recent Cultures (last 7 days):           Last 24 Hours Medication List: Current Facility-Administered Medications:  aluminum-magnesium hydroxide-simethicone 30 mL Oral Q6H PRN Nghia Brown PA-C    dextrose 5 % and sodium chloride 0 45 % 75 mL/hr Intravenous Continuous Nghai Brown PA-C Last Rate: Stopped (09/06/18 0915)   docusate sodium 100 mg Oral BID Nghia Brown PA-C    famotidine 20 mg Oral Daily Nghia Brown PA-C    ferrous sulfate 325 mg Oral BID With Meals Nghia Brown PA-C    fondaparinux 2 5 mg Subcutaneous Daily April Edwards DO    lactated ringers 125 mL/hr Intravenous Continuous Axel MD Deven Last Rate: 125 mL/hr (09/05/18 0455)   methocarbamol 500 mg Oral Q6H PRN Fransisco Davidson PA-C    morphine injection 3 mg Intravenous Q4H PRN Nghia Brown PA-C    multivitamin-minerals 1 tablet Oral Daily Nghia Brown PA-C    oxyCODONE-acetaminophen 1 tablet Oral Q4H PRN Nghia Brown PA-C         Today, Patient Was Seen By: Fransisco Davidson PA-C    ** Please Note: Dictation voice to text software may have been used in the creation of this document   **

## 2018-09-06 NOTE — NURSING NOTE
Pt able to move left lower extremity this AM,still with slight numbness,denies pain,pedal pulses present and bounding  CPM 0-70 /venodynes to BLE  Tolerated CPM well  Autotransfusion completed @240AM Haydee removed this AM (see I/O)instructed to use urinal to void  And to call for assistance if needed

## 2018-09-06 NOTE — PHYSICIAN ADVISOR
Current patient class: Inpatient  The patient is currently on Hospital Day: 2 at [address]      The patient was admitted to the hospital at [phone] on 9/5/18 for the following diagnosis:  Primary osteoarthritis of both knees [M17 0]       There is documentation in the medical record of an expected length of stay of at least 2 midnights  The patient is therefore expected to satisfy the 2 midnight benchmark and given the 2 midnight presumption is appropriate for INPATIENT ADMISSION  Given this expectation of a satisfying stay, CMS instructs us that the patient is most often appropriate for inpatient admission under part A provided medical necessity is documented in the chart  After review of the relevant documentation, labs, vital signs and test results, the patient is appropriate for INPATIENT ADMISSION  Admission to the hospital as an inpatient is a complex decision making process which requires the practitioner to consider the patients presenting complaint, history and physical examination and all relevant testing  With this in mind, in this case, the patient was deemed appropriate for INPATIENT ADMISSION  After review of the documentation and testing available at the time of the admission I concur with this clinical determination of medical necessity  Rationale is as follows: The patient is a 78-year-old male who was hospitalized on September 5, 2018 for elective bilateral total knee arthroplasties  It is documented in the operative note that the patient would require at least two midnights in the hospital receiving acute medical care  This was in part due to comorbid conditions which require management in the perioperative period prior to safe discharge  Please see this full documentation  The patient is now POD #1 but is not considered stable for discharge  He continues to receive intravenous morphine in addition to oral medication for pain   The patient has acute blood loss anemia, postoperative anemia  His current status is inpatient  This status should be maintained based on the proposed length of hospitalization for postoperative management  The patients vitals on arrival were ED Triage Vitals   Temperature Pulse Respirations Blood Pressure SpO2   09/05/18 0437 09/05/18 0437 09/05/18 0437 09/05/18 0437 09/05/18 0437   97 5 °F (36 4 °C) 80 18 145/94 98 %      Temp Source Heart Rate Source Patient Position - Orthostatic VS BP Location FiO2 (%)   09/05/18 0437 09/05/18 0437 09/06/18 0000 09/05/18 2200 --   Tympanic Monitor Lying Right arm       Pain Score       09/05/18 1313       No Pain           Past Medical History:   Diagnosis Date    Arthritis     Deafness in right ear     Elevated PSA, between 10 and less than 20 ng/ml 03/20/2018    TURP today 3/30/3028    GERD (gastroesophageal reflux disease)     Hearing aid worn     Left ear    History of URI (upper respiratory infection)     States two months ago and was on an antibiotic and was given an inhaler    [place] (hard of hearing)     Left ear    Hyperlipidemia     Knee pain, bilateral     Arthritis    Obese     Wears glasses      Past Surgical History:   Procedure Laterality Date    COLONOSCOPY      HYDROCELE EXCISION / REPAIR      Age 16   [facility] KNEE SURGERY Bilateral     Meniscus repair, bilateral     DE TOTAL KNEE ARTHROPLASTY Bilateral 9/5/2018    Procedure: KNEE TOTAL ARTHROPLASTY;  Surgeon: Renetta Gannon DO;  Location: [address] [place] [address] MAIN OR;  Service: Orthopedics    DE TRANSURETHRAL ELEC-SURG PROSTATECTOM N/A 3/30/2018    Procedure: TRANSURETHRAL RESECTION OF PROSTATE (TURP);   Surgeon: Bruno Epley, MD;  Location[facility];  Service: Urology    TONSILLECTOMY             Consults have been placed to:   IP CONSULT TO CASE MANAGEMENT  IP CONSULT TO PHYSICAL MEDICINE REHAB  IP CONSULT TO INTERNAL MEDICINE    Vitals:    09/06/18 0400 09/06/18 0500 09/06/18 0600 09/06/18 0700   BP: 143/88 127/67 134/61 122/61   BP Location: Right arm Left arm Left arm Left arm   Pulse: 80 86 75 78   Resp: 21 21 21 19   Temp: 97 8 °F (36 6 °C)   99 1 °F (37 3 °C)   TempSrc: Tympanic   Tympanic   SpO2: 94%      Weight:       Height:           Most recent labs:    Recent Labs      09/06/18 0452   HGB  12 0*   HCT  35 4*   K  4 3   NA  138   CALCIUM  8 6   BUN  15   CREATININE  0 71       Scheduled Meds:  Current Facility-Administered Medications:  aluminum-magnesium hydroxide-simethicone 30 mL Oral Q6H PRN Miguel A Rowland PA-C    docusate sodium 100 mg Oral BID Miguel A Rowland PA-C    famotidine 20 mg Oral Daily Miguel A Rowland PA-C    ferrous sulfate 325 mg Oral BID With Meals Miguel A Rowland PA-C    [facility] Hold] fondaparinux 2 5 mg Subcutaneous Daily Velta Pacer, DO    lactated ringers 125 mL/hr Intravenous Continuous Monica Shelton MD Last Rate: 125 mL/hr (09/05/18 0455)   [MAR Hold] methocarbamol 500 mg Oral Q6H PRN Marilu Miller PA-C    morphine injection 3 mg Intravenous Q4H PRN Miguel A Rowland PA-C    multivitamin-minerals 1 tablet Oral Daily Miguel A Rowland PA-C    oxyCODONE-acetaminophen 1 tablet Oral Q4H PRN Miguel A Rowland PA-C      Continuous Infusions:  lactated ringers 125 mL/hr Last Rate: 125 mL/hr (09/05/18 0455)     PRN Meds:   aluminum-magnesium hydroxide-simethicone    [MAR Hold] methocarbamol    morphine injection    oxyCODONE-acetaminophen    Surgical procedures (if appropriate):  Procedure(s):  KNEE TOTAL ARTHROPLASTY

## 2018-09-06 NOTE — OCCUPATIONAL THERAPY NOTE
Occupational Therapy Evaluation      Annye Pod    9/6/2018    Patient Active Problem List   Diagnosis    Primary osteoarthritis of both knees    Class 1 obesity due to excess calories without serious comorbidity with body mass index (BMI) of 33 0 to 33 9 in adult    Gastroesophageal reflux disease without esophagitis    Acute blood loss as cause of postoperative anemia       Past Medical History:   Diagnosis Date    Arthritis     Deafness in right ear     Elevated PSA, between 10 and less than 20 ng/ml 03/20/2018    TURP today 3/30/3028    GERD (gastroesophageal reflux disease)     Hearing aid worn     Left ear    History of URI (upper respiratory infection)     States two months ago and was on an antibiotic and was given an inhaler    [place] (hard of hearing)     Left ear    Hyperlipidemia     Knee pain, bilateral     Arthritis    Obese     Wears glasses        Past Surgical History:   Procedure Laterality Date    COLONOSCOPY      HYDROCELE EXCISION / REPAIR      Age 17    KNEE SURGERY Bilateral     Meniscus repair, bilateral     [place] TRANSURETHRAL ELEC-SURG PROSTATECTOM N/A 3/30/2018    Procedure: TRANSURETHRAL RESECTION OF PROSTATE (TURP);   Surgeon: Mercedes Hoffmann MD;  Location[facility];  Service: Urology    TONSILLECTOMY        09/06/18 0921   Note Type   Note type Eval/Treat   Restrictions/Precautions   Weight Bearing Precautions Per Order Yes   RLE Weight Bearing Per Order WBAT   LLE Weight Bearing Per Order WBAT   Other Precautions Fall Risk;O2;Telemetry   Pain Assessment   Pain Assessment 0-10   Pain Score 5   Pain Type Surgical pain   Pain Location Knee   Pain Orientation Bilateral   Home Living   Type of Home Mobile home   Home Layout One level;Stairs to enter with rails   Bathroom Shower/Tub Tub/shower unit   Bathroom Toilet Standard   Bathroom Accessibility Accessible   Home Equipment Walker;Cane   Prior Function   Level of [place] Independent with ADLs and functional mobility   Lives With Spouse   Receives Help From Family   ADL Assistance Independent   IADLs Independent   Falls in the last 6 months 0   Vocational Part time employment   Comments Works at [employer] (formerly [employer]), retired from /couldn't get into cab r/t knees   Psychosocial   Psychosocial (WDL) WDL   Subjective   Subjective I plan to go back to work as soon as I can   ADL   Eating Assistance 7  Independent   Grooming Assistance 7  Independent   Grooming Deficit Setup   [address] 4  Minimal Assistance   UB [place]  (Back)   LB Bathing Assistance 2  Maximal Assistance   LB Bathing Deficit Right lower leg including foot; Left lower leg including foot   UB Dressing Assistance 3  Moderate Assistance   LB Dressing Assistance 2  Maximal Assistance   Bed Mobility   Supine to Sit 4  Minimal assistance   Additional items Assist x 1   Sit to Supine 4  Minimal assistance   Additional items Assist x 1   Transfers   Other (Max A X 2 per PT report with sit to stand transfers)   Balance   Static Sitting Good   Dynamic Sitting Fair +   Activity Tolerance   Activity Tolerance Treatment limited secondary to medical complications (Comment); Patient limited by fatigue   RUE Assessment   RUE Assessment WFL   LUE Assessment   LUE Assessment WFL   Hand Function   Gross Motor Coordination Functional   Fine Motor Coordination Functional   Sensation   Light Touch No apparent deficits   Cognition   Overall Cognitive Status WFL   Arousal/Participation Alert; Cooperative   Attention Within functional limits   Orientation Level Oriented X4   Memory Within functional limits   Following Commands Follows one step commands without difficulty   Comments (Pt highly motivated)   Assessment   Limitation Decreased ADL status; Decreased endurance;Decreased self-care trans;Decreased high-level ADLs   Prognosis Good   Assessment Pt is a 68 y o  male seen for OT evaluation s/p admit to [facility] on 9/5/2018 w/ Primary osteoarthritis of both knees  Comorbidities affecting pt's functional performance at time of assessment include: obesity and OA B/L knees s/p B/L TKR's (9-5-18), GERD, post op Anemia  Personal factors affecting pt at time of IE include:steps to enter environment, difficulty performing ADLS and difficulty performing IADLS   Prior to admission, pt was I with all self care ADL's, IADL's, worked part time stocking shelves(plans to return to work)  Upon evaluation: Pt requires Max A with LB self care ADL's and transfers OOB, D for IADL's,  the following deficits impacting occupational performance: decreased tolerance, decreased safety awareness and orthopedic restrictions  Pt to benefit from continued skilled OT tx while in the hospital to address deficits as defined above and maximize level of functional independence w ADL's and functional mobility  Occupational Performance areas to address include: bathing/shower, dressing and functional mobility  From OT standpoint, recommendation at time of d/c would be ARU  Goals   Patient Goals get back to work as soon as I can   STG Time Frame 3-5   Short Term Goal #1 increase knowledge re: adaptive devices and techniques, to enable increased ease and independence with self care ADL's, to enable d/c home   LTG Time Frame 7-10   Long Term Goal #1 increase bed mobility to MI, to prepare for self care ADL's   Long Term Goal #2 increase self toileting to mod A, with good safety demonstrated   ADL Goals   Pt Will Perform Bathing With mod indep   Pt Will Perform UE Dressing With setup   Pt Will Perform LE Dressing With mod indep   Pt Will Perform Toileting With mod assist;With safety/supervision   Plan   Treatment Interventions ADL retraining;Functional transfer training; Endurance training;Patient/family training   Goal Expiration Date 09/16/18   Treatment Day 0   OT Frequency 5x/wk; Weekend   Recommendation   OT Discharge Recommendation Short Term Rehab   Barthel Index   Feeding 10 Bathing 0   Grooming Score 0   Dressing Score 5   Bladder Score 10   Bowels Score 10   Toilet Use Score 0   Transfers (Bed/Chair) Score 5   Mobility (Level Surface) Score 0   Stairs Score 0   Barthel Index Score 40   Kimberly Taylor, OT

## 2018-09-06 NOTE — PHYSICAL THERAPY NOTE
-BP controlled  -See plan under chest pain for med rec's   Physical Therapy Evaluation     Patient's Name: Dahiana Avina    Admitting Diagnosis  Primary osteoarthritis of both knees [M17 0]    Problem List  Patient Active Problem List   Diagnosis    Primary osteoarthritis of both knees    Class 1 obesity due to excess calories without serious comorbidity with body mass index (BMI) of 33 0 to 33 9 in adult    Gastroesophageal reflux disease without esophagitis    Acute blood loss as cause of postoperative anemia       Past Medical History  Past Medical History:   Diagnosis Date    Arthritis     Deafness in right ear     Elevated PSA, between 10 and less than 20 ng/ml 03/20/2018    TURP today 3/30/3028    GERD (gastroesophageal reflux disease)     Hearing aid worn     Left ear    History of URI (upper respiratory infection)     States two months ago and was on an antibiotic and was given an inhaler    [place] (hard of hearing)     Left ear    Hyperlipidemia     Knee pain, bilateral     Arthritis    Obese     Wears glasses        Past Surgical History  Past Surgical History:   Procedure Laterality Date    COLONOSCOPY      HYDROCELE EXCISION / REPAIR      Age 17    KNEE SURGERY Bilateral     Meniscus repair, bilateral     [place] TRANSURETHRAL ELEC-SURG PROSTATECTOM N/A 3/30/2018    Procedure: TRANSURETHRAL RESECTION OF PROSTATE (TURP);   Surgeon: Kelly Herrera MD;  [facility];  Service: Urology    TONSILLECTOMY        09/06/18 0750   Note Type   Note type Eval/Treat   Pain Assessment   Pain Assessment No/denies pain   Pain Score No Pain   Home Living   Type of Home Mobile home   Home Layout One level;Stairs to enter with rails  (3 [address])   Bathroom Shower/Tub Tub/shower unit   Bathroom Toilet Standard   Bathroom Accessibility Accessible   Home Equipment Walker;Cane   Prior Function   Level of [place] Independent with ADLs and functional mobility   Lives With Spouse   Receives Help From Family   ADL Assistance Independent   IADLs Independent [place] in the last 6 months 0   Vocational Retired   Restrictions/Precautions   [facility] Bearing Precautions Per Order Yes   RUE Weight Bearing Per Order FWB   LUE Weight Bearing Per Order FWB   RLE Weight Bearing Per Order WBAT   LLE Weight Bearing Per Order WBAT   Other Precautions O2;Telemetry   General   Family/Caregiver Present No   Cognition   Overall Cognitive Status WFL   Arousal/Participation Alert   Orientation Level Oriented X4   Memory Within functional limits   Following Commands Follows one step commands without difficulty   RUE Assessment   RUE Assessment WFL   LUE Assessment   LUE Assessment WFL   RLE Assessment   RLE Assessment X   Strength RLE   R Knee Flexion 2-/5   R Knee Extension 2-/5   LLE Assessment   LLE Assessment X   Strength LLE   L Knee Flexion 2-/5   L Knee Extension 2-/5   Bed Mobility   Supine to Sit 4  Minimal assistance   Additional items Assist x 1   Sit to Supine 4  Minimal assistance   Additional items Assist x 1   Transfers   Sit to Stand 2  Maximal assistance   Additional items Assist x 2;Other  (3 assist to block B knees due to notable instability, RW)   Stand to Sit 2  Maximal assistance   Additional items Assist x 2;Other  (3 assist to block B knees due to knee instability, RW)   Additional Comments 2 trials of standing, 30 seconds each trial   Balance   Static Sitting Good   Dynamic Sitting Fair +   Static Standing Poor  (due to significant B knee instability)   Endurance Deficit   Endurance Deficit Yes   Endurance Deficit Description increase fatigue with WB tasks   Activity Tolerance   Activity Tolerance Patient limited by fatigue;Treatment limited secondary to medical complications (Comment)   Nurse Made Aware yes Vilma Espinosa present during assessment and assisted   Assessment   Prognosis Excellent   Problem List Decreased strength;Decreased range of motion;Decreased endurance; Impaired balance;Decreased mobility; Decreased safety awareness; Impaired hearing;Orthopedic restrictions;Decreased skin integrity   Assessment Pt is 68 y o  male seen for PT evaluation s/p admit to [facility] ICU on 9/5/2018 w/ Primary osteoarthritis of both knees  PT consulted to assess pt's functional mobility and d/c needs  Order placed for PT eval and tx, w/ up and OOB as tolerated and WBAT B LE order  Comorbidities affecting pt's physical performance at time of assessment include: instability, weakness, and numbness BLE's, post-op anemia, obesity  PTA, pt was independent w/ all functional mobility w/ o AD and lives w/ spouse in one level mobile home  Personal factors affecting pt at time of IE include: ambulating w/ assistive device, stairs to enter home, inability to ambulate household distances, unable to perform dynamic tasks in community, inability to perform IADLs and inability to perform ADLs  Please find objective findings from PT assessment regarding body systems outlined above with impairments and limitations including weakness, decreased ROM, impaired balance, decreased endurance, impaired coordination, gait deviations, decreased activity tolerance, decreased functional mobility tolerance, decreased safety awareness and decreased skin integrity  The following objective measures performed on IE also reveal limitations: Barthel Index: 35/100  Pt's clinical presentation is currently unstable/unpredictable  Pt to benefit from continued PT tx to address deficits as defined above and maximize level of functional independent mobility and consistency  From PT/mobility standpoint, recommendation at time of d/c would be STR pending progress in order to facilitate return to PLOF  Goals   Patient Goals get stronger, move better   [facility] Expiration Date 09/11/18   Short Term Goal #1 1 )Patient will complete bed mobility with supervision of 1 for decrease need for caregiver assistance, decrease burden of care   2 ) Patient will complete transfers with mod A of 2 to decrease risk of falls, facilitate upright standing posture  3 ) BLE strength to greater than/equal to 3-/5 gross musculature to increase ability to safely transfer, control descent to chair  4 ) Patient will exhibit increase static standing balance to Fair , for 45 seconds without LOB and mod A of 1 to improve activity tolerance  5 ) Patient will exhibit increase dynamic ambulatory balance to Fair to improve ability to mobilize to toilet, chair and decrease risk for additional medical complications  6 ) Patient will exhibit good self monitoring and ability to follow 2 step commands to increase complexity of tasks and resume ADL's without LOB  LTG Expiration Date 09/13/18   Long Term Goal #1 1 )Patient will complete bed mobility modified I for decrease need for caregiver assistance, decrease burden of care  2 ) Patient will complete transfers with min A of 2 to decrease risk of falls, facilitate upright standing posture  3 ) BLE strength to greater than/equal to 3/5 gross musculature to increase ability to safely transfer, control descent to chair  4 ) Patient will exhibit increase static standing balance to Fair+ , for 1 minute without LOB and min A of 1 to improve activity tolerance  5 ) Patient will exhibit increase dynamic ambulatory balance to Fair+ to improve ability to mobilize to toilet, chair and decrease risk for additional medical complications  6 ) Patient will exhibit good self monitoring and ability to follow 2 step commands to increase complexity of tasks and resume ADL's without LOB  Plan   Treatment/Interventions Functional transfer training;LE strengthening/ROM; Therapeutic exercise; Endurance training;Equipment eval/education; Bed mobility;Gait training; Compensatory technique education;Spoke to nursing   PT Frequency Other (Comment)  (5-7x/week)   Recommendation   Recommendation Short-term skilled PT   PT - OK to Discharge No   Additional Comments patient was returned to bed with needs met and SCD's, cold packs placed without issue;nurse assisting with breakfast tray     Barthel Index   Feeding 5   Bathing 0   Grooming Score 0   Dressing Score 5   Bladder Score 10   Bowels Score 10   Toilet Use Score 0   Transfers (Bed/Chair) Score 5   Mobility (Level Surface) Score 0   Stairs Score 0   Barthel Index Score 35     Anice Kin, PT

## 2018-09-06 NOTE — SOCIAL WORK
Evaluated the pt at the bedside  Pt indicated he lives in a mobile home with his spouse  Has 3 steps outside to get into the home  Pt is independent and has a walker at home  Pt states he gets his [facility]  Pt had a referal to ARU  Received a TC from Gino Montez on ARU the pt has been accepted by the MD   Pt will go to ARU when medically stable  CM reviewed d/c planning process including the following: identifying help at home, patient preference for d/c planning needs, availability of treatment team to discuss questions or concerns patient and/or family may have regarding understanding medications and recognizing signs and symptoms once discharged  CM also encouraged patient to follow up with all recommended appointments after discharge  Patient advised of importance for patient and family to participate in managing patients medical well being

## 2018-09-06 NOTE — ANESTHESIA POST-OP FOLLOW-UP NOTE
Patient: Kevyn Marti  Procedure(s):  KNEE TOTAL ARTHROPLASTY  Vitals:    09/06/18 1020   BP: 120/67   Pulse: 79   Resp: 20   Temp: 97 8 °F (36 6 °C)   SpO2: 95%       POD #1 s/p TKR bilat  Awake alert, comfortable , no pain  OOB ambulating earlier  No Motor defects noted from adductor and sciatic blocks  Doing well   Continue current RX

## 2018-09-06 NOTE — PHYSICAL THERAPY NOTE
09/06/18 1402   Pain Assessment   Pain Assessment 0-10   Pain Score 8   Pain Type Surgical pain   Pain Location Knee   Pain Orientation Bilateral   Pain Descriptors Aching   Pain Frequency Intermittent   Pain Onset Ongoing   Clinical Progression Not changed   Hospital Pain Intervention(s) Medication (See MAR); Cold applied; Ambulation/increased activity   Restrictions/Precautions   Weight Bearing Precautions Per Order Yes   RLE Weight Bearing Per Order WBAT   LLE Weight Bearing Per Order WBAT   Other Precautions Fall Risk;Pain   General   Family/Caregiver Present No   Cognition   Overall Cognitive Status WFL   Arousal/Participation Alert; Cooperative   Attention Within functional limits   Orientation Level Oriented X4   Memory Within functional limits   Following Commands Follows one step commands without difficulty   Subjective   Subjective "I'll try some ex with you  I don't want the CPMs on until after supper  The bars bother me "   Bed Mobility   Additional Comments pt deferred transfers OOB   Transfers   Additional Comments pt deferred transfers to stand   Activity Tolerance   Activity Tolerance Patient tolerated treatment well   Exercises   Quad Sets Supine;20 reps;Bilateral   Glute Sets Supine;20 reps   Hip Flexion Supine;20 reps;Bilateral   Hip Abduction Supine;20 reps;Bilateral   Ankle Pumps Supine;20 reps;Bilateral   Equipment Use   CPM adjusted Left ROM 0-80 degrees   CPM adjusted Right ROM 0-80 degrees   Assessment   Prognosis Excellent   Problem List Decreased strength;Decreased range of motion;Decreased endurance; Impaired balance;Decreased mobility; Decreased safety awareness; Impaired hearing;Decreased skin integrity;Orthopedic restrictions;Pain   Assessment Pt in bed & agreed to B LE ther ex, but deferred transfers at this time  He performed B LE AROM as noted in supine  He c/o pain rated at 5/10 at rest & went up to 8/10 with movement   He performed ex to his tolerance & was provided with packet of information about TKR surgery including ther ex for B knees  He verbalized understanding of importance of performing ex to increase strength & AROM  He was motivated & cooperative  He would benefit from cont'd PT to increase strength, ROM, endurance & safe functional mobility  Goals   Patient Goals get back to my job   Plan   Treatment/Interventions Functional transfer training;LE strengthening/ROM; Therapeutic exercise; Endurance training;Patient/family training;Equipment eval/education; Bed mobility;Gait training;Spoke to nursing   Progress Progressing toward goals   PT Frequency 5x/wk;7x/wk   Recommendation   Recommendation Short-term skilled PT   PT - OK to Discharge No   Laverda Landau, PTA

## 2018-09-06 NOTE — PLAN OF CARE
Problem: OCCUPATIONAL THERAPY ADULT  Goal: Performs self-care activities at highest level of function for planned discharge setting  See evaluation for individualized goals  Treatment Interventions: ADL retraining, Functional transfer training, Endurance training, Patient/family training          See flowsheet documentation for full assessment, interventions and recommendations  Outcome: Progressing  Limitation: Decreased ADL status, Decreased endurance, Decreased self-care trans, Decreased high-level ADLs  Prognosis: Good  Assessment: Pt is a 68 y o  male seen for OT evaluation s/p admit to [address] on 9/5/2018 w/ Primary osteoarthritis of both knees  Comorbidities affecting pt's functional performance at time of assessment include: obesity and OA B/L knees s/p B/L TKR's (9-5-18), GERD, post op Anemia  Personal factors affecting pt at time of IE include:steps to enter environment, difficulty performing ADLS and difficulty performing IADLS   Prior to admission, pt was I with all self care ADL's, IADL's, worked part time stocking shelves(plans to return to work)  Upon evaluation: Pt requires Max A with LB self care ADL's and transfers OOB, D for IADL's,  the following deficits impacting occupational performance: decreased tolerance, decreased safety awareness and orthopedic restrictions  Pt to benefit from continued skilled OT tx while in the hospital to address deficits as defined above and maximize level of functional independence w ADL's and functional mobility  Occupational Performance areas to address include: bathing/shower, dressing and functional mobility  From OT standpoint, recommendation at time of d/c would be ARU         OT Discharge Recommendation: [facility]

## 2018-09-07 ENCOUNTER — HOSPITAL ENCOUNTER (INPATIENT)
Facility: HOSPITAL | Age: 73
LOS: 10 days | Discharge: HOME HEALTH | DRG: 560 | End: 2018-09-17
Attending: PHYSICAL MEDICINE & REHABILITATION | Admitting: PHYSICAL MEDICINE & REHABILITATION
Payer: MEDICARE

## 2018-09-07 VITALS
WEIGHT: 235 LBS | HEIGHT: 70 IN | SYSTOLIC BLOOD PRESSURE: 154 MMHG | OXYGEN SATURATION: 92 % | TEMPERATURE: 98.2 F | DIASTOLIC BLOOD PRESSURE: 96 MMHG | BODY MASS INDEX: 33.64 KG/M2 | HEART RATE: 109 BPM | RESPIRATION RATE: 18 BRPM

## 2018-09-07 DIAGNOSIS — K21.9 GASTROESOPHAGEAL REFLUX DISEASE WITHOUT ESOPHAGITIS: Chronic | ICD-10-CM

## 2018-09-07 DIAGNOSIS — M17.0 PRIMARY OSTEOARTHRITIS OF BOTH KNEES: Primary | ICD-10-CM

## 2018-09-07 DIAGNOSIS — D62 ACUTE BLOOD LOSS AS CAUSE OF POSTOPERATIVE ANEMIA: ICD-10-CM

## 2018-09-07 LAB
HCT VFR BLD AUTO: 34.3 % (ref 36.5–49.3)
HGB BLD-MCNC: 11.5 G/DL (ref 14–18)

## 2018-09-07 PROCEDURE — 85014 HEMATOCRIT: CPT | Performed by: PHYSICIAN ASSISTANT

## 2018-09-07 PROCEDURE — 99231 SBSQ HOSP IP/OBS SF/LOW 25: CPT | Performed by: PHYSICIAN ASSISTANT

## 2018-09-07 PROCEDURE — 97535 SELF CARE MNGMENT TRAINING: CPT

## 2018-09-07 PROCEDURE — 97530 THERAPEUTIC ACTIVITIES: CPT

## 2018-09-07 PROCEDURE — 85018 HEMOGLOBIN: CPT | Performed by: PHYSICIAN ASSISTANT

## 2018-09-07 RX ORDER — FERROUS SULFATE 325(65) MG
325 TABLET ORAL 2 TIMES DAILY WITH MEALS
Refills: 0
Start: 2018-09-07 | End: 2019-12-31 | Stop reason: ALTCHOICE

## 2018-09-07 RX ORDER — CEPHALEXIN 500 MG/1
500 CAPSULE ORAL EVERY 8 HOURS SCHEDULED
Refills: 0
Start: 2018-09-07 | End: 2018-09-17 | Stop reason: HOSPADM

## 2018-09-07 RX ORDER — DOCUSATE SODIUM 100 MG/1
100 CAPSULE, LIQUID FILLED ORAL 2 TIMES DAILY
Status: CANCELLED | OUTPATIENT
Start: 2018-09-07

## 2018-09-07 RX ORDER — MULTIVIT-MIN/IRON FUM/FOLIC AC 7.5 MG-4
1 TABLET ORAL DAILY
Refills: 0
Start: 2018-09-07

## 2018-09-07 RX ORDER — OXYCODONE HYDROCHLORIDE AND ACETAMINOPHEN 5; 325 MG/1; MG/1
1 TABLET ORAL EVERY 4 HOURS PRN
Status: DISCONTINUED | OUTPATIENT
Start: 2018-09-07 | End: 2018-09-08

## 2018-09-07 RX ORDER — OXYCODONE HYDROCHLORIDE AND ACETAMINOPHEN 5; 325 MG/1; MG/1
1 TABLET ORAL EVERY 4 HOURS PRN
Status: CANCELLED | OUTPATIENT
Start: 2018-09-07

## 2018-09-07 RX ORDER — FAMOTIDINE 20 MG/1
20 TABLET, FILM COATED ORAL DAILY
Status: DISCONTINUED | OUTPATIENT
Start: 2018-09-08 | End: 2018-09-17 | Stop reason: HOSPADM

## 2018-09-07 RX ORDER — OXYCODONE HYDROCHLORIDE AND ACETAMINOPHEN 5; 325 MG/1; MG/1
1 TABLET ORAL EVERY 4 HOURS PRN
Refills: 0
Start: 2018-09-07 | End: 2018-09-17 | Stop reason: HOSPADM

## 2018-09-07 RX ORDER — ACETAMINOPHEN 325 MG/1
650 TABLET ORAL EVERY 6 HOURS PRN
Status: DISCONTINUED | OUTPATIENT
Start: 2018-09-07 | End: 2018-09-17 | Stop reason: HOSPADM

## 2018-09-07 RX ORDER — FONDAPARINUX SODIUM 2.5 MG/.5ML
2.5 INJECTION SUBCUTANEOUS DAILY
Refills: 0 | Status: ON HOLD
Start: 2018-09-08 | End: 2018-09-13

## 2018-09-07 RX ORDER — MAGNESIUM HYDROXIDE/ALUMINUM HYDROXICE/SIMETHICONE 120; 1200; 1200 MG/30ML; MG/30ML; MG/30ML
30 SUSPENSION ORAL EVERY 6 HOURS PRN
Status: CANCELLED | OUTPATIENT
Start: 2018-09-07

## 2018-09-07 RX ORDER — CEPHALEXIN 500 MG/1
500 CAPSULE ORAL EVERY 8 HOURS SCHEDULED
Status: COMPLETED | OUTPATIENT
Start: 2018-09-07 | End: 2018-09-12

## 2018-09-07 RX ORDER — FERROUS SULFATE 325(65) MG
325 TABLET ORAL 2 TIMES DAILY WITH MEALS
Status: DISCONTINUED | OUTPATIENT
Start: 2018-09-07 | End: 2018-09-17 | Stop reason: HOSPADM

## 2018-09-07 RX ORDER — METHOCARBAMOL 500 MG/1
500 TABLET, FILM COATED ORAL EVERY 6 HOURS PRN
Status: DISCONTINUED | OUTPATIENT
Start: 2018-09-07 | End: 2018-09-17 | Stop reason: HOSPADM

## 2018-09-07 RX ORDER — METHOCARBAMOL 500 MG/1
500 TABLET, FILM COATED ORAL EVERY 6 HOURS PRN
Status: CANCELLED | OUTPATIENT
Start: 2018-09-07

## 2018-09-07 RX ORDER — FONDAPARINUX SODIUM 2.5 MG/.5ML
2.5 INJECTION SUBCUTANEOUS DAILY
Status: CANCELLED | OUTPATIENT
Start: 2018-09-08 | End: 2018-10-04

## 2018-09-07 RX ORDER — MAGNESIUM HYDROXIDE/ALUMINUM HYDROXICE/SIMETHICONE 120; 1200; 1200 MG/30ML; MG/30ML; MG/30ML
30 SUSPENSION ORAL EVERY 6 HOURS PRN
Status: DISCONTINUED | OUTPATIENT
Start: 2018-09-07 | End: 2018-09-17 | Stop reason: HOSPADM

## 2018-09-07 RX ORDER — FONDAPARINUX SODIUM 2.5 MG/.5ML
2.5 INJECTION SUBCUTANEOUS DAILY
Status: DISCONTINUED | OUTPATIENT
Start: 2018-09-08 | End: 2018-09-17 | Stop reason: HOSPADM

## 2018-09-07 RX ORDER — DOCUSATE SODIUM 100 MG/1
100 CAPSULE, LIQUID FILLED ORAL 2 TIMES DAILY
Qty: 10 CAPSULE | Refills: 0
Start: 2018-09-07 | End: 2019-12-31 | Stop reason: ALTCHOICE

## 2018-09-07 RX ORDER — FAMOTIDINE 20 MG/1
20 TABLET, FILM COATED ORAL DAILY
Status: CANCELLED | OUTPATIENT
Start: 2018-09-08

## 2018-09-07 RX ORDER — FERROUS SULFATE 325(65) MG
325 TABLET ORAL 2 TIMES DAILY WITH MEALS
Status: CANCELLED | OUTPATIENT
Start: 2018-09-07

## 2018-09-07 RX ORDER — DOCUSATE SODIUM 100 MG/1
100 CAPSULE, LIQUID FILLED ORAL 2 TIMES DAILY
Status: DISCONTINUED | OUTPATIENT
Start: 2018-09-07 | End: 2018-09-17 | Stop reason: HOSPADM

## 2018-09-07 RX ADMIN — FERROUS SULFATE TAB 325 MG (65 MG ELEMENTAL FE) 325 MG: 325 (65 FE) TAB at 08:26

## 2018-09-07 RX ADMIN — MORPHINE SULFATE 3 MG: 4 INJECTION INTRAVENOUS at 08:13

## 2018-09-07 RX ADMIN — FAMOTIDINE 20 MG: 20 TABLET, FILM COATED ORAL at 08:26

## 2018-09-07 RX ADMIN — OXYCODONE HYDROCHLORIDE AND ACETAMINOPHEN 1 TABLET: 5; 325 TABLET ORAL at 15:00

## 2018-09-07 RX ADMIN — Medication 325 MG: at 17:12

## 2018-09-07 RX ADMIN — OXYCODONE HYDROCHLORIDE AND ACETAMINOPHEN 1 TABLET: 5; 325 TABLET ORAL at 20:01

## 2018-09-07 RX ADMIN — Medication 1 TABLET: at 08:26

## 2018-09-07 RX ADMIN — CEPHALEXIN 500 MG: 500 CAPSULE ORAL at 21:46

## 2018-09-07 RX ADMIN — OXYCODONE HYDROCHLORIDE AND ACETAMINOPHEN 1 TABLET: 5; 325 TABLET ORAL at 11:17

## 2018-09-07 RX ADMIN — DOCUSATE SODIUM 100 MG: 100 CAPSULE, LIQUID FILLED ORAL at 17:12

## 2018-09-07 RX ADMIN — METHOCARBAMOL 500 MG: 500 TABLET ORAL at 17:12

## 2018-09-07 RX ADMIN — CEPHALEXIN 500 MG: 500 CAPSULE ORAL at 17:12

## 2018-09-07 RX ADMIN — DOCUSATE SODIUM 100 MG: 100 CAPSULE, LIQUID FILLED ORAL at 08:26

## 2018-09-07 RX ADMIN — FONDAPARINUX SODIUM 2.5 MG: 2.5 INJECTION, SOLUTION SUBCUTANEOUS at 08:26

## 2018-09-07 NOTE — ASSESSMENT & PLAN NOTE
S/P B/L TKA WBAT    PT/OT  Arixtra DVT prophy  Pepcid GI prophy  Percocet for pain  Robaxin for spasms

## 2018-09-07 NOTE — SOCIAL WORK
Discussedd the POC with the interdisciplinary team   Pt has been accepted to ARU and will transfer to same today

## 2018-09-07 NOTE — PROGRESS NOTES
Progress Note - Rocky Pascual 1945, 68 y o  male MRN: [id number]    Unit/Bed#: -01 Encounter: [id number]    Primary Care Provider: Marcos Spangler   Date and time admitted to hospital: 2018  4:01 AM        Acute blood loss as cause of postoperative anemia   Assessment & Plan    · Current hgb 11 5, preop hgb 14 9  · Monitor in ARU        Gastroesophageal reflux disease without esophagitis   Assessment & Plan    · Continue home meds        * Primary osteoarthritis of both knees   Assessment & Plan    · S/p bilateral TKR  · PT/OT  · Possibly to ARU today  · DVT proph per surgery, Arixtra        Class 1 obesity due to excess calories without serious comorbidity with body mass index (BMI) of 33 0 to 33 9 in adult   Assessment & Plan    · Monitor weight  · Consider dietician consult as OP          VTE Pharmacologic Prophylaxis: Pharmacologic: Fondaparinux (Arixtra)    Patient Centered Rounds: I have performed bedside rounds with nursing staff today  Discussions with Specialists or Other Care Team Provider: Ortho ADALBERTO  Education and Discussions with Family / Patient: Patient    Time Spent for Care: 15 minutes  More than 50% of total time spent on counseling and coordination of care as described above  Current Length of Stay: 2 day(s)    Current Patient Status: Inpatient     Discharge Plan: ARU    Code Status: No Order    Subjective:   Patient seen in bed  Has some pain today with working with therapy and drains being removed  He is voiding, no cp/sob, no BM yet  Objective:     Vitals:   Temp (24hrs), Av 6 °F (37 °C), Min:97 8 °F (36 6 °C), Max:99 1 °F (37 3 °C)    HR:  [] 109  Resp:  [18-28] 18  BP: (120-169)/(63-98) 154/96  SpO2:  [92 %-95 %] 92 %  Body mass index is 33 72 kg/m²  Input and Output Summary (last 24 hours):        Intake/Output Summary (Last 24 hours) at 18 0931  Last data filed at 18 0254   Gross per 24 hour   Intake              410 ml Output             1825 ml   Net            -1415 ml     Physical Exam:     Physical Exam   Constitutional: He is oriented to person, place, and time  He appears well-developed and well-nourished  HENT:   Head: Normocephalic and atraumatic  Eyes: Conjunctivae and EOM are normal  Right eye exhibits no discharge  Left eye exhibits no discharge  Glasses in place   Neck: Normal range of motion  No tracheal deviation present  Cardiovascular: Normal rate, regular rhythm and normal heart sounds  Exam reveals no gallop and no friction rub  No murmur heard  Pulmonary/Chest: Effort normal and breath sounds normal  No respiratory distress  He has no wheezes  He has no rales  Abdominal: Soft  Bowel sounds are normal  He exhibits no distension  There is no tenderness  There is no rebound  Musculoskeletal: Normal range of motion  He exhibits no edema, tenderness or deformity  B/l knees with ace wrap, ice packs in place   Neurological: He is alert and oriented to person, place, and time  Sensation intact lower extremities   Skin: Skin is warm and dry  No rash noted  No erythema  No pallor  Psychiatric: He has a normal mood and affect  His behavior is normal  Judgment and thought content normal    Nursing note and vitals reviewed  Additional Data:     Labs:    Results from last 7 days  Lab Units 09/07/18  0534   HEMOGLOBIN g/dL 11 5*   HEMATOCRIT % 34 3*       Results from last 7 days  Lab Units 09/06/18  0452   SODIUM mmol/L 138   POTASSIUM mmol/L 4 3   CHLORIDE mmol/L 107   CO2 mmol/L 26   BUN mg/dL 15   CREATININE mg/dL 0 71   CALCIUM mg/dL 8 6     * I Have Reviewed All Lab Data Listed Above  * Additional Pertinent Lab Tests Reviewed:  [facility] Admission  Reviewed    Imaging:  Imaging Reports Reviewed Today Include: xray knees       Last 24 Hours Medication List:     Current Facility-Administered Medications:  aluminum-magnesium hydroxide-simethicone 30 mL Oral Q6H BRONSON Ken ADALBERTO Darby   docusate sodium 100 mg Oral BID Kenia Mario PA-C   famotidine 20 mg Oral Daily Kenia Mario PA-C   ferrous sulfate 325 mg Oral BID With Meals Kenia Mario PA-C   fondaparinux 2 5 mg Subcutaneous Daily Donnell Connor DO   methocarbamol 500 mg Oral Q6H PRN Shai Quinn PA-C   morphine injection 3 mg Intravenous Q4H PRN Kenia Mario PA-C   multivitamin-minerals 1 tablet Oral Daily Kenia Mario PA-C   oxyCODONE-acetaminophen 1 tablet Oral Q4H PRN Kenia Mario PA-C        Today, Patient Was Seen By: Shai Quinn PA-C    ** Please Note: Dictation voice to text software may have been used in the creation of this document   **

## 2018-09-07 NOTE — PLAN OF CARE
Problem: PHYSICAL THERAPY ADULT  Goal: Performs mobility at highest level of function for planned discharge setting  See evaluation for individualized goals  Treatment/Interventions: Functional transfer training, LE strengthening/ROM, Therapeutic exercise, Endurance training, Equipment eval/education, Bed mobility, Gait training, Compensatory technique education, Spoke to nursing  Sina Banks PT            See flowsheet documentation for full assessment, interventions and recommendations  Outcome: Progressing  Prognosis: Excellent  Problem List: Decreased strength, Decreased range of motion, Decreased endurance, Impaired balance, Decreased mobility, Decreased safety awareness, Impaired hearing, Decreased skin integrity, Orthopedic restrictions, Pain  Assessment: Pt in bed & agreed to try to stand  He c/o severe pain B knees rated at 12/10  He required mod assist x 1 to transfer to sit & max assist x 2 to stand  A third person was needed to block knees due to instability & pt was only able to stand 20 secs due to c/o pain  He deferred any further attempts at PT treatment & was transferred back into supine max assist x 2  He was made comfortable, all needs in rweach, RN present  He would benefit from cont'd PT/STR to increase strength, ROM & safe functional mobility  Recommendation: Short-term skilled PT     PT - OK to Discharge: No    See flowsheet documentation for full assessment     Vidal Vergara PTA

## 2018-09-07 NOTE — PLAN OF CARE
Problem: OCCUPATIONAL THERAPY ADULT  Goal: Performs self-care activities at highest level of function for planned discharge setting  See evaluation for individualized goals  Treatment Interventions: ADL retraining, Functional transfer training, Endurance training, Patient/family training          See flowsheet documentation for full assessment, interventions and recommendations  Outcome: Progressing  Limitation: Decreased ADL status, Decreased endurance, Decreased self-care trans, Decreased high-level ADLs  Prognosis: Good  Assessment: Pt  Provided with self-care/adaptive equipment education  Pt  Was unwilling to move from position in bed at this time secondary to pain  He did have good attention skills and interest -- reports having a long shoehorn at home, and is interested in trying elastic shoelaces  Will benefit from continued rehab  Services       OT Discharge Recommendation: Short Term Rehab     LyubovLUL Barron/L

## 2018-09-07 NOTE — PROGRESS NOTES
Progress Note - Orthopedics   Winifred Rivero 68 y o  male MRN: [id number]  Unit/Bed#: -01 Encounter: [id number]    Assessment:  POD 2 s/p B/L TKR    Plan:  Dressings changed this AM  Leg drains removed this AM  D/C to rehab today  Continue PT/OT WBAT    Weight bearing: WBAT    VTE Pharmacologic Prophylaxis: Fondaparinux (Arixtra)  VTE Mechanical Prophylaxis: sequential compression device    Subjective: No complaints this AM  Pain controlled    Vitals: Blood pressure 154/96, pulse (!) 109, temperature 98 2 °F (36 8 °C), temperature source Tympanic, resp  rate 18, height 5' 10" (1 778 m), weight 107 kg (235 lb), SpO2 92 %  ,Body mass index is 33 72 kg/m²  Intake/Output Summary (Last 24 hours) at 09/07/18 0829  Last data filed at 09/07/18 0254   Gross per 24 hour   Intake              410 ml   Output             1825 ml   Net            -1415 ml       Invasive Devices     Peripheral Intravenous Line            Peripheral IV 09/05/18 Left Hand 2 days    Peripheral IV 09/05/18 Right Hand 2 days          Drain            Autologus Reinfusion/Drain Device Left 2 days    Autologus Reinfusion/Drain Device Right 2 days              Ortho Exam:   Good dorsiflexion B/L  Good plantarflexion B/L  Neuro intact  Incision C/D/I  Compartments soft  Toes are pink and mobile    Negative Lachman's, drawer, pivot shift    Lab, Imaging and other studies:   CBC:   Lab Results   Component Value Date    HGB 11 5 (L) 09/07/2018    HCT 34 3 (L) 09/07/2018

## 2018-09-07 NOTE — PHYSICAL THERAPY NOTE
09/07/18 1121   Pain Assessment   Pain Assessment 0-10   Pain Score Worst Possible Pain   Pain Type Surgical pain   Pain Location Knee   Pain Orientation Bilateral   Pain Descriptors Aching   Pain Frequency Intermittent   Pain Onset Ongoing   Clinical Progression Not changed   Hospital Pain Intervention(s) Medication (See MAR); Cold applied; Ambulation/increased activity   Restrictions/Precautions   Weight Bearing Precautions Per Order Yes   RLE Weight Bearing Per Order WBAT   LLE Weight Bearing Per Order WBAT   Other Precautions Fall Risk;Pain   General   Family/Caregiver Present No   Cognition   Overall Cognitive Status WFL   Arousal/Participation Alert; Cooperative   Attention Within functional limits   Orientation Level Oriented X4   Memory Within functional limits   Following Commands Follows one step commands without difficulty   Subjective   Subjective "I'll do what I can but my pain is a 12/10  Those  ex yesterday killed me about a half hour after I did them  I didn't use the CPMs because they hurt too much too "   Bed Mobility   Rolling L 3  Moderate assistance   Additional items Assist x 1;Bedrails; Increased time required;Verbal cues;LE management   Supine to Sit 3  Moderate assistance   Additional items Assist x 1;Bedrails; Increased time required;Verbal cues;LE management   Sit to Supine 3  Moderate assistance   Additional items Assist x 2;Bedrails; Increased time required;Verbal cues;LE management   Transfers   Sit to Stand 2  Maximal assistance   Additional items Assist x 2  (3rd person to block knees to stand)   Stand to Sit 2  Maximal assistance   Additional items Assist x 2   Balance   Static Sitting Good   Dynamic Sitting Fair +   Static Standing Poor   Endurance Deficit   Endurance Deficit Yes   Endurance Deficit Description increased pain & fatigue with WB   Activity Tolerance   Activity Tolerance Patient limited by fatigue;Patient limited by pain   Nurse Made Aware yes RN present   Assessment Prognosis Excellent   Problem List Decreased strength;Decreased range of motion;Decreased endurance; Impaired balance;Decreased mobility; Decreased safety awareness; Impaired hearing;Decreased skin integrity;Orthopedic restrictions;Pain   Assessment Pt in bed & agreed to try to stand  He c/o severe pain B knees rated at 12/10  He required mod assist x 1 to transfer to sit & max assist x 2 to stand  A third person was needed to block knees due to instability & pt was only able to stand 20 secs due to c/o pain  He deferred any further attempts at PT treatment & was transferred back into supine max assist x 2  He was made comfortable, all needs in [facility], RN present  He would benefit from cont'd PT/STR to increase strength, ROM & safe functional mobility  Goals   Patient Goals have less pain   Treatment Day 1   Plan   Treatment/Interventions Functional transfer training;LE strengthening/ROM; Therapeutic exercise; Endurance training;Patient/family training;Equipment eval/education; Bed mobility;Gait training;Spoke to nursing;Spoke to case management   Progress Slow progress, decreased activity tolerance   PT Frequency 5x/wk;7x/wk   Recommendation   Recommendation Short-term skilled PT   PT - OK to Discharge Tammy Cardoza PTA

## 2018-09-07 NOTE — H&P
H&P- Navdeep List 1945, 68 y o  male MRN: [id number]    Unit/Bed#: -01 Encounter: [id number]    Primary Care Provider: Elvin Fernandes   Date and time admitted to hospital: 9/7/2018  1:55 PM        * Primary osteoarthritis of both knees   Assessment & Plan    S/P B/L TKA WBAT    PT/OT  Arixtra DVT prophy  Pepcid GI prophy  Percocet for pain  Robaxin for spasms        Acute blood loss as cause of postoperative anemia   Assessment & Plan    Post op anemia    FeSO4  Monitor CBC        Gastroesophageal reflux disease without esophagitis   Assessment & Plan    GERD    Pepcid            PRIMARY REHAB IMPAIRMENT CATEGORY:Ortho- Joint replacement    ADMITTING REHAB DIAGNOSIS: B/L TKA          History of Present Illness:   68year old male presented to the hospital for elective bilateral total knee replacements done on 9/7/18  Rafael Tk He tolerated the procedure without issue  On POD 1 his hemoglobin was stable and he was able to dorsiflex and plantarflex bilaterally  On POD 2 his dressings were changed and leg drains removed  His incisions were clean dry and intact  He was then deemed suitable for discharge to rehab  Subjective: Patient is alert,  appears well, in NAD  Review of Systems: A 14-point review of systems was performed  Negative except as listed above  Plan:   - Acute comprehensive interdisciplinary inpatient rehabilitation including PT, OT, SLP, RN, CM, SW, dietary, psychology, etc     CODE: Level 1: Full Code    Restrictions include:  wbat  Fall precautions      Functional History/ Status: PTA I ADLS & Mobility  On admission to rehab I eating & grooming  Mod A UE dressing, max A LE dressing, total A transfers  RW        Functional Status: Patient will be independent with mobility/ambulation, transfers, ADL's, IADL's  Social History:    Navdeep Garcia lives with their spouse    He lives in [facility]) single family home, mobile home  The living area: can live on one level  Equipment in home: Rolling Walker and [address]  There 3 steps to enter the home  Rails  Patient/family's goals: Return to previous home/apartment  The patient will have 24 hour supervision available upon discharge  Retired  and   Drives       Physical Exam:    Temp:  [96 2 °F (35 7 °C)-99 1 °F (37 3 °C)] 96 2 °F (35 7 °C)  HR:  [] 107  Resp:  [16-18] 16  BP: (124-169)/() 148/102    General: alert, no apparent distress, cooperative and comfortable  Head: Normal, normocephalic, atraumatic  Eye: Normal external eye, conjunctiva, lids   Ears: Normal external ears  Nose: Normal external nose, mucus membranes  Pharynx: poor dentition  Normal buccal mucosa  Normal pharynx  Pulmonary: no retractions, no abnormal respiratory pattern, chest expansion normal  Cardiovascular: normal rate, regular rhythm, normal S1, S2, no murmurs, rubs, clicks or gallops  Abdomen: soft, nontender, nondistended, no masses or organomegaly  Skin/Extremity: b/l knee dressings & ace wraps in place  Neurologic: normal without focal findings, mental status, speech normal, alert and oriented x3, ZAY and reflexes normal and symmetric  MUSCULOSKELETAL: limited rom b/l knees  Psych: normal affect, pleasant, cooperative    Laboratory:      Results from last 7 days  Lab Units 09/07/18  0534 09/06/18  0452   HEMOGLOBIN g/dL 11 5* 12 0*   HEMATOCRIT % 34 3* 35 4*       Results from last 7 days  Lab Units 09/06/18  0452   BUN mg/dL 15   SODIUM mmol/L 138   POTASSIUM mmol/L 4 3   CHLORIDE mmol/L 107   CREATININE mg/dL 0 71            Wt Readings from Last 1 Encounters:   09/07/18 106 kg (234 lb 3 2 oz)     Estimated body mass index is 33 6 kg/m² as calculated from the following:    Height as of this encounter: 5' 10" (1 778 m)      Weight as of this encounter: 106 kg (234 lb 3 2 oz)          Rehabilitation Prognosis: good     Tolerance for three hours of therapy a day: good     Family/Patient Goals:  Patient/family's goals: Return to previous home/apartment  Patient will receive PT and  OT 90 minutes each per day, five days per week to achieve rehab goals  Discharge Planning:  Rehabilitation and discharge goals discussed with the patient and/or family  Case Managment and Social Work to review patient/family resources and to coordinate Discharge Planning  Estimated length of stay: 7-14d    Patient and Family Education and Training:  Rehabilitation and discharge goals discussed with the patient and/or family  Patient/family education/training needs to be discussed in weekly team meeting  Past Medical History:   Past Surgical History:   Family History:   Social history:   Past Medical History:   Diagnosis Date    Arthritis     Deafness in right ear     Elevated PSA, between 10 and less than 20 ng/ml 03/20/2018    TURP today 3/30/3028    GERD (gastroesophageal reflux disease)     Hearing aid worn     Left ear    History of URI (upper respiratory infection)     States two months ago and was on an antibiotic and was given an inhaler    [place] (hard of hearing)     Left ear    Hyperlipidemia     Knee pain, bilateral     Arthritis    Obese     Wears glasses     Past Surgical History:   Procedure Laterality Date    COLONOSCOPY      HYDROCELE EXCISION / REPAIR      Age 17    KNEE SURGERY Bilateral     Meniscus repair, bilateral     CO TOTAL KNEE ARTHROPLASTY Bilateral 9/5/2018    Procedure: KNEE TOTAL ARTHROPLASTY;  Surgeon: Renetta Gannon DO;  Location: [facility] MAIN OR;  Service: Orthopedics    CO TRANSURETHRAL ELEC-SURG PROSTATECTOM N/A 3/30/2018    Procedure: TRANSURETHRAL RESECTION OF PROSTATE (TURP);   Surgeon: Bruno Epley, MD;  Location: [facility] Main [facility];  Service: Urology    TONSILLECTOMY       Family History   Problem Relation Age of Onset    Aneurysm Mother     Asthma Father       Social History     Social History    Marital status: /Civil Union     Spouse name: N/A    Number of children: N/A    Years of education: N/A     Social History Main Topics    Smoking status: Former Smoker     Packs/day: 2 00     Years: 34 00     Types: Cigarettes     Quit date: 1998    Smokeless tobacco: Never Used    Alcohol use Yes      Comment: 2 monthly    Drug use: No    Sexual activity: Not Currently     Other Topics Concern    Not on file     Social History Narrative    No narrative on file          Current Medical Diagnosis Medications Allergies   Patient Active Problem List   Diagnosis    Primary osteoarthritis of both knees    Class 1 obesity due to excess calories without serious comorbidity with body mass index (BMI) of 33 0 to 33 9 in adult    Gastroesophageal reflux disease without esophagitis    Acute blood loss as cause of postoperative anemia      Current Facility-Administered Medications:     aluminum-magnesium hydroxide-simethicone (MYLANTA) 200-200-20 mg/5 mL oral suspension 30 mL, 30 mL, Oral, Q6H PRN, Bobby Chavez MD    docusate sodium (COLACE) capsule 100 mg, 100 mg, Oral, BID, Bobby Chavez MD  [facility]  [START ON 9/8/2018] famotidine (PEPCID) tablet 20 mg, 20 mg, Oral, Daily, Bobby Chavez MD    ferrous sulfate tablet 325 mg, 325 mg, Oral, BID With Meals, Bobby Chavez MD  [facility]  [START ON 9/8/2018] fondaparinux (ARIXTRA) subcutaneous injection 2 5 mg, 2 5 mg, Subcutaneous, Daily, Bobby Chavez MD    methocarbamol (ROBAXIN) tablet 500 mg, 500 mg, Oral, Q6H PRN, Bobby Chavez MD    oxyCODONE-acetaminophen (PERCOCET) 5-325 mg per tablet 1 tablet, 1 tablet, Oral, Q4H PRN, Bobby Chavez MD No Known Allergies        Medical Necessity Criteria for [facility] Admission: Anemia   In addition, the preadmission screen, post-admission physical evaluation, overall plan of care and admissions order demonstrate a reasonable expectation that the following criteria were met at the time of admission to the [place] [address]  The patient requires active and ongoing therapeutic intervention of multiple therapy disciplines (physical therapy, occupational therapy, speech-language pathology, or prosthetics/orthotics), one of which is physical or occupational therapy  2  Patient requires an intensive rehabilitation therapy program, as defined in Chapter 1, section 110 2 2 of the CMS Medicare Policy Manual  This intensive rehabilitation therapy program will consist of at least 3 hours of therapy per day at least 5 days per week or at least 15 hours of intensive rehabilitation therapy within a 7 consecutive day period, beginning with the date of admission to the [facility]  3  The patient is reasonably expected to actively participate in, and benefit significantly from, the intensive rehabilitation therapy program as defined in Chapter 1, section 110 2 2 of the CMS Medicare Policy Manual at this time of admission to the [facility]  He can reasonably be expected to make measurable improvement (that will be of practical value to improve the patients functional capacity or adaptation to impairments) as a result of the rehabilitation treatment, as defined in section 110 3, and such improvement can be expected to be made within the prescribed period of time  As noted in the CMS Medicare Policy Manual, the patient need not be expected to achieve complete independence in the domain of self-care nor be expected to return to his or her prior level of functioning in order to meet this standard  4  The patient must require physician supervision by a rehabilitation physician  As such, a rehabilitation physician will conduct face-to-face visits with the patient at least 3 days per week throughout the patients stay in the [facility] to assess the patient both medically and functionally, as well as to modify the course of treatment as needed to maximize the patients capacity to benefit from the rehabilitation process    5  The patient requires an intensive and coordinated interdisciplinary approach to providing rehabilitation, as defined in Chapter 1, section 110 2 5 of the CMS Medicare Policy Manual  This will be achieved through periodic team conferences, conducted at least once in a 7-day period, and comprising of an interdisciplinary team of medical professionals consisting of: a rehabilitation physician, registered nurse,  and/or , and a licensed/certified therapist from each therapy discipline involved in treating the patient  Changes Since Pre-admission Assessment: None -This patient's participation in rehab continues to be reasonable, necessary and appropriate  CMS Required Post-Admission Physician Evaluation Elements  History and Physical, including medical history, functional history and active comorbidities as in above text      PostAdmission Physician Evaluation:  The patient has the potential to make improvement and is in need of physical, occupational, and/or therapy services  The patient may also need nutritional services  Given the patient's complex medical condition and risk of further medical complications, rehabilitative services cannot be safely provided at a lower level of care, such as a skilled nursing facility  I have reviewed the patient's functional and medical status at the time of the preadmission screening and they are the same as on the day of this admission  I acknowledge that I have personally performed a full physical examination on this patient within 24 hours of admission  The patient demonstrated understanding the rehabilitation program and the discharge process after we discussed them      Agree in entirety: yes  Minor adaptions: none    Major changes: none     Arturo Breen MD  Physical Medicine and Rehabilitation    ** Please Note: Fluency Direct voice to text software may have been used in the creation of this document   **

## 2018-09-07 NOTE — NURSING NOTE
Patient doing well on our unit after arrival from Med Surg  Patient was a max assist times two with walker to get out of wheelchair  Voices pain in bilateral knees and medicated for same with some relief noted  Bilateral incisions clean, dry, intact and covered with ABD and ace wrap  Compliant with SCD's  Oriented to unit and call bell in reach

## 2018-09-07 NOTE — OCCUPATIONAL THERAPY NOTE
09/07/18 0940   Restrictions/Precautions   Weight Bearing Precautions Per Order Yes   RUE Weight Bearing Per Order FWB   LUE Weight Bearing Per Order FWB   RLE Weight Bearing Per Order WBAT   LLE Weight Bearing Per Order WBAT   Other Precautions Fall Risk;Pain   Pain Assessment   Pain Assessment 0-10   Pain Score 9   Pain Type Surgical pain   Pain Location Knee   Pain Orientation Bilateral   Hospital Pain Intervention(s) Medication (See MAR); Distraction   Response to Interventions fair    ADL   Equipment Provided Other (Comment)  (provided instruction and demonstration in all AE for LB care)   Functional Mobility   Additional Comments Pt  reports "I can't move my legs right now - i have too much pain in them "   Cognition   Overall Cognitive Status [facility]   Arousal/Participation Alert   Attention Within functional limits   Orientation Level Oriented X4   Activity Tolerance   Activity Tolerance Other (Comment)  (unable to assess)   Assessment   Assessment Pt  Provided with self-care/adaptive equipment education  Pt  Was unwilling to move from position in bed at this time secondary to pain  He did have good attention skills and interest -- reports having a long shoehorn at home, and is interested in trying elastic shoelaces  Will benefit from continued rehab  Services     Plan   Treatment Interventions ADL retraining;Patient/family training;Equipment evaluation/education   Goal Expiration Date 09/16/18   Treatment Day 172 L[facility] Nava/L

## 2018-09-07 NOTE — DISCHARGE SUMMARY
Discharge Summary - Aristeo Salinas 68 y o  male MRN: [id number]    Unit/Bed#: -01 Encounter: [id number]    Admission Date:   Admission Orders     Ordered        09/05/18 1346  Inpatient Admission  Once               Admitting Diagnosis: Primary osteoarthritis of both knees [M17 0]        Procedures Performed: B/L Total knee replacements    Summary of Hospital Course:   68year old male presented to the hospital for elective bilateral total knee replacements  He tolerated the procedure without issue  On POD 1 his hemoglobin was stable and he was able to dorsiflex and plantarflex bilaterally  On POD 2 his dressings were changed and leg drains removed  His incisions were clean dry and intact  He was then deemed suitable for discharge to rehab  Discharge Diagnosis: Primary osteoarthritis of both knees    Resolved Problems  Date Reviewed: 9/7/2018    None          Condition at Discharge: stable         Discharge instructions/Information to patient and family:   See after visit summary for information provided to patient and family  Provisions for Follow-Up Care:  See after visit summary for information related to follow-up care and any pertinent home health orders  PCP: Aleshia William    Disposition: Short-term rehab at [facility]    Planned Readmission: No    Discharge Statement   I spent 30 minutes discharging the patient  This time was spent on the day of discharge  I had direct contact with the patient on the day of discharge  Additional documentation is required if more than 30 minutes were spent on discharge  Discharge Medications:  See after visit summary for reconciled discharge medications provided to patient and family

## 2018-09-08 LAB
ANION GAP SERPL CALCULATED.3IONS-SCNC: 7 MMOL/L (ref 4–13)
BASOPHILS # BLD AUTO: 0 THOUSANDS/ΜL (ref 0–0.1)
BASOPHILS NFR BLD AUTO: 0 % (ref 0–2)
BUN SERPL-MCNC: 14 MG/DL (ref 7–25)
CALCIUM SERPL-MCNC: 9.2 MG/DL (ref 8.6–10.5)
CHLORIDE SERPL-SCNC: 102 MMOL/L (ref 98–107)
CO2 SERPL-SCNC: 28 MMOL/L (ref 21–31)
CREAT SERPL-MCNC: 0.66 MG/DL (ref 0.7–1.3)
EOSINOPHIL # BLD AUTO: 0.3 THOUSAND/ΜL (ref 0–0.61)
EOSINOPHIL NFR BLD AUTO: 3 % (ref 0–5)
ERYTHROCYTE [DISTWIDTH] IN BLOOD BY AUTOMATED COUNT: 13.3 % (ref 11.5–14.5)
GFR SERPL CREATININE-BSD FRML MDRD: 97 ML/MIN/1.73SQ M
GLUCOSE SERPL-MCNC: 107 MG/DL (ref 65–99)
HCT VFR BLD AUTO: 33.2 % (ref 36.5–49.3)
HGB BLD-MCNC: 11.3 G/DL (ref 14–18)
LYMPHOCYTES # BLD AUTO: 2.4 THOUSANDS/ΜL (ref 0.6–4.47)
LYMPHOCYTES NFR BLD AUTO: 23 % (ref 21–51)
MCH RBC QN AUTO: 30.1 PG (ref 26–34)
MCHC RBC AUTO-ENTMCNC: 34 G/DL (ref 31–37)
MCV RBC AUTO: 89 FL (ref 81–99)
MONOCYTES # BLD AUTO: 1.6 THOUSAND/ΜL (ref 0.17–1.22)
MONOCYTES NFR BLD AUTO: 16 % (ref 2–12)
NEUTROPHILS # BLD AUTO: 6.1 THOUSANDS/ΜL (ref 1.4–6.5)
NEUTS SEG NFR BLD AUTO: 59 % (ref 42–75)
NRBC BLD AUTO-RTO: 0 /100 WBCS
PLATELET # BLD AUTO: 158 THOUSANDS/UL (ref 149–390)
PMV BLD AUTO: 8.6 FL (ref 8.6–11.7)
POTASSIUM SERPL-SCNC: 3.7 MMOL/L (ref 3.5–5.5)
RBC # BLD AUTO: 3.75 MILLION/UL (ref 4.3–5.9)
SODIUM SERPL-SCNC: 137 MMOL/L (ref 134–143)
WBC # BLD AUTO: 10.4 THOUSAND/UL (ref 4.8–10.8)

## 2018-09-08 PROCEDURE — 97166 OT EVAL MOD COMPLEX 45 MIN: CPT

## 2018-09-08 PROCEDURE — 80048 BASIC METABOLIC PNL TOTAL CA: CPT | Performed by: PHYSICAL MEDICINE & REHABILITATION

## 2018-09-08 PROCEDURE — 97530 THERAPEUTIC ACTIVITIES: CPT

## 2018-09-08 PROCEDURE — 97161 PT EVAL LOW COMPLEX 20 MIN: CPT

## 2018-09-08 PROCEDURE — 85025 COMPLETE CBC W/AUTO DIFF WBC: CPT | Performed by: PHYSICAL MEDICINE & REHABILITATION

## 2018-09-08 PROCEDURE — 99232 SBSQ HOSP IP/OBS MODERATE 35: CPT | Performed by: PHYSICIAN ASSISTANT

## 2018-09-08 PROCEDURE — 97112 NEUROMUSCULAR REEDUCATION: CPT

## 2018-09-08 PROCEDURE — 97110 THERAPEUTIC EXERCISES: CPT

## 2018-09-08 PROCEDURE — 97116 GAIT TRAINING THERAPY: CPT

## 2018-09-08 PROCEDURE — 97535 SELF CARE MNGMENT TRAINING: CPT

## 2018-09-08 RX ORDER — OXYCODONE HYDROCHLORIDE AND ACETAMINOPHEN 5; 325 MG/1; MG/1
2 TABLET ORAL EVERY 4 HOURS PRN
Status: DISCONTINUED | OUTPATIENT
Start: 2018-09-08 | End: 2018-09-08

## 2018-09-08 RX ORDER — LIDOCAINE 50 MG/G
2 PATCH TOPICAL DAILY
Status: DISCONTINUED | OUTPATIENT
Start: 2018-09-08 | End: 2018-09-17 | Stop reason: HOSPADM

## 2018-09-08 RX ORDER — MORPHINE SULFATE 100 MG/5ML
5 SOLUTION ORAL ONCE
Status: COMPLETED | OUTPATIENT
Start: 2018-09-08 | End: 2018-09-08

## 2018-09-08 RX ORDER — OXYCODONE HYDROCHLORIDE 5 MG/1
10 TABLET ORAL EVERY 4 HOURS PRN
Status: DISCONTINUED | OUTPATIENT
Start: 2018-09-08 | End: 2018-09-15

## 2018-09-08 RX ORDER — OXYCODONE HYDROCHLORIDE 5 MG/1
15 TABLET ORAL EVERY 4 HOURS PRN
Status: DISCONTINUED | OUTPATIENT
Start: 2018-09-08 | End: 2018-09-15

## 2018-09-08 RX ADMIN — CEPHALEXIN 500 MG: 500 CAPSULE ORAL at 21:30

## 2018-09-08 RX ADMIN — DOCUSATE SODIUM 100 MG: 100 CAPSULE, LIQUID FILLED ORAL at 17:15

## 2018-09-08 RX ADMIN — OXYCODONE HYDROCHLORIDE AND ACETAMINOPHEN 2 TABLET: 5; 325 TABLET ORAL at 08:20

## 2018-09-08 RX ADMIN — FAMOTIDINE 20 MG: 20 TABLET, FILM COATED ORAL at 08:20

## 2018-09-08 RX ADMIN — OXYCODONE HYDROCHLORIDE AND ACETAMINOPHEN 1 TABLET: 5; 325 TABLET ORAL at 04:25

## 2018-09-08 RX ADMIN — MORPHINE SULFATE 5 MG: 100 SOLUTION ORAL at 11:03

## 2018-09-08 RX ADMIN — Medication 325 MG: at 08:20

## 2018-09-08 RX ADMIN — CEPHALEXIN 500 MG: 500 CAPSULE ORAL at 13:49

## 2018-09-08 RX ADMIN — OXYCODONE HYDROCHLORIDE 10 MG: 5 TABLET ORAL at 19:52

## 2018-09-08 RX ADMIN — DOCUSATE SODIUM 100 MG: 100 CAPSULE, LIQUID FILLED ORAL at 08:20

## 2018-09-08 RX ADMIN — OXYCODONE HYDROCHLORIDE 10 MG: 5 TABLET ORAL at 13:49

## 2018-09-08 RX ADMIN — METHOCARBAMOL 500 MG: 500 TABLET ORAL at 05:47

## 2018-09-08 RX ADMIN — FONDAPARINUX SODIUM 2.5 MG: 2.5 INJECTION, SOLUTION SUBCUTANEOUS at 08:21

## 2018-09-08 RX ADMIN — CEPHALEXIN 500 MG: 500 CAPSULE ORAL at 05:47

## 2018-09-08 RX ADMIN — Medication 1 TABLET: at 08:20

## 2018-09-08 RX ADMIN — Medication 325 MG: at 17:15

## 2018-09-08 RX ADMIN — LIDOCAINE 2 PATCH: 50 PATCH TOPICAL at 08:26

## 2018-09-08 NOTE — ASSESSMENT & PLAN NOTE
S/P B/L TKA WBAT with pain    PT/OT  Arixtra DVT prophy  Pepcid GI prophy  DC Percocet  Add OxyIR and Lidoderm  Robaxin for spasms  Keflex to complete 5 d

## 2018-09-08 NOTE — PROGRESS NOTES
OT Evaluation      09/08/18 0700   Patient Data   Rehab Impairment Ortho - B/L TKR   Date of Onset 09/05/18   Support System   Relationship spouse    [facility]    Able to provide physical help? 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)   Transportation    Prior IADL Participation   Meal Preparation Other  (wife assists)   Laundry Other  (wife assists)   Home Cleaning Other  (wife assists )   Prior Level of Function   Self-Care 3  Independent - Patient completed the activities by him/herself, with or without an assistive device, with no assistance from a helper  Indoor-Mobility (Ambulation) 3  Independent - Patient completed the activities by him/herself, with or without an assistive device, with no assistance from a helper  Stairs 3  Independent - Patient completed the activities by him/herself, with or without an assistive device, with no assistance from a helper  Functional Cognition 3  Independent - Patient completed the activities by him/herself, with or without an assistive device, with no assistance from a helper  Patient Preference   Nickname (Patient Preference) Garrett   Psychosocial   Psychosocial (WDL) WDL   Patient Behaviors/Mood Anxious; Appropriate for situation; Cooperative;Pleasant   Restrictions/Precautions   Weight Bearing Restrictions No   RUE Weight Bearing Per Order WBAT   LUE Weight Bearing Per Order WBAT   RLE Weight Bearing Per Order WBAT   LLE Weight Bearing Per Order WBAT   ROM Restrictions No   Pain Assessment   Pain Assessment 0-10   Pain Score 7  (7 pre tx, 10 post tx)   Pain Type Acute pain   Pain Location Knee   Pain Orientation Bilateral   QI: [address] Provided by Busy No physical assistance   Eating CARE Score 6   QI: Oral Hygiene   Assistance Needed Set-up / [address] [facility] Provided by Busy No physical assistance   Oral Hygiene CARE Score 5   Grooming   Able To Initiate Tasks; Wash/Dry Face;Brush/Clean Teeth;Wash/Dry Hands   Limitation Noted In Strength   Grooming (FIM) 5 - Busy sets up supplies or applies device   QI: Shower/Bathe Self   Assistance Needed Physical assistance   Assistance Provided by Busy 50%-74%   Comment for areas below knees   Shower/Bathe Self CARE Score 2   Bathing   Assessed Bath Style Sponge Bath   Anticipated D/C Bath Style Shower   Able to [employer] No   Able to Wash/Rinse/Dry (body part) Left Arm;Right Arm;L Upper Leg;R Upper Leg;Chest;Abdomen   Limitations Noted in Balance; Endurance;ROM;Strength   Positioning Seated;Standing   Bathing (FIM) 3 - Patient completes 5/10  6/10 or 7/10 parts   QI: Upper Body Dressing   Assistance Needed Set-up / [address] [facility] Provided by Busy No physical assistance   Upper Body Dressing CARE Score 5   QI: Lower Body Dressing   Assistance Needed Physical assistance   Assistance Provided by Busy 75% or more   Comment to jeff shorts, socks   Lower Body Dressing CARE Score 2   QI: Putting On/Taking Off Footwear   Assistance Needed Physical assistance   Assistance Provided by Busy Total assistance   Comment for socks   Putting On/Taking Off Footwear CARE Score 1   Dressing/Undressing Clothing   Remove UB Clothes Pullover [address] LB Clothes Shorts;Socks   Limitations Noted In Balance; Endurance;Strength;ROM   UB Dressing (FIM) 5 - Busy sets up supplies or applies device   LB Dressing (FIM) 2 - Patient completes 25-49% of all tasks   QI: [address] Needed Physical assistance   Assistance Provided by Busy Total assistance   Comment in standing post BM   Toileting Hygiene CARE Score 1   Toileting   Able to [address] down no, up no  Manage Hygiene Bowel   Limitations Noted In Balance; Safety   Adaptive Equipment Grab Bar   Toileting (FIM) 1 - Patient completes less than 25% of all tasks   Bowel/Bladder Management   Current Bowel Elimination Raised seat   Current Bladder Elimination Urinal   Bowel Elimination at Discharge Toilet   Bladder Elimination at Discharge Toilet   QI: Lying to Sitting on Side of Bed   Assistance Needed Physical assistance   Assistance Provided by [place] 25%-49%   Comment for LE management   Lying to Sitting on Side of Bed CARE Score 3   QI: Sit to Stand   Assistance Needed Physical assistance   Assistance Provided by [place] 25%-49%   Comment of 2 staff   Sit to Stand CARE Score 3   QI: Chair/Bed-to-Chair Transfer   Assistance Needed Physical assistance   Assistance Provided by [place] 25%-49%   Comment of 2 staff   Chair/Bed-to-Chair Transfer CARE Score 3   Transfer Bed/Chair/Wheelchair   Sit to Stand Moderate Assist;Assist x 2   Stand to Sit Moderate Assist;Assist x 2   Supine to Sit Moderate Assist;Assist x 1   Bed, Chair, Wheelchair Transfer (FIM) 1 - Patient requires assist of two people   QI: Toilet Transfer   Assistance Needed Physical assistance   Assistance Provided by [place] 75% or more   Comment x 2 staff   Toilet Transfer CARE Score 2   Toilet Transfer   Surface Assessed Raised Toilet   Transfer Technique Stand Pivot   Limitations Noted In Balance; Endurance;Confidence   Toilet Transfer (FIM) 1 - Patient requires assist of two people   Nohemy   Cooperation with staff   Participation Individual   Social Interaction (FIM) 7 - Interacts appropriately without assistive device, medication or helper   Problem Solving   Problem solving (FIM) 5 - Solves basic problems 90% of time   Memory   Memory (FIM) 7 - Recognizes people frequently encountered   RUE Assessment   RUE Assessment WFL   LUE Assessment   LUE Assessment WFL   Coordination   Movements are Fluid and Coordinated 1   Sensation   Light Touch No apparent deficits   Cognition   Overall Cognitive Status WFL   Discharge Information   Patient's Discharge Plan home with spouse   OT Therapy Minutes   OT Time In 0700   OT Time Out 0800   OT Total Time (minutes) 60   OT Mode of treatment - Individual (minutes) 60       Patient seen for initial evaluation this date following B/L TKR completed 9/5/18  Appropriate for acute OT services to address above noted ADL and functional mobility deficits  Anticipating return to home with assist of wife once medically cleared - patient hopeful to return to work ASAP  Patient pleasant and motivated - main limitation is significant pain in bilateral knees

## 2018-09-08 NOTE — PROGRESS NOTES
PT NOTE:       09/08/18 1030   Pain Assessment   Pain Assessment 0-10   Pain Score 8   Pain Type Surgical pain   Pain Location Knee   Pain Orientation Bilateral   Restrictions/Precautions   RLE Weight Bearing Per Order WBAT   LLE Weight Bearing Per Order WBAT   QI: Sit to Lying   Assistance Needed Physical assistance   Assistance Provided by [place] 50%-74%   Sit to Lying CARE Score 2   QI: Sit to Stand   Assistance Needed Physical assistance   Assistance Provided by [place] 25%-49%   Comment (patient required cues for safety)   Sit to Stand CARE Score 3   QI: Chair/Bed-to-Chair Transfer   Assistance Needed Physical assistance   Assistance Provided by [place] 50%-74%   Chair/Bed-to-Chair Transfer CARE Score 2   Transfer Bed/Chair/Wheelchair   Stand Pivot Moderate Assist   Sit to Stand Moderate Assist   Sit to Supine Moderate Assist   Bed, Chair, Wheelchair Transfer (FIM) 2 - Patient completes 25 - 49% of all tasks   QI: Walk 10 Feet   Assistance Needed Physical assistance   Assistance Provided by [place] 25%-49%   Comment 10'   Walk 10 Feet CARE Score 3   Ambulation   Does the patient walk? 2  Yes   Primary Discharge Mode of Locomotion Walk   Walk Assist Level Moderate Assist   Gait Pattern Antalgic; Inconsistant Yomaira; Slow Yomaira;Decreased foot clearance   Assist Device Yary Collado Walked (feet) (10)   Walking (FIM) 1 - Patient ambulates less than 49 feet regardless of assist/device/set up   QI: 1 Step (Curb)   Reason if not Attempted Safety concerns   1 Step (Curb) CARE Score 88   QI: 4 Steps   Reason if not Attempted Safety concerns   4 Steps CARE Score 88   QI: 12 Steps   Reason if not Attempted Safety concerns   12 Steps CARE Score 88   Therapeutic Interventions   Strengthening (BLE TE on all planes)   Balance (standing tolerance training)   Assessment   Treatment Assessment (Patient tolerated the treatment well)   Plan   Treatment/Interventions LE strengthening/ROM; Therapeutic exercise; Endurance training;Patient/family training;Bed mobility;Gait training   PT Therapy Minutes   PT Time In 1030   PT Time Out 1130   PT Total Time (minutes) 60   PT Mode of treatment - Concurrent (minutes) 60   Therapy Time missed   Time missed? No       Patient tolerated the treatment this AM, he was able to ambulate farther with decrease pain, patient still required multiple cues for hand placement , sequencing and safety  He was educated on self exercise program to increase mobility on LE  Performed stretching  On BLE knee to increase ROM   He will continue to benefit from skilled therapy services to maximize his LOF

## 2018-09-08 NOTE — ASSESSMENT & PLAN NOTE
· S/p B/L TKR  · Pain control: oxy ir 10 mod 15mg severe  · Will give 1 dose stat of Morphine 5mg solution  · PT/OT rehab services per Dr Dickey Home

## 2018-09-08 NOTE — NURSING NOTE
Patient with severe pain in bilateral knees in AM prior to therapy  Medicated for pain with percocet with minimal relief  Dr Craig Sargent notified and new order for oxy IR  1 time dose of morphine SL given to patient with relief noted  Patient more relaxed and pain controlled later in shift  Patient able to move in and out of bed with assist times one with walker  Compliant with SCD's  Will continue to monitor and follow plan of care

## 2018-09-08 NOTE — CONSULTS
Consult- Tanya Strauss 1945, 68 y o  male MRN: [id number]    Unit/Bed#: [facility] 221-01 Encounter: [id number]    Primary Care Provider: Vera Ambrosio   Date and time admitted to hospital: 9/7/2018  1:55 PM      Inpatient consult to Internal Medicine  Consult performed by: Christopher Rivero ordered by: Glenna Ernandez        Acute blood loss as cause of postoperative anemia   Assessment & Plan    · HGB stable        * Primary osteoarthritis of both knees   Assessment & Plan    · S/p B/L TKR  · Pain control: Percocet 5/325mg 1 mod, 2 severe pain  · PT/OT rehab services per Dr Conner Hernandez        Gastroesophageal reflux disease without esophagitis   Assessment & Plan    · Pepcid        Class 1 obesity due to excess calories without serious comorbidity with body mass index (BMI) of 33 0 to 33 9 in adult   Assessment & Plan    · BMI 33 6          VTE Prophylaxis: Fondaparinux (Arixtra)      Recommendations for Discharge:  · Per Primary Service    Counseling / Coordination of Care Time: 45 minutes  Greater than 50% of total time spent on patient counseling and coordination of care  History of Present Illness:  Tanya Strauss is a 68 y o  male who is originally admitted to the Acute Rehab Unit due to bilateral knee replacement  We are consulted for monitor hgb and uncontrolled pain  Patient had b/l TKR on 9/5/18  He was doing well and has been transferred to the ARU for therapy services  Today he is complaining of significant pain, nurse called and his Percocet dose was adjusted  Dr Conner Hernandez has now changed to oxy ir 10mg and 15mg  Will give 1 dose oral morphine    Review of Systems:  Review of Systems   Constitutional: Negative for chills and fever  HENT: Negative for trouble swallowing  Eyes: Negative for pain  Respiratory: Negative for shortness of breath and wheezing  Cardiovascular: Negative for chest pain     Gastrointestinal: Negative for abdominal distention, constipation, nausea and vomiting  Genitourinary: Negative for dysuria  Musculoskeletal: Positive for arthralgias and joint swelling  Skin: Negative  Neurological: Negative for dizziness and headaches  Psychiatric/Behavioral: Negative for confusion  Past Medical and Surgical History:   Past Medical History:   Diagnosis Date    Arthritis     Deafness in right ear     Elevated PSA, between 10 and less than 20 ng/ml 03/20/2018    TURP today 3/30/3028    GERD (gastroesophageal reflux disease)     Hearing aid worn     Left ear    History of URI (upper respiratory infection)     States two months ago and was on an antibiotic and was given an inhaler    [place] (hard of hearing)     Left ear    Hyperlipidemia     Knee pain, bilateral     Arthritis    Obese     Wears glasses        Past Surgical History:   Procedure Laterality Date    COLONOSCOPY      HYDROCELE EXCISION / REPAIR      Age 17    KNEE SURGERY Bilateral     Meniscus repair, bilateral     IN TOTAL KNEE ARTHROPLASTY Bilateral 9/5/2018    Procedure: KNEE TOTAL ARTHROPLASTY;  Surgeon: Dru Dumont DO;  Location: [address] [place] [address];  Service: Orthopedics    IN TRANSURETHRAL ELEC-SURG PROSTATECTOM N/A 3/30/2018    Procedure: TRANSURETHRAL RESECTION OF PROSTATE (TURP);   Surgeon: Francois Spatz, MD;  Location: [facility];  Service: Urology    TONSILLECTOMY         Meds/Allergies:  all medications and allergies reviewed    Allergies: No Known Allergies    Social History:     Marital Status: /Civil Union    Substance Use History:   History   Alcohol Use    Yes     Comment: 2 monthly     History   Smoking Status    Former Smoker    Packs/day: 2 00    Years: 34 00    Quit date: 1998   Smokeless Tobacco    Never Used     History   Drug Use No       Family History:  I have reviewed the patients family history    Physical Exam:   Vitals:   Blood Pressure: 136/84 (09/08/18 0841)  Pulse: (!) 125 (09/08/18 0841)  Temperature: (!) 97 1 °F (36 2 °C) (09/08/18 0841)  Temp Source: Tympanic (09/08/18 0841)  Respirations: 16 (09/08/18 0841)  Height: 5' 10" (177 8 cm) (09/07/18 1359)  Weight - Scale: 106 kg (234 lb 3 2 oz) (09/07/18 1359)  SpO2: 99 % (09/08/18 0841)    Physical Exam   Constitutional: He is oriented to person, place, and time  He appears well-developed and well-nourished  HENT:   Head: Normocephalic and atraumatic  Eyes: Conjunctivae and EOM are normal  Right eye exhibits no discharge  Left eye exhibits no discharge  Glasses in place   Neck: Normal range of motion  No tracheal deviation present  Cardiovascular: Regular rhythm and normal heart sounds  Exam reveals no gallop and no friction rub  No murmur heard  tachy   Pulmonary/Chest: Effort normal and breath sounds normal  No respiratory distress  He has no wheezes  He has no rales  Abdominal: Soft  Bowel sounds are normal  He exhibits no distension  There is no tenderness  There is no rebound  Musculoskeletal: Normal range of motion  He exhibits edema (left thigh swelling, patient reports chronic)  He exhibits no tenderness or deformity  B/l knees wrapped   Neurological: He is alert and oriented to person, place, and time  Skin: Skin is warm and dry  No rash noted  No erythema  No pallor  Psychiatric: His behavior is normal  Judgment and thought content normal    Patient irritable   Nursing note and vitals reviewed  Additional Data:   Lab Results: I have personally reviewed pertinent reports          Results from last 7 days  Lab Units 09/08/18  0446   WBC Thousand/uL 10 40   HEMOGLOBIN g/dL 11 3*   HEMATOCRIT % 33 2*   PLATELETS Thousands/uL 158   NEUTROS PCT % 59   LYMPHS PCT % 23   MONOS PCT % 16*   EOS PCT % 3       Results from last 7 days  Lab Units 09/08/18  0446   SODIUM mmol/L 137   POTASSIUM mmol/L 3 7   CHLORIDE mmol/L 102   CO2 mmol/L 28   BUN mg/dL 14   CREATININE mg/dL 0 66*   CALCIUM mg/dL 9 2       ** Please Note: This note has been constructed using a voice recognition system   **

## 2018-09-08 NOTE — PROGRESS NOTES
PT EVALUATION:       09/08/18 0830   Pain Assessment   Pain Score Worst Possible Pain   Pain Type Surgical pain   Pain Location Knee   Pain Orientation Bilateral   Response to Interventions (Nurse aware, medication provided)   Restrictions/Precautions   RLE Weight Bearing Per Order WBAT   LLE Weight Bearing Per Order WBAT   QI: Sit to Stand   Assistance Needed Physical assistance   Assistance Provided by [place] 75% or more   Comment (i person)   Sit to Stand CARE Score 2   Transfer Bed/Chair/Wheelchair   Bed, Chair, Wheelchair Transfer (FIM) 2 - Patient completes 25 - 49% of all tasks   QI: Walk 10 Feet   Assistance Needed Physical assistance   Assistance Provided by [place] 25%-49%   Comment 5 ' with RW with Mod A   Walk 10 Feet CARE Score 3   Ambulation   Does the patient walk? 2  Yes   Primary Discharge Mode of Locomotion Walk   Walk Assist Level Moderate Assist   Gait Pattern Antalgic; Inconsistant [place]; Slow Yomaira;Decreased foot clearance   Assist Device Yary Katina Collado Walked (feet) 5 ft   Walking (FIM) 1 - Patient ambulates less than 49 feet regardless of assist/device/set up   QI: 1 Step (Curb)   Reason if not Attempted Safety concerns   1 Step (Curb) CARE Score 88   QI: 4 Steps   Reason if not Attempted Safety concerns   4 Steps CARE Score 88   QI: 12 Steps   Reason if not Attempted Safety concerns   12 Steps CARE Score 88   Therapeutic Interventions   Strengthening (BLE TE on all planes)   Balance (standing balance training)   Assessment   Treatment Assessment (Patient tolerated treatment fair)   Plan   Treatment/Interventions LE strengthening/ROM; Elevations; Therapeutic exercise; Endurance training;Patient/family training;Equipment eval/education; Bed mobility;Gait training   PT Therapy Minutes   PT Time In 0830   PT Time Out 0930   PT Total Time (minutes) 60   PT Mode of treatment - Individual (minutes) 60       Patient tolerated the treatment well, he appears to be apprehensive with weight bearing activity but with education and encouragement, was able to increase activity participation    Patient will benefit from skilled therapy services to maximize his LOF and return to his prior living setting    R Knee ROM 30 - 60 degrees  L knee ROM  25 - 75 degrees

## 2018-09-08 NOTE — NURSING NOTE
Pt alert and oriented  Slept on and off throughout shift  Pt given percocet as ordered for pain in B/L knees  Discussed with pt need for sleep aides  Pt stated that he does not take anything at home to help him sleep  Pt's B/L knees covered and are CDI  At this time he is resting in bed, call bell in reach

## 2018-09-08 NOTE — PROGRESS NOTES
Progress Note - Jillian Durán 1945, 68 y o  male MRN: [id number]    Unit/Bed#: [facility] 221-01 Encounter: [id number]    Primary Care Provider: Mariano Isbell   Date and time admitted to hospital: 9/7/2018  1:55 PM        * Primary osteoarthritis of both knees   Assessment & Plan    S/P B/L TKA WBAT with pain    PT/OT  Arixtra DVT prophy  Pepcid GI prophy  DC Percocet  Add OxyIR and Lidoderm  Robaxin for spasms  Keflex to complete 5 d  Acute blood loss as cause of postoperative anemia   Assessment & Plan    Post op anemia    FeSO4  Monitor CBC        Gastroesophageal reflux disease without esophagitis   Assessment & Plan    GERD    Pepcid          Vitals:  Temp:  [96 2 °F (35 7 °C)-97 7 °F (36 5 °C)] 97 1 °F (36 2 °C)  HR:  [] 125  Resp:  [16-18] 16  BP: (134-148)/() 136/84    Physical Exam:  General: alert, no apparent distress, cooperative and comfortable  HENMT: Head: Normal, normocephalic, atraumatic  Eye: Normal external eye, conjunctiva, lids   Ears: Normal external ears  Nose: Normal external nose, mucus membranes  COR: [id number]  Pulmonary: chest expansion normal, no retractions, no accessory muscle usage, no wheezes, rales, or rhonchi   Abdomen: soft, nontender, nondistended, no masses or organomegaly  Skin/Extremity: b/l knee incisions intact with staples, mod edema, calves soft NT  Neurologic: Awake alert orientedx3, nonfocal  Psych: normal mood, behavior, speech, dress, and thought processes  Musculoskeletal: limited rom b/l knees  Patient is progressing with ADLs and functional mobility  Mod A for transfers       Current Facility-Administered Medications   Medication Dose Route Frequency Provider Last Rate Last Dose    acetaminophen (TYLENOL) tablet 650 mg  650 mg Oral Q6H PRN Evan Carrero MD        aluminum-magnesium hydroxide-simethicone (MYLANTA) 200-200-20 mg/5 mL oral suspension 30 mL  30 mL Oral Q6H PRN Evan Carrero MD        cephalexin (KEFLEX) capsule 500 mg  500 mg Oral Q8H Christiano Castillo MD   500 mg at 09/08/18 0547    docusate sodium (COLACE) capsule 100 mg  100 mg Oral BID Clare Kaba MD   100 mg at 09/08/18 0820    famotidine (PEPCID) tablet 20 mg  20 mg Oral Daily Clare Kaba MD   20 mg at 09/08/18 0820    ferrous sulfate tablet 325 mg  325 mg Oral BID With Meals Clare Kaba MD   325 mg at 09/08/18 0820    fondaparinux (ARIXTRA) subcutaneous injection 2 5 mg  2 5 mg Subcutaneous Daily Clare Kaba MD   2 5 mg at 09/08/18 0821    lidocaine (LIDODERM) 5 % patch 2 patch  2 patch Transdermal Daily Clare Kaba MD   2 patch at 09/08/18 0826    methocarbamol (ROBAXIN) tablet 500 mg  500 mg Oral Q6H PRN Clare Kaba MD   500 mg at 09/08/18 0547    multivitamin-minerals (CENTRUM) tablet 1 tablet  1 tablet Oral Daily Clare Kaba MD   1 tablet at 09/08/18 0820    oxyCODONE-acetaminophen (PERCOCET) 5-325 mg per tablet 1 tablet  1 tablet Oral Q4H PRN Clare Kaba MD   1 tablet at 09/08/18 0425    oxyCODONE-acetaminophen (PERCOCET) 5-325 mg per tablet 2 tablet  2 tablet Oral Q4H PRN Shante Khan PA-C   2 tablet at 09/08/18 0820        Laboratory:    Lab Results   Component Value Date    HGB 11 3 (L) 09/08/2018    HCT 33 2 (L) 09/08/2018    WBC 10 40 09/08/2018     Lab Results   Component Value Date    BUN 14 09/08/2018     09/08/2018    K 3 7 09/08/2018     09/08/2018    CREATININE 0 66 (L) 09/08/2018     Lab Results   Component Value Date    PROTIME 11 6 08/16/2018    INR 1 00 08/16/2018

## 2018-09-09 LAB
HCT VFR BLD AUTO: 29.7 % (ref 36.5–49.3)
HGB BLD-MCNC: 10.4 G/DL (ref 14–18)

## 2018-09-09 PROCEDURE — 99231 SBSQ HOSP IP/OBS SF/LOW 25: CPT | Performed by: PHYSICIAN ASSISTANT

## 2018-09-09 PROCEDURE — 85014 HEMATOCRIT: CPT | Performed by: PHYSICAL MEDICINE & REHABILITATION

## 2018-09-09 PROCEDURE — 85018 HEMOGLOBIN: CPT | Performed by: PHYSICAL MEDICINE & REHABILITATION

## 2018-09-09 RX ADMIN — CEPHALEXIN 500 MG: 500 CAPSULE ORAL at 05:19

## 2018-09-09 RX ADMIN — CEPHALEXIN 500 MG: 500 CAPSULE ORAL at 21:46

## 2018-09-09 RX ADMIN — Medication 325 MG: at 17:19

## 2018-09-09 RX ADMIN — DOCUSATE SODIUM 100 MG: 100 CAPSULE, LIQUID FILLED ORAL at 17:19

## 2018-09-09 RX ADMIN — CEPHALEXIN 500 MG: 500 CAPSULE ORAL at 13:34

## 2018-09-09 RX ADMIN — OXYCODONE HYDROCHLORIDE 10 MG: 5 TABLET ORAL at 09:26

## 2018-09-09 RX ADMIN — OXYCODONE HYDROCHLORIDE 10 MG: 5 TABLET ORAL at 13:34

## 2018-09-09 RX ADMIN — Medication 1 TABLET: at 09:26

## 2018-09-09 RX ADMIN — OXYCODONE HYDROCHLORIDE 10 MG: 5 TABLET ORAL at 17:19

## 2018-09-09 RX ADMIN — FAMOTIDINE 20 MG: 20 TABLET, FILM COATED ORAL at 09:26

## 2018-09-09 RX ADMIN — METHOCARBAMOL 500 MG: 500 TABLET ORAL at 19:41

## 2018-09-09 RX ADMIN — OXYCODONE HYDROCHLORIDE 10 MG: 5 TABLET ORAL at 04:27

## 2018-09-09 RX ADMIN — DOCUSATE SODIUM 100 MG: 100 CAPSULE, LIQUID FILLED ORAL at 09:26

## 2018-09-09 RX ADMIN — Medication 325 MG: at 09:26

## 2018-09-09 RX ADMIN — LIDOCAINE 2 PATCH: 50 PATCH TOPICAL at 09:24

## 2018-09-09 RX ADMIN — OXYCODONE HYDROCHLORIDE 10 MG: 5 TABLET ORAL at 00:25

## 2018-09-09 RX ADMIN — OXYCODONE HYDROCHLORIDE 10 MG: 5 TABLET ORAL at 21:47

## 2018-09-09 RX ADMIN — FONDAPARINUX SODIUM 2.5 MG: 2.5 INJECTION, SOLUTION SUBCUTANEOUS at 09:26

## 2018-09-09 NOTE — ASSESSMENT & PLAN NOTE
· S/p B/L TKR  · Pain control: oxy ir 10 mod 15mg severe  · Will give 1 dose stat of Morphine 5mg solution  · PT/OT rehab services per Dr Christiana Cornejo

## 2018-09-09 NOTE — PROGRESS NOTES
Progress Note - Rissa Martini 1945, 68 y o  male MRN: [id number]    Unit/Bed#: -01 Encounter: [id number]    Primary Care Provider: Tru Lopez   Date and time admitted to hospital: 2018  1:55 PM        Acute blood loss as cause of postoperative anemia   Assessment & Plan    · Monitor labs periodically        * Primary osteoarthritis of both knees   Assessment & Plan    · S/p B/L TKR  · Pain control: oxy ir 10 mod 15mg severe  · Will give 1 dose stat of Morphine 5mg solution  · PT/OT rehab services per Dr Cal Mcintyre        Gastroesophageal reflux disease without esophagitis   Assessment & Plan    · Pepcid        Class 1 obesity due to excess calories without serious comorbidity with body mass index (BMI) of 33 0 to 33 9 in adult   Assessment & Plan    · BMI 33 6          VTE Pharmacologic Prophylaxis: Pharmacologic: Fondaparinux (Arixtra)    Patient Centered Rounds: I have performed bedside rounds with nursing staff today  Time Spent for Care: 20 minutes  More than 50% of total time spent on counseling and coordination of care as described above  Current Length of Stay: 2 day(s)    Current Patient Status: Inpatient Rehab     Discharge Plan: per ARU    Code Status: Level 1 - Full Code    Subjective:   Patient seen in bed, reports pain fluctuates, currently he is 8/10  He has not had any pain meds since this morning  No cp/sob  More calm today than yesterday  Advised him that he will only get pain medications when he requests and that it is not scheduled  He said he did not realize this and he was assuming they would just give it  Objective:     Vitals:   Temp (24hrs), Av 2 °F (36 2 °C), Min:96 8 °F (36 °C), Max:97 5 °F (36 4 °C)    HR:  [95-98] 95  Resp:  [18] 18  BP: (128-144)/(76-78) 128/76  SpO2:  [92 %] 92 %  Body mass index is 33 6 kg/m²  Input and Output Summary (last 24 hours):      Intake/Output Summary (Last 24 hours) at 18 1226  Last data filed at 09/09/18 1215   Gross per 24 hour   Intake              480 ml   Output              925 ml   Net             -445 ml     Physical Exam:     Physical Exam   Constitutional: He is oriented to person, place, and time  He appears well-developed and well-nourished  HENT:   Head: Normocephalic and atraumatic  Eyes: Conjunctivae and EOM are normal  Right eye exhibits no discharge  Left eye exhibits no discharge  Glasses in place   Neck: Normal range of motion  No tracheal deviation present  Cardiovascular: Normal rate, regular rhythm and normal heart sounds  Exam reveals no gallop and no friction rub  No murmur heard  Pulmonary/Chest: Effort normal  No respiratory distress  Abdominal: Soft  Bowel sounds are normal  He exhibits no distension  There is no tenderness  There is no rebound  Musculoskeletal: Normal range of motion  He exhibits edema  B/l knees wrapped   Neurological: He is alert and oriented to person, place, and time  Skin: Skin is warm and dry  No rash noted  No erythema  No pallor  Psychiatric: He has a normal mood and affect  His behavior is normal  Judgment and thought content normal    Nursing note and vitals reviewed  Additional Data:   Labs:    Results from last 7 days  Lab Units 09/09/18  0521 09/08/18  0446   WBC Thousand/uL  --  10 40   HEMOGLOBIN g/dL 10 4* 11 3*   HEMATOCRIT % 29 7* 33 2*   PLATELETS Thousands/uL  --  158   NEUTROS PCT %  --  59   LYMPHS PCT %  --  23   MONOS PCT %  --  16*   EOS PCT %  --  3       Results from last 7 days  Lab Units 09/08/18  0446   SODIUM mmol/L 137   POTASSIUM mmol/L 3 7   CHLORIDE mmol/L 102   CO2 mmol/L 28   BUN mg/dL 14   CREATININE mg/dL 0 66*   CALCIUM mg/dL 9 2     * I Have Reviewed All Lab Data Listed Above  * Additional Pertinent Lab Tests Reviewed:  All [facility] Admission  Reviewed    Last 24 Hours Medication List:     Current Facility-Administered Medications:  acetaminophen 650 mg Oral Q6H PRN Sioban MD Mg   aluminum-magnesium hydroxide-simethicone 30 mL Oral Q6H PRN Ethel Hernandez MD   cephalexin 500 mg Oral Q8H Nikki Gee MD   docusate sodium 100 mg Oral BID Ethel Hernandez MD   famotidine 20 mg Oral Daily Ethel Hernandez MD   ferrous sulfate 325 mg Oral BID With Arcenio Goins MD   fondaparinux 2 5 mg Subcutaneous Daily Ethel Hernandez MD   lidocaine 2 patch Transdermal Daily Ethel Hernandez MD   methocarbamol 500 mg Oral Q6H PRN Ethel Hernandez MD   multivitamin-minerals 1 tablet Oral Daily Ethel Hernandez MD   oxyCODONE 10 mg Oral Q4H PRN Ethel Hernandez MD   oxyCODONE 15 mg Oral Q4H PRN Ethel Hernandez MD        Today, Patient Was Seen By: Lesli Boswell PA-C    ** Please Note: Dictation voice to text software may have been used in the creation of this document   **

## 2018-09-09 NOTE — NURSING NOTE
Patients pain controlled today and PRN oxy given as ordered  Patient has no numbness or tingling in bilateral legs  Ace wraps maintained and incision clean, dry, intact with lidoderm patch on both sides  Compliant with SCD's and patient moving legs and attempting to bend knees while in bed  Will continue to monitor and follow plan of care

## 2018-09-09 NOTE — NURSING NOTE
Pt received prn oxy 10 mg at midnight and at 0427 for pain in B/L knees  Pt does state that the pain medication is effective  Pt also uses ice packs for relief

## 2018-09-10 LAB
HCT VFR BLD AUTO: 30.1 % (ref 36.5–49.3)
HGB BLD-MCNC: 10.3 G/DL (ref 14–18)

## 2018-09-10 PROCEDURE — 85018 HEMOGLOBIN: CPT | Performed by: PHYSICAL MEDICINE & REHABILITATION

## 2018-09-10 PROCEDURE — 85014 HEMATOCRIT: CPT | Performed by: PHYSICAL MEDICINE & REHABILITATION

## 2018-09-10 PROCEDURE — 97535 SELF CARE MNGMENT TRAINING: CPT

## 2018-09-10 PROCEDURE — 97110 THERAPEUTIC EXERCISES: CPT

## 2018-09-10 PROCEDURE — 97116 GAIT TRAINING THERAPY: CPT

## 2018-09-10 PROCEDURE — 97530 THERAPEUTIC ACTIVITIES: CPT

## 2018-09-10 RX ADMIN — OXYCODONE HYDROCHLORIDE 10 MG: 5 TABLET ORAL at 05:08

## 2018-09-10 RX ADMIN — CEPHALEXIN 500 MG: 500 CAPSULE ORAL at 14:22

## 2018-09-10 RX ADMIN — Medication 1 TABLET: at 08:10

## 2018-09-10 RX ADMIN — FONDAPARINUX SODIUM 2.5 MG: 2.5 INJECTION, SOLUTION SUBCUTANEOUS at 08:10

## 2018-09-10 RX ADMIN — CEPHALEXIN 500 MG: 500 CAPSULE ORAL at 05:07

## 2018-09-10 RX ADMIN — DOCUSATE SODIUM 100 MG: 100 CAPSULE, LIQUID FILLED ORAL at 17:47

## 2018-09-10 RX ADMIN — Medication 325 MG: at 07:49

## 2018-09-10 RX ADMIN — FAMOTIDINE 20 MG: 20 TABLET, FILM COATED ORAL at 08:10

## 2018-09-10 RX ADMIN — LIDOCAINE 2 PATCH: 50 PATCH TOPICAL at 08:12

## 2018-09-10 RX ADMIN — OXYCODONE HYDROCHLORIDE 10 MG: 5 TABLET ORAL at 09:55

## 2018-09-10 RX ADMIN — METHOCARBAMOL 500 MG: 500 TABLET ORAL at 15:31

## 2018-09-10 RX ADMIN — OXYCODONE HYDROCHLORIDE 15 MG: 5 TABLET ORAL at 19:51

## 2018-09-10 RX ADMIN — Medication 325 MG: at 16:34

## 2018-09-10 RX ADMIN — CEPHALEXIN 500 MG: 500 CAPSULE ORAL at 21:33

## 2018-09-10 RX ADMIN — OXYCODONE HYDROCHLORIDE 15 MG: 5 TABLET ORAL at 14:22

## 2018-09-10 RX ADMIN — DOCUSATE SODIUM 100 MG: 100 CAPSULE, LIQUID FILLED ORAL at 08:10

## 2018-09-10 NOTE — PCC PHYSICAL THERAPY
9/10/18: Pt participating in therapy and progressing towards PT goals; Pt is CGA for transfers and bed mobility; Pt amb up to 9' with RW and CGA; Pt is limited by pain and decreased ROM in B knees;  Pt would benefit from continued skilled PT to increase ROM, strength, and independence with mobility for a safe D/C home

## 2018-09-10 NOTE — CASE MANAGEMENT
Initial assessment & orientation to [facility]  Discussed [address] with Pt & left message for Pt's wife  Pt and his spouse reside in a mobile home with 3 steps in  Pt was completely independent PTA for double knee replacement  Retired  & worked 3rd shift, thus his sleeping patterns remain affected  Also served in [employer]  Pt reports that he has a strong support system with his children & grandchildren  SW will continue to monitor & assist as needed with [address]

## 2018-09-10 NOTE — PROGRESS NOTES
09/10/18 1231   Pain Assessment   Pain Assessment 0-10   Pain Score Worst Possible Pain   Pain Location Knee  (worst pain in R knee)   Pain Orientation Bilateral   Restrictions/Precautions   Precautions Fall Risk   Weight Bearing Restrictions Yes   RLE Weight Bearing Per Order WBAT   LLE Weight Bearing Per Order WBAT   ROM Restrictions No   Dressing/Undressing Clothing   Don LB Clothes Pants;Socks   Limitations Noted In Coordination; Safety;Strength;ROM   Adaptive Equipment Reacher;Dressing Stick; Sock Aide   Positioning In Bed   Findings assist to get pants over heel of foot on bilat legs   LB Dressing (FIM) 4 - Patient completes 75% of all tasks   Transfer Bed/Chair/Wheelchair   Limitations Noted In Balance;Confidence; Endurance;Pain Management;LE Strength   Adaptive Equipment Roller Walker   Sit to Stand Moderate Assist   Stand to Sit Moderate Assist   Supine to Sit Supervision   Sit to Supine Moderate Assist   Bed, Chair, Wheelchair Transfer (FIM) 3 - Patient completes 50 - 74% of all tasks   Functional Standing Tolerance   Time approx 1 5 minutes one trial, 45 seconds second trial   Activity sorted cards into respective pockets   Transfers   Sit to Stand 3  Moderate assistance   Stand to Sit 3  Moderate assistance   Therapeutic Exercise - ROM   UE-ROM Yes   ROM- Right Upper Extremities   RUE ROM Comment built pipe constructions using picture as guide while seated   ROM - Left Upper Extremities    LUE ROM Comment built pipe construction using picture as guide while seated   Cognition   Overall Cognitive Status WFL   Arousal/Participation Alert; Responsive; Cooperative   Attention Within functional limits   Orientation Level Oriented X4   Memory Within functional limits   Following Commands Follows all commands and directions without difficulty   Activity Tolerance   Activity Tolerance Patient limited by pain   Assessment   Treatment Assessment Pt agreeable to OT session this PM  Received lying supine in bed  Reported severe pain in bilat knees, especially R knee  Practiced LB dressing with AE: sock aide, dressing stick, reacher  Pt performed activites in bed and demonstrated good carryover of the knowledge  [place] to get feet completely through pant legs  Transferred to w/c; Soraida Rodriguez transfers  Requested to elevate legs in legrests in w/c, however nurse's aide entered room and discussed pros and cons of elevating feet with pt  Pt agreed to keep knees bent while in therapy  Standing tolerance activity: stood and placed playing cards in respective pockets; one trial 1 5 minutes, second trial 45 seconds  Both trials pt requested to sit down 2* knee pain  Used RW for support during task  While seated, built pipe constructions using pictures as guide  Pt would benefit from continued skilled OT services in order to maximize independence in self care, activity tolerance, standing tolerance, and UE strength/ROM  Prognosis Good   Problem List Decreased strength;Decreased range of motion;Decreased endurance; Impaired balance;Decreased mobility; Decreased safety awareness;Orthopedic restrictions;Pain   Plan   Treatment/Interventions ADL retraining;Functional transfer training; Endurance training; Therapeutic exercise;Patient/family training;Equipment eval/education; Compensatory technique education;OT   Progress Progressing toward goals   Therapy Time missed   Time missed? No     Pt seen for 16 concurrent minutes and 44 individual minutes during this session

## 2018-09-10 NOTE — ASSESSMENT & PLAN NOTE
S/P B/L TKA WBAT with pain    PT/OT  Arixtra DVT prophy  Pepcid GI prophy   OxyIR and Lidoderm  Robaxin for spasms  Keflex to complete 5 d

## 2018-09-10 NOTE — PROGRESS NOTES
09/10/18 1000   Pain Assessment   Pain Assessment 0-10   Pain Score 8  (later reported 15 for R knee)   Pain Location Knee   Pain Orientation Bilateral   Restrictions/Precautions   Precautions Fall Risk   Weight Bearing Restrictions Yes   RLE Weight Bearing Per Order WBAT   LLE Weight Bearing Per Order WBAT   Grooming   Able To Initiate Tasks; Acquire Items; Wash/Dry Face;Brush/Clean Teeth;Wash/Dry Hands   Limitation Noted In Safety;Strength   Grooming (FIM) 5 - Patient requires supervision/monitoring   Bathing   Assessed Bath Style Sponge Bath   Anticipated D/C Bath Style Shower   Able to Mobileum No   Able to Wash/Rinse/Dry (body part) Left Arm;Right Arm; Chest;Abdomen  (Pt did not want to wash lower body)   Limitations Noted in Balance; Safety;Strength   Positioning Seated   Bathing (FIM) 5 - Patient requires supervision/monitoring but completes 10/10 parts   Dressing/Undressing Clothing   Remove UB Clothes Pullover Shirt   Don UB Clothes Pullover Shirt   Limitations Noted In Safety;Strength   Positioning Supported Sit   UB Dressing (FIM) 5 - Patient requires supervision/monitoring   Transfer Bed/Chair/Wheelchair   Limitations Noted In Balance;Confidence; Endurance;Pain Management;LE Strength   Adaptive Equipment Roller Walker   Stand Pivot Moderate Assist   Sit to Stand Moderate Assist   Stand to Sit Moderate Assist   Supine to Sit Supervision   Sit to Supine Moderate Assist   Bed, Chair, Wheelchair Transfer (FIM) 3 - Patient completes 50 - 74% of all tasks   Transfers   Sit to Stand 3  Moderate assistance   Stand to Sit 3  Moderate assistance   Stand pivot 3  Moderate assistance   Therapeutic Exercise - ROM   UE-ROM Yes   ROM- Right Upper Extremities   RUE ROM Comment arm bike 2 5-minute trials   ROM - Left Upper Extremities    LUE ROM Comment arm bike 2 5-minute trials   Therapeutic Excerise-Strength   UE Strength Yes   Right Upper Extremity- Strength   RUE Strength Comment pushed weighted wooden box 2x20 all planes of motion   Left Upper Extremity-Strength   LUE Strength Comment pushed weighed wooden box 2x20 all planes of motion   Cognition   Overall Cognitive Status WFL   Arousal/Participation Alert; Responsive   Attention Within functional limits   Orientation Level Oriented X4   Memory Within functional limits   Following Commands Follows one step commands without difficulty   Additional Activities   Additional Activities Comments able to self propel in w/c   Activity Tolerance   Activity Tolerance Patient limited by pain   Assessment   Treatment Assessment Pt agreeable to OT session this AM  Received lying supine in bed  Supervision supine to sit, modA all other transfers using RW for support  Min verbal cues for safety regarding where to place hands when pushing up  Pt reported severe pain in R knee after bed to w/c transfer  Sponge bathed upper body; declined to wash lower body  Brushed teeth and washed face  Overall supervision ADL activities  UE strengthening/activity tolerance: arm bike 2 5-minute trials with rest break in between, pushed weighted wooden box 2x20 all planes of motion  Discussed AE use for dressing; pt familiar with devices but did not own any at the time of the session  OT acquired AE for pt with intent to practice LB dressing once R knee pain subsided  Attempt to engage in activity to practice using reacher in which pt would retrieve wooden pegs from floor with reacher, however pt reported he felt "insulted" by this activity and that he is "not a child," so activity abandoned  Pt requested to go back to room at that time and wanted to unwrap his R knee bandages  After asking pt's nurse, OT unwrapped bilat compression bandages  Pt left lying supine in bed with plans to make up the remainder of the OT session that afternoon  Pt would benefit from continued skilled OT services in order to maximize independence in self care, activity tolerance, and UE strength     Prognosis Good   Problem List Decreased strength;Decreased range of motion;Decreased endurance; Impaired balance;Decreased mobility;Orthopedic restrictions;Pain;Decreased safety awareness   Plan   Treatment/Interventions ADL retraining;Functional transfer training; Therapeutic exercise; Endurance training;Equipment eval/education;Patient/family training; Compensatory technique education;OT   Progress Progressing toward goals   OT Therapy Minutes   OT Time In 1000   OT Time Out 1107   OT Total Time (minutes) 67   OT Mode of treatment - Individual (minutes) 67   Therapy Time missed   Time missed? No     Pt with 35 minutes of concurrent therapy and 32 minutes of individual therapy, NOT 67 minutes of individual therapy

## 2018-09-10 NOTE — PROGRESS NOTES
09/10/18 1430   Pain Assessment   Pain Assessment 0-10   Pain Score Worst Possible Pain   Pain Type Surgical pain   Pain Location Knee   Pain Orientation Bilateral   Restrictions/Precautions   Precautions Fall Risk   Weight Bearing Restrictions Yes   RUE Weight Bearing Per Order WBAT   LUE Weight Bearing Per Order WBAT   RLE Weight Bearing Per Order WBAT   LLE Weight Bearing Per Order WBAT   ROM Restrictions No   Cognition   Overall Cognitive Status WFL   Arousal/Participation Alert; Responsive; Cooperative   Attention Within functional limits   Orientation Level Oriented X4   Memory Within functional limits   Following Commands Follows one step commands without difficulty   Subjective   Subjective I am hurting right now from the other therapy   Transfer Bed/Chair/Wheelchair   Limitations Noted In Balance; Coordination; Endurance;Pain Management;LE Strength   Adaptive Equipment Roller Walker   Stand Pivot Contact Guard   Sit to [employer]   Stand to [facility]   Supine to [facility]   Sit to Supine Contact Guard   All Transfer Contact [employer], Chair, Wheelchair Transfer (FIM) 4 - Patient requires steadying assist or light touching   Ambulation   Primary Discharge Mode of Locomotion Walk   Walk Assist Level Contact Guard   Gait Pattern Antalgic; Inconsistant Yomaira; Slow Yomaira;Decreased foot clearance   Assist Device Roller Walker   Distance Walked (feet) 9 ft  (Pt amb 7'x2 and 9' with RW)   Limitations Noted In Balance; Endurance;Strength   Findings level   Walking (FIM) 1 - Patient ambulates less than 49 feet regardless of assist/device/set up   Therapeutic Interventions   Strengthening seated and supine ther ex 30 reps   Other gait and transfer training   Assessment   Treatment Assessment Pt completed seated and supine ther ex to increase ROM and strength; Pt needs rests due to limited layo for exercise secondary to weakness and pain; limited ROM in B knees;  Pt  decreased assistance for transfers today to [id number]; Pt amb up to 9' with RW today and CGA; Pt needs cues for tech and encouragemnet to amb    PT Barriers   Physical Impairment Decreased strength;Decreased range of motion;Decreased endurance; Impaired balance;Decreased mobility   Functional Limitation Walking;Transfers;Standing   Plan   Treatment/Interventions Functional transfer training;LE strengthening/ROM; Therapeutic exercise;Gait training   Progress Slow progress, decreased activity tolerance   PT Therapy Minutes   PT Time In 1430   PT Time Out 1530   PT Total Time (minutes) 60   PT Mode of treatment - Individual (minutes) 30   PT Mode of treatment - Concurrent (minutes) 30   PT Mode of treatment - Group (minutes) 0   PT Mode of treatment - Co-treat (minutes) 0   PT Mode of Teatment - Total time(minutes) 60 minutes   Therapy Time missed   Time missed?  No

## 2018-09-10 NOTE — PROGRESS NOTES
Progress Note - Norman Sheppard 1945, 68 y o  male MRN: [id number]    Unit/Bed#: [facility] 221-01 Encounter: [id number]    Primary Care Provider: Joceline Rolle   Date and time admitted to hospital: 9/7/2018  1:55 PM        * Primary osteoarthritis of both knees   Assessment & Plan    S/P B/L TKA WBAT with pain    PT/OT  Arixtra DVT prophy  Pepcid GI prophy   OxyIR and Lidoderm  Robaxin for spasms  Keflex to complete 5 d  Acute blood loss as cause of postoperative anemia   Assessment & Plan    Post op anemia    FeSO4  Monitor CBC        Gastroesophageal reflux disease without esophagitis   Assessment & Plan    GERD    Pepcid          Vitals:  Temp:  [96 5 °F (35 8 °C)-97 8 °F (36 6 °C)] 96 5 °F (35 8 °C)  HR:  [101-102] 101  Resp:  [16-18] 16  BP: (138-148)/() 140/80    Physical Exam:  General: alert, no apparent distress, cooperative and comfortable  HENMT: Head: Normal, normocephalic, atraumatic  Eye: Normal external eye, conjunctiva, lids   Ears: Normal external ears  Nose: Normal external nose, mucus membranes  COR: [id number]  Pulmonary: chest expansion normal, no retractions, no accessory muscle usage, no wheezes, rales, or rhonchi   Abdomen: soft, nontender, nondistended, no masses or organomegaly  Skin/Extremity: b/l knee incisions intact with staples, mod edema, calves soft NT  Neurologic: Awake alert orientedx3, nonfocal  Patient is progressing with ADLs and functional mobility  Transfers       Current Facility-Administered Medications   Medication Dose Route Frequency Provider Last Rate Last Dose    acetaminophen (TYLENOL) tablet 650 mg  650 mg Oral Q6H PRN Jenny Angulo MD        aluminum-magnesium hydroxide-simethicone (MYLANTA) 200-200-20 mg/5 mL oral suspension 30 mL  30 mL Oral Q6H PRN Jenny Angulo MD        cephalexin (KEFLEX) capsule 500 mg  500 mg Oral Q8H Remy Maharaj MD   500 mg at 09/10/18 1422    docusate sodium (COLACE) capsule 100 mg  100 mg Oral BID Evan Purchase, MD   100 mg at 09/10/18 0810    famotidine (PEPCID) tablet 20 mg  20 mg Oral Daily Evan Purchase, MD   20 mg at 09/10/18 0810    ferrous sulfate tablet 325 mg  325 mg Oral BID With Meals Evan Purchase, MD   325 mg at 09/10/18 0749    fondaparinux (ARIXTRA) subcutaneous injection 2 5 mg  2 5 mg Subcutaneous Daily Evan Purchase, MD   2 5 mg at 09/10/18 0810    lidocaine (LIDODERM) 5 % patch 2 patch  2 patch Transdermal Daily Evan Purchase, MD   2 patch at 09/10/18 0812    methocarbamol (ROBAXIN) tablet 500 mg  500 mg Oral Q6H PRN Evan Purchase, MD   500 mg at 09/10/18 1531    multivitamin-minerals (CENTRUM) tablet 1 tablet  1 tablet Oral Daily Evan Purchase, MD   1 tablet at 09/10/18 0810    oxyCODONE (ROXICODONE) IR tablet 10 mg  10 mg Oral Q4H PRN Evan Purchase, MD   10 mg at 09/10/18 0955    oxyCODONE (ROXICODONE) IR tablet 15 mg  15 mg Oral Q4H PRN Evan Purchase, MD   15 mg at 09/10/18 1422        Laboratory:    Lab Results   Component Value Date    HGB 10 3 (L) 09/10/2018    HCT 30 1 (L) 09/10/2018    WBC 10 40 09/08/2018     Lab Results   Component Value Date    BUN 14 09/08/2018     09/08/2018    K 3 7 09/08/2018     09/08/2018    CREATININE 0 66 (L) 09/08/2018     Lab Results   Component Value Date    PROTIME 11 6 08/16/2018    INR 1 00 08/16/2018

## 2018-09-10 NOTE — PROGRESS NOTES
Dressings removed this a m , lidoderm patches to both knees lateral and medial to incision  Staples clean , dry and intact  Telfa and ace dressing then applied to both knees  Non pitting edema of both knees, left knee more swollen  States of more dificulty lifting right leg than left leg  Ace dressings removed after therapy due to discomfort  Telfa DDI to both knees  NV check stable   See pain assessment for prn pain meds given  Safety and medication teaching reviewed with pt  Tolerating therapy

## 2018-09-10 NOTE — PCC OCCUPATIONAL THERAPY
9/11/18: Pt's current LOF listed above  Barriers include bilat knee pain, decreased mobility, decreased strength/ROM in bilat LE's, decreased activity tolerance, decreased independence in self care, and decreased safety awareness  Pt participating in ADL training, therapeutic activities/exercises, functional transfers, activity tolerance, and AE training in order to meet goals  Pt would benefit from continued skilled OT services in order to address listed barriers and prepare for safe d/c home with spouse support

## 2018-09-10 NOTE — PROGRESS NOTES
At start of shift pt was awake and alert, pleasant, medicated for c/o B/L knee pain, presently resting quietly in his bed with eyes closed, no distress seen, call bell within easy reach, B/L knee incisions have intact staples, no drainage, no redness, no swelling, ace wraps and non-stick dressing applied

## 2018-09-11 PROCEDURE — 97110 THERAPEUTIC EXERCISES: CPT

## 2018-09-11 PROCEDURE — 97116 GAIT TRAINING THERAPY: CPT

## 2018-09-11 PROCEDURE — 97530 THERAPEUTIC ACTIVITIES: CPT

## 2018-09-11 PROCEDURE — 97535 SELF CARE MNGMENT TRAINING: CPT

## 2018-09-11 RX ORDER — POLYETHYLENE GLYCOL 3350 17 G/17G
17 POWDER, FOR SOLUTION ORAL DAILY
Status: DISCONTINUED | OUTPATIENT
Start: 2018-09-11 | End: 2018-09-17 | Stop reason: HOSPADM

## 2018-09-11 RX ORDER — BISACODYL 10 MG
10 SUPPOSITORY, RECTAL RECTAL DAILY PRN
Status: DISCONTINUED | OUTPATIENT
Start: 2018-09-11 | End: 2018-09-17 | Stop reason: HOSPADM

## 2018-09-11 RX ORDER — SENNOSIDES 8.6 MG
1 TABLET ORAL
Status: DISCONTINUED | OUTPATIENT
Start: 2018-09-11 | End: 2018-09-17 | Stop reason: HOSPADM

## 2018-09-11 RX ADMIN — LIDOCAINE 2 PATCH: 50 PATCH TOPICAL at 09:15

## 2018-09-11 RX ADMIN — FAMOTIDINE 20 MG: 20 TABLET, FILM COATED ORAL at 09:16

## 2018-09-11 RX ADMIN — DOCUSATE SODIUM 100 MG: 100 CAPSULE, LIQUID FILLED ORAL at 09:15

## 2018-09-11 RX ADMIN — CEPHALEXIN 500 MG: 500 CAPSULE ORAL at 14:58

## 2018-09-11 RX ADMIN — CEPHALEXIN 500 MG: 500 CAPSULE ORAL at 05:32

## 2018-09-11 RX ADMIN — METHOCARBAMOL 500 MG: 500 TABLET ORAL at 10:09

## 2018-09-11 RX ADMIN — ACETAMINOPHEN 650 MG: 325 TABLET ORAL at 08:01

## 2018-09-11 RX ADMIN — CEPHALEXIN 500 MG: 500 CAPSULE ORAL at 22:20

## 2018-09-11 RX ADMIN — FONDAPARINUX SODIUM 2.5 MG: 2.5 INJECTION, SOLUTION SUBCUTANEOUS at 09:16

## 2018-09-11 RX ADMIN — Medication 1 TABLET: at 09:16

## 2018-09-11 RX ADMIN — Medication 325 MG: at 17:37

## 2018-09-11 RX ADMIN — Medication 325 MG: at 07:59

## 2018-09-11 RX ADMIN — POLYETHYLENE GLYCOL 3350 17 G: 17 POWDER, FOR SOLUTION ORAL at 09:16

## 2018-09-11 RX ADMIN — OXYCODONE HYDROCHLORIDE 15 MG: 5 TABLET ORAL at 20:39

## 2018-09-11 RX ADMIN — DOCUSATE SODIUM 100 MG: 100 CAPSULE, LIQUID FILLED ORAL at 17:37

## 2018-09-11 RX ADMIN — SENNOSIDES 8.6 MG: 8.6 TABLET, FILM COATED ORAL at 22:20

## 2018-09-11 RX ADMIN — OXYCODONE HYDROCHLORIDE 15 MG: 5 TABLET ORAL at 05:34

## 2018-09-11 RX ADMIN — OXYCODONE HYDROCHLORIDE 15 MG: 5 TABLET ORAL at 10:06

## 2018-09-11 NOTE — ASSESSMENT & PLAN NOTE
S/P B/L TKA WBAT with pain    PT/OT  Arixtra DVT prophy  Pepcid GI prophy   OxyIR and Lidoderm  Robaxin for spasms  Keflex to complete 5 d today

## 2018-09-11 NOTE — PROGRESS NOTES
09/11/18 1232   Pain Assessment   Pain Assessment 0-10   Pain Score 9   Pain Location Knee   Pain Orientation Bilateral   Restrictions/Precautions   Precautions Fall Risk   Weight Bearing Restrictions Yes   RLE Weight Bearing Per Order WBAT   LLE Weight Bearing Per Order WBAT   ROM Restrictions No   Transfer Bed/Chair/Wheelchair   Limitations Noted In Balance; Endurance;Pain Management;Problem Solving;LE Strength   Adaptive Equipment Roller Walker   Stand Pivot Minimal Assist   Sit to Stand Minimal   Stand to Sit Minimal   Supine to Sit Supervision   Sit to Supine Minimal   Bed, Chair, Wheelchair Transfer (FIM) 4 - Patient completes 75% of all tasks   Toileting   Able to Pull Clothing down yes, up no  Manage Hygiene Bladder   Limitations Noted In Balance;ROM;Safety;LE Strength   Adaptive Equipment Grab Bar   Toileting (FIM) 4 - Patient completes 75% of all tasks   Toilet Transfer   Surface Assessed Standard Commode   Transfer Technique Stand Pivot   Limitations Noted In Balance; Endurance;ROM;Safety;LE Strength   Adaptive Equipment Grab Bar   Toilet Transfer (FIM) 4 - Patient completes 75% of all tasks  (plus verbal cues for safety)   Kitchen Mobility   Kitchen-Mobility Level Wheelchair   Kitchen Activity Retrieve items   Kitchen Mobility Comments used reacher to retrieve boxes/containers from [employer], shelves, and fridge   Transfers   Sit to Stand 4  Minimal assistance   Stand to Sit 4  Minimal assistance   Stand pivot 4  Minimal assistance   Toilet transfer 4  Minimal assistance   Therapeutic Exercise - ROM   UE-ROM Yes   ROM- Right Upper Extremities   RUE ROM Comment pipe construction using picture as guide, put food containers back on shelves   ROM - Left Upper Extremities    LUE ROM Comment pipe construction using picture as guide, put food containers back on shelves   Cognition   Overall Cognitive Status WFL   Arousal/Participation Alert; Responsive; Cooperative   Attention Within functional limits Orientation Level Oriented X4   Memory Within functional limits   Following Commands Follows one step commands without difficulty   Additional Activities   Additional Activities Comments propelled self in w/c to and from OT room   Activity Tolerance   Activity Tolerance Patient limited by pain   Assessment   Treatment Assessment Pt agreeable to OT session this PM  Received lying supine in bed with friend visiting  Supervision supine to sit, Sergio all other tranfers  Requested to use bathroom; Sergio toileting and transfer with assist to pull pants up and verbal cues for safety  Propelled w/c using arms to OT room  Built pipe construction using picture as guide while seated at table; completed two constructions with no errors  Required rest breaks throughout to coold own as pt was very warm  Kitchen mobility/reacher practice: used w/c to maneuver through kitchen and retrieve boxes/food containers  Used reacher to retrieve containers from cabinets or far back on counter  Put containers back in original place when done; attempt to stand but not enough room to do so with w/c and walker in same space  Propelled self back to room, returned to bed  Pt would benefit from continued skilled OT services in order to maximize independence in self care, functional transfers, activity/standing tolerance, and UE/LE ROM/strength  Prognosis Good   Problem List Decreased strength;Decreased range of motion;Decreased endurance; Impaired balance;Decreased mobility; Decreased safety awareness;Orthopedic restrictions;Pain   Plan   Treatment/Interventions ADL retraining;Functional transfer training;LE strengthening/ROM; Therapeutic exercise; Endurance training;Patient/family training;Equipment eval/education; Compensatory technique education;OT   Progress Progressing toward goals   OT Therapy Minutes   OT Time In 1232   OT Time Out 1332   OT Total Time (minutes) 60   OT Mode of treatment - Individual (minutes) 60   Therapy Time missed   Time missed?  No

## 2018-09-11 NOTE — TEAM CONFERENCE
Acute RehabilitationTeam Conference Note  Date: 9/11/2018   Time: 11:36 AM       Patient Name:  Kevyn Marti       Medical Record Number: [id number]   YOB: 1945  Sex: Male          Room/Bed:  [facility] 221/[facility] 221-01  Payor Info:  Payor: Bud Saliva / Plan: MEDICARE A AND B / Product Type: Medicare A & B Fee for Service /      Admitting Diagnosis: S/P knee replacement [Z96 659]   Admit Date/Time:  9/7/2018  1:55 PM  Admission Comments: No comment available     Primary Diagnosis:  Primary osteoarthritis of both knees  Principal Problem: Primary osteoarthritis of both knees    Patient Active Problem List    Diagnosis Date Noted    Acute blood loss as cause of postoperative anemia 09/06/2018    Primary osteoarthritis of both knees 09/05/2018    Class 1 obesity due to excess calories without serious comorbidity with body mass index (BMI) of 33 0 to 33 9 in adult 09/05/2018    Gastroesophageal reflux disease without esophagitis 09/05/2018       Physical Therapy:    Weight Bearing Status: Full Weight Bearing  Transfers: Contact Guard  Bed Mobility: Contact Guard  Amulation Distance (ft): 9 feet  Ambulation: Contact Guard  Assistive Device for Ambulation: Roller Walker  Discharge Recommendations: Home with:  [address] [facility] Yesy Guerrabjia with[de-identified] Family Support, First Floor Setup, Home Physical Therapy    9/10/18: Pt participating in therapy and progressing towards PT goals; Pt is CGA for transfers and bed mobility; Pt amb up to 9' with RW and CGA; Pt is limited by pain and decreased ROM in B knees; Pt would benefit from continued skilled PT to increase ROM, strength, and independence with mobility for a safe D/C home    Occupational Therapy:  Eating: Modified Independent  Grooming: Supervision  Bathing: Supervision  Bathing: Supervision  Upper Body Dressing: Supervision  Lower Body Dressing: Minimal Assistance  Toileting: Supervision  Tub/Shower Transfer:  (must assess)  Toilet Transfer:  Moderate Assistance  Cognition: Within Defined Limits  Orientation: Person, Place, Time, Situation  Discharge Recommendations: Home with:  [address] with[de-identified] Family Support       9/11/18: Pt's current LOF listed above  Barriers include bilat knee pain, decreased mobility, decreased strength/ROM in bilat LE's, decreased activity tolerance, decreased independence in self care, and decreased safety awareness  Pt participating in ADL training, therapeutic activities/exercises, functional transfers, activity tolerance, and AE training in order to meet goals  Pt would benefit from continued skilled OT services in order to address listed barriers and prepare for safe d/c home with spouse support  Speech Therapy:           No notes on file    Nursing Notes:  Appetite: Good  Diet Type: Regular/House                      Diet Patient/Family Education Complete: No    Type of Wound (LDA): Incision           Incision 03/30/18 Penis Other (Comment) (Active)       Incision 09/05/18 Knee Right;Left (Active)   Incision Description Clean 9/10/2018 11:00 PM   Marcia-wound Assessment Intact 9/10/2018 11:00 PM   Drainage Amount None 9/10/2018 11:00 PM   Drainage Description Serosanguineous 9/8/2018  8:20 AM   Dressing Open to air 9/10/2018 11:00 PM   Dressing Changed New dressing applied 9/8/2018  8:20 AM   Patient Tolerance Tolerated well 9/8/2018  8:20 AM   Dressing Status Clean;Dry; Intact 9/8/2018  7:52 PM           Type of Wound Patient/Family Education: Yes  Bladder: 5 - Supervision     Bladder Patient/Family Education: Yes  Bowel: 6 - Modified Douglas     Bowel Patient/Family Education: Yes  Pain Location: Neck, Knee, Leg  Pain Orientation: Bilateral  Pain Score: 10                       Hospital Pain Intervention(s): Medication (See MAR), Heat applied, Repositioned  Pain Patient/Family Education: Yes  Medication Management/Safety  Injectable: Arixtra  Safe Administration: No  Medication Patient/Family Education Complete: Yes    9/10/18 AOx3 , able to make needs known, pain controlled with current regiment  Staples intact bilateral knees, no S/S of infection , BRODERICK  Uses urinal at HS , staff empties  Wears SCD'S while in bed  Lungs clear  Trace edema LLE  Remained in bed this shift  Case Management:     Discharge Planning  Goal Length of Stay: 7  Living Arrangements: Spouse/significant other  Support Systems: Spouse/significant other  Assistance Needed: yes  Type of Current Residence: Private residence  Current Home Care Services: No  No notes on file    Is the patient actively participating in therapies? yes  List any modifications to the treatment plan: na    Barriers Interventions   Decreased ROM Therapeutic exercise, therapeutic activity   pain Medication management   Decreased strength Therapeutic exercise, therapeutic activity   Decreased balance/ safety Cues, ADL, transfer, gait training         Is the patient making expected progress toward goals? yes  List any update or changes to goals: na    Medical Goals: Patient will be medically stable for discharge to [facility] upon completion of rehab program    Weekly Team Goals:   Rehab Team Goals  ADL Team Goal: Patient will return to premorbid level for ADLs upon completion of rehab program  Bowel/Bladder Team Goal: Patient will return to premorbid level for bladder/bowel management upon completion of rehab program  Transfer Team Goal: Patient will be independent with transfers with least restrictive device upon completion of rehab program  Locomotion Team Goal: Patient will be independent with locomotion with least restrictive device upon completion of rehab program  Cognitive Team Goal: Patient will return to premorbid level of cognitive activity upon completion of rehab program     Mod I self care, transfers, mobility    Discussion: Plan for home with wife    Team recommending outpatient PT and OT    Anticipated Discharge Date:  Sept 17, 2018 after therapy

## 2018-09-11 NOTE — PLAN OF CARE
INFECTION - ADULT     Absence of fever/infection during neutropenic period Completed        N/A  DISCHARGE PLANNING     Discharge to home or other facility with appropriate resources Progressing        INFECTION - ADULT     Absence or prevention of progression during hospitalization Progressing        PAIN - ADULT     Verbalizes/displays adequate comfort level or baseline comfort level Progressing        Potential for Falls     Patient will remain free of falls Progressing        Prexisting or High Potential for Compromised Skin Integrity     Skin integrity is maintained or improved Progressing        SAFETY ADULT     Maintain or return to baseline ADL function Progressing     Maintain or return mobility status to optimal level Progressing

## 2018-09-11 NOTE — NURSING NOTE
Slept entire night post pain medication, uses urinal at HS, staff empties, call bell within reach   See assessment for info , unchanged

## 2018-09-11 NOTE — PROGRESS NOTES
09/11/18 1000   Pain Assessment   Pain Assessment 0-10   Pain Score Worst Possible Pain   Pain Location Knee   Pain Orientation Bilateral   Restrictions/Precautions   Precautions Fall Risk   Weight Bearing Restrictions Yes   RLE Weight Bearing Per Order WBAT   LLE Weight Bearing Per Order WBAT   ROM Restrictions No   Transfer Bed/Chair/Wheelchair   Limitations Noted In Balance; Endurance;Pain Management;Problem Solving;LE Strength   Adaptive Equipment Roller Walker   Stand Pivot Minimal Assist   Sit to Stand Minimal   Stand to Sit Minimal   Supine to Sit Supervision   Sit to Supine Minimal   Bed, Chair, Wheelchair Transfer (FIM) 4 - Patient completes 75% of all tasks   Functional Standing Tolerance   Time approx 2 minutes during cone stacking activity   Transfers   Sit to Stand 4  Minimal assistance   Stand to Sit 4  Minimal assistance   Stand pivot 4  Minimal assistance   Therapeutic Exercise - ROM   UE-ROM Yes   ROM- Right Upper Extremities   RUE ROM Comment arm bike 2 5-minute trials   ROM - Left Upper Extremities    LUE ROM Comment arm bike 2 5-minute trials   Therapeutic Excerise-Strength   UE Strength Yes   Right Upper Extremity- Strength   RUE Strength Comment using yellow stretch theraband, x20 shoulder flexion/extension, horizontal ABD/ADD, protraction/retraction   Left Upper Extremity-Strength   LUE Strength Comment using yellow stretch theraband, x20 shoulder flexion/extension, horizontal ABD/ADD, protraction/retraction   Exercise Tools   Exercise Tools (yellow stretch theraband, arm bike)   Neuromuscular Education   Weight Bearing Technique (beared weight through LUE while stacking cones and standing)   Cognition   Overall Cognitive Status WFL   Arousal/Participation Alert; Responsive; Cooperative   Attention Within functional limits   Orientation Level Oriented X4   Memory Within functional limits   Following Commands Follows one step commands without difficulty   Additional Activities   Additional Activities Comments practiced LB dressing with reacher and sock aide   Activity Tolerance   Activity Tolerance Patient limited by pain   Assessment   Treatment Assessment Pt agreeable to OT session this AM  Received lying supine in bed  Supervision supine to sit, Sergio all other transfers  Propelled self in w/c using arms to OT room  Arm bike 2 5-minute trials with rest break between trials  Practiced LB dressing with reacher and sock aide to assess transferrability of skills; pt demonstrated good carryover of techniques  UE strengthening using yellow stretch theraband: x20 shoulder flexion/extension, horizontal ABD/ADD, protraction/retraction  Standing/activity tolerance: stood for approx 1 5 minutes while stacking cones at tabletop height; requested to sit 2* muscle spasm in LUE from bearing weight through UE  OT retrieved pt towel with hot water in an attempt to reduce spasm  Returned to room and left lying supine in bed  Pt would benefit from continued skilled OT services in order to maximize independence in self care, functional transfers, activity/standing tolerance, and UE/LE strength  Prognosis Good   Problem List Decreased strength;Decreased range of motion;Decreased endurance; Impaired balance;Decreased mobility; Decreased safety awareness;Orthopedic restrictions;Pain   Plan   Treatment/Interventions ADL retraining;Functional transfer training;LE strengthening/ROM; Therapeutic exercise; Endurance training;Patient/family training;Equipment eval/education; Compensatory technique education;OT   Progress Slow progress, decreased activity tolerance   OT Therapy Minutes   OT Time In 1000   OT Time Out 1102   OT Total Time (minutes) 62   OT Mode of treatment - Individual (minutes) 30   OT Mode of treatment - Concurrent (minutes) 32

## 2018-09-11 NOTE — PCC NURSING
9/10/18 AOx3 , able to make needs known, pain controlled with current regiment  Staples intact bilateral knees, no S/S of infection , BRODERICK  Uses urinal at HS , staff empties  Wears SCD'S while in bed  Lungs clear  Trace edema LLE  Remained in bed this shift

## 2018-09-11 NOTE — PROGRESS NOTES
09/11/18 0830   Pain Assessment   Pain Assessment 0-10   Pain Score 8   Pain Type Surgical pain   Pain Location Knee   Pain Orientation Bilateral   Cognition   Overall Cognitive Status WFL   Arousal/Participation Alert; Responsive; Cooperative   Attention Within functional limits   Orientation Level Oriented X4   Memory Within functional limits   Following Commands Follows one step commands without difficulty   Subjective   Subjective I am hurting like usual   Transfer Bed/Chair/Wheelchair   Limitations Noted In Balance; Coordination; Endurance;Pain Management;LE Strength   Adaptive Equipment Roller Walker   Stand Pivot Contact Guard   [facility]   Stand to [facility]   Supine to Sit Supervision   Sit to Supine Supervision   All Transfer Contact [employer], Chair, Wheelchair Transfer (FIM) 4 - Patient requires steadying assist or light touching   Ambulation   Primary Discharge Mode of Locomotion Walk   Walk Assist Level Contact Guard   Gait Pattern Antalgic; Slow Yomaira;Decreased foot clearance   Assist Device Roller Walker   Distance Walked (feet) 11 ft  (Pt amb 8', 10', and 11' with RW)   Limitations Noted In Balance; Endurance;Strength   Findings level   Walking (FIM) 4 - Patient requires steadying assist or light touching AND distance 150 feet or more, no rest   Therapeutic Interventions   Strengthening seated and supine ther ex 30 reps   Other gait training    Assessment   Treatment Assessment Pt amb to increase amb distance by a couple feet today but continues to be limited by pain in B knees; Pt amb up to 6' with RW and close supervision; Pt completed seated and supine ther ex to increase ROM and strength; Pt has limited ROM in B knees with limited layo for gentle self stretching   PT Barriers   Physical Impairment Decreased strength;Decreased range of motion;Decreased endurance; Impaired balance;Decreased mobility   Functional Limitation Walking;Transfers;Standing   Plan Treatment/Interventions Functional transfer training;LE strengthening/ROM; Therapeutic exercise;Gait training   Progress Slow progress, decreased activity tolerance   PT Therapy Minutes   PT Time In 0830   PT Time Out 0930   PT Total Time (minutes) 60   PT Mode of treatment - Individual (minutes) 30   PT Mode of treatment - Concurrent (minutes) 30   PT Mode of treatment - Group (minutes) 0   PT Mode of treatment - Co-treat (minutes) 0   PT Mode of Teatment - Total time(minutes) 60 minutes   Therapy Time missed   Time missed?  No

## 2018-09-11 NOTE — TREATMENT PLAN
Individualized Plan of [address] J[address] 68 y o  male MRN: [id number]  Unit/Bed#: -01 Encounter: [id number]     PATIENT INFORMATION  ADMISSION DATE: 9/7/2018  1:55 PM ELLEN CATEGORY:Orthopedic Disorders:  08 62  Bilateral Knee Replacements   ADMISSION DIAGNOSIS: S/P knee replacement [Z96 [id number]]  EXPECTED LOS: 10-14 days     MEDICAL/FUNCTIONAL PROGNOSIS  Based on my assessment of the patient's medical conditions and current functional status, the prognosis for attaining medical and functional goals or the IRF stay is:  Good    Medical Goals: Patient will be medically [facility]    [address]: Home - Assistance    Is a 24-hr caregiver available? No  Has discharge plan been discussed with primary caregiver? Yes  Date of Discussion: unknown date    ANTICIPATED FOLLOW-UP SERVICE:   Outpatient Therapy Services: PT and OT            DISCIPLINE SPECIFIC PLANS:  Required Disciplines & Services: Rehabillitation Nursing    REQUIRED THERAPY:  Therapy Hours per Day Days per Week Total Days   Physical Therapy 1 5 5 5   Occupational Therapy 1 5 5 5   NOTE: Additional therapy time(s) may be added as appropriate to meet patient needs and to achieve functional goals        ANTICIPATED FUNCTIONAL OUTCOMES:  ADL: Patient will return to premorbid level for ADLs upon completion of rehab program   Bladder/Bowel: Patient will return to premorbid level for bladder/bowel management upon completion of rehab program   Transfers: Patient will be independent with transfers with least restrictive device upon completion of rehab program   Locomotion: Patient will be independent with locomotion with least restrictive device upon completion of rehab program   Cognitive: Patient will return to premorbid level of cognitive activity upon completion of rehab program     DISCHARGE PLANNING NEEDS  Equipment needs: tbd      REHAB ANTICIPATED PARTICIPATION RESTRICTIONS:  Assist with Mobility and Rquires Assist with ADLS

## 2018-09-11 NOTE — PROGRESS NOTES
Progress Note - Rosa Lucas 1945, 68 y o  male MRN: [id number]    Unit/Bed#: [facility] 221-01 Encounter: [id number]    Primary Care Provider: Kel Taylor   Date and time admitted to hospital: 9/7/2018  1:55 PM        * Primary osteoarthritis of both knees   Assessment & Plan    S/P B/L TKA WBAT with pain    PT/OT  Arixtra DVT prophy  Pepcid GI prophy   OxyIR and Lidoderm  Robaxin for spasms  Keflex to complete 5 d today        Acute blood loss as cause of postoperative anemia   Assessment & Plan    Post op anemia    FeSO4  Monitor CBC        Gastroesophageal reflux disease without esophagitis   Assessment & Plan    GERD    Pepcid            Vitals:  Temp:  [97 8 °F (36 6 °C)-98 5 °F (36 9 °C)] 97 8 °F (36 6 °C)  HR:  [] 107  Resp:  [18] 18  BP: (120-162)/(64-88) 120/64    Physical Exam:  General: alert, no apparent distress, cooperative and comfortable  HENMT: Head: Normal, normocephalic, atraumatic  Eye: Normal external eye, conjunctiva, lids   Ears: Normal external ears  Nose: Normal external nose, mucus membranes  COR: [id number]  Pulmonary: chest expansion normal, no retractions, no accessory muscle usage, no wheezes, rales, or rhonchi   Abdomen: soft, nontender, nondistended, no masses or organomegaly  Skin/Extremity: b/l knee incisions intact with staples, mod edema, calves soft NT  Neurologic: Awake alert orientedx3  Non focal   Psych: normal mood, behavior, speech, dress, and thought processes  Musculoskeletal: AROM wfl all extremities  Patient is progressing with ADLs and functional mobility  Pt amb up to 6' with RW and close supervision;     Team conference held 35 min  Greater than 50% of time spent coordinating/ counseling patient care      Current Facility-Administered Medications   Medication Dose Route Frequency Provider Last Rate Last Dose    acetaminophen (TYLENOL) tablet 650 mg  650 mg Oral Q6H PRN Yocasta Pemberton MD   650 mg at 09/11/18 0801    aluminum-magnesium hydroxide-simethicone (MYLANTA) 200-200-20 mg/5 mL oral suspension 30 mL  30 mL Oral Q6H PRN Arturo Breen MD        bisacodyl (DULCOLAX) rectal suppository 10 mg  10 mg Rectal Daily PRN Arturo Breen MD        cephalexin (KEFLEX) capsule 500 mg  500 mg Oral Q8H Antoine Mott MD   500 mg at 09/11/18 0532    docusate sodium (COLACE) capsule 100 mg  100 mg Oral BID Arturo Breen MD   100 mg at 09/11/18 0915    famotidine (PEPCID) tablet 20 mg  20 mg Oral Daily Arturo Breen MD   20 mg at 09/11/18 0916    ferrous sulfate tablet 325 mg  325 mg Oral BID With Meals Arturo Breen MD   325 mg at 09/11/18 0759    fondaparinux (ARIXTRA) subcutaneous injection 2 5 mg  2 5 mg Subcutaneous Daily Arturo Breen MD   2 5 mg at 09/11/18 0916    lidocaine (LIDODERM) 5 % patch 2 patch  2 patch Transdermal Daily Arturo Breen MD   2 patch at 09/11/18 0915    methocarbamol (ROBAXIN) tablet 500 mg  500 mg Oral Q6H PRN Arturo Breen MD   500 mg at 09/10/18 1531    multivitamin-minerals (CENTRUM) tablet 1 tablet  1 tablet Oral Daily Arturo Breen MD   1 tablet at 09/11/18 0916    oxyCODONE (ROXICODONE) IR tablet 10 mg  10 mg Oral Q4H PRN Arturo Breen MD   10 mg at 09/10/18 0955    oxyCODONE (ROXICODONE) IR tablet 15 mg  15 mg Oral Q4H PRN Arturo Breen MD   15 mg at 09/11/18 0534    polyethylene glycol (MIRALAX) packet 17 g  17 g Oral Daily Arturo Breen MD   17 g at 09/11/18 0916    senna (SENOKOT) tablet 8 6 mg  1 tablet Oral HS Arturo Breen MD            Laboratory:    Lab Results   Component Value Date    HGB 10 3 (L) 09/10/2018    HCT 30 1 (L) 09/10/2018    WBC 10 40 09/08/2018     Lab Results   Component Value Date    BUN 14 09/08/2018     09/08/2018    K 3 7 09/08/2018     09/08/2018    CREATININE 0 66 (L) 09/08/2018     Lab Results   Component Value Date    PROTIME 11 6 08/16/2018    INR 1 00 08/16/2018

## 2018-09-12 PROCEDURE — 97530 THERAPEUTIC ACTIVITIES: CPT

## 2018-09-12 PROCEDURE — 97537 COMMUNITY/WORK REINTEGRATION: CPT

## 2018-09-12 PROCEDURE — 97116 GAIT TRAINING THERAPY: CPT

## 2018-09-12 PROCEDURE — 97110 THERAPEUTIC EXERCISES: CPT

## 2018-09-12 PROCEDURE — 97535 SELF CARE MNGMENT TRAINING: CPT

## 2018-09-12 RX ADMIN — ALUMINUM HYDROXIDE, MAGNESIUM HYDROXIDE, AND SIMETHICONE 30 ML: 200; 200; 20 SUSPENSION ORAL at 07:46

## 2018-09-12 RX ADMIN — Medication 1 TABLET: at 09:14

## 2018-09-12 RX ADMIN — Medication 325 MG: at 07:44

## 2018-09-12 RX ADMIN — SENNOSIDES 8.6 MG: 8.6 TABLET, FILM COATED ORAL at 21:44

## 2018-09-12 RX ADMIN — LIDOCAINE 2 PATCH: 50 PATCH TOPICAL at 09:13

## 2018-09-12 RX ADMIN — DOCUSATE SODIUM 100 MG: 100 CAPSULE, LIQUID FILLED ORAL at 09:14

## 2018-09-12 RX ADMIN — Medication 1 TABLET: at 12:01

## 2018-09-12 RX ADMIN — DOCUSATE SODIUM 100 MG: 100 CAPSULE, LIQUID FILLED ORAL at 17:43

## 2018-09-12 RX ADMIN — FONDAPARINUX SODIUM 2.5 MG: 2.5 INJECTION, SOLUTION SUBCUTANEOUS at 09:13

## 2018-09-12 RX ADMIN — FAMOTIDINE 20 MG: 20 TABLET, FILM COATED ORAL at 09:14

## 2018-09-12 RX ADMIN — ACETAMINOPHEN 650 MG: 325 TABLET ORAL at 07:43

## 2018-09-12 RX ADMIN — OXYCODONE HYDROCHLORIDE 15 MG: 5 TABLET ORAL at 17:45

## 2018-09-12 RX ADMIN — OXYCODONE HYDROCHLORIDE 15 MG: 5 TABLET ORAL at 21:44

## 2018-09-12 RX ADMIN — Medication 325 MG: at 16:49

## 2018-09-12 RX ADMIN — POLYETHYLENE GLYCOL 3350 17 G: 17 POWDER, FOR SOLUTION ORAL at 09:13

## 2018-09-12 RX ADMIN — OXYCODONE HYDROCHLORIDE 15 MG: 5 TABLET ORAL at 05:29

## 2018-09-12 RX ADMIN — OXYCODONE HYDROCHLORIDE 15 MG: 5 TABLET ORAL at 10:03

## 2018-09-12 RX ADMIN — CEPHALEXIN 500 MG: 500 CAPSULE ORAL at 05:15

## 2018-09-12 NOTE — PROGRESS NOTES
Progress Note - Luke Allen 1945, 68 y o  male MRN: [id number]    Unit/Bed#: [facility] 221-01 Encounter: [id number]    Primary Care Provider: Candy Hurst   Date and time admitted to hospital: 9/7/2018  1:55 PM        * Primary osteoarthritis of both knees   Assessment & Plan    S/P B/L TKA WBAT with pain    PT/OT  Arixtra DVT prophy  Pepcid GI prophy  OxyIR and Lidoderm  Robaxin for spasms  Keflex completed        Acute blood loss as cause of postoperative anemia   Assessment & Plan    Post op anemia    FeSO4  Monitor CBC        Gastroesophageal reflux disease without esophagitis   Assessment & Plan    GERD    Pepcid          Vitals:  Temp:  [96 3 °F (35 7 °C)-96 5 °F (35 8 °C)] 96 3 °F (35 7 °C)  HR:  [88-95] 88  Resp:  [18] 18  BP: (132-134)/(70-80) 134/70    Physical Exam:  General: alert, no apparent distress, cooperative and comfortable  BM today  HENMT: Head: Normal, normocephalic, atraumatic  Poor dentition  Eye: Normal external eye, conjunctiva, lids   Ears: Normal external ears  Nose: Normal external nose, mucus membranes  COR: [id number]  Pulmonary: chest expansion normal, no retractions, no accessory muscle usage, no wheezes, rales, or rhonchi   Abdomen: soft, nontender, nondistended, no masses or organomegaly  Skin/Extremity: b/l knee incisions C/D/Staples intact, Mod edema, resolving ecchymosis  Calves soft, NT  Neurologic: Awake alert orientedx3, non focal   Psych: normal mood, behavior, speech, dress, and thought processes  Musculoskeletal: limited rom b/l knees  Patient is progressing with ADLs and functional mobility  LE dressing with Supervision        Current Facility-Administered Medications   Medication Dose Route Frequency Provider Last Rate Last Dose    acetaminophen (TYLENOL) tablet 650 mg  650 mg Oral Q6H PRN Clare Kaba MD   650 mg at 09/12/18 0743    aluminum-magnesium hydroxide-simethicone (MYLANTA) 200-200-20 mg/5 mL oral suspension 30 mL  30 mL Oral Q6H PRN Sioban MD Mg   30 mL at 09/12/18 0746    BIOTENE MOISTURIZING MOUTH SOLN 1 spray  1 spray Buccal 4x Daily PRN Gabby Reddy MD   1 spray at 09/12/18 0538    bisacodyl (DULCOLAX) rectal suppository 10 mg  10 mg Rectal Daily PRN Gabby Reddy MD        docusate sodium (COLACE) capsule 100 mg  100 mg Oral BID Gabby Reddy MD   100 mg at 09/11/18 1737    famotidine (PEPCID) tablet 20 mg  20 mg Oral Daily Gabby eRddy MD   20 mg at 09/11/18 0916    ferrous sulfate tablet 325 mg  325 mg Oral BID With Meals Gabby Reddy MD   325 mg at 09/12/18 0744    fondaparinux (ARIXTRA) subcutaneous injection 2 5 mg  2 5 mg Subcutaneous Daily Gabby Reddy MD   2 5 mg at 09/11/18 0916    lidocaine (LIDODERM) 5 % patch 2 patch  2 patch Transdermal Daily Gabby Reddy MD   2 patch at 09/11/18 0915    methocarbamol (ROBAXIN) tablet 500 mg  500 mg Oral Q6H PRN Gabby Reddy MD   500 mg at 09/11/18 1009    multivitamin-minerals (CENTRUM) tablet 1 tablet  1 tablet Oral Daily Gabby Reddy MD   1 tablet at 09/11/18 0916    NON FORMULARY   Sublingual Q4H PRN Gabby Reddy MD        oxyCODONE (ROXICODONE) IR tablet 10 mg  10 mg Oral Q4H PRN Gabby Reddy MD   10 mg at 09/10/18 0955    oxyCODONE (ROXICODONE) IR tablet 15 mg  15 mg Oral Q4H PRN Gabby Reddy MD   15 mg at 09/12/18 0529    polyethylene glycol (MIRALAX) packet 17 g  17 g Oral Daily Gabby Reddy MD   17 g at 09/11/18 0916    senna (SENOKOT) tablet 8 6 mg  1 tablet Oral HS Gabby Reddy MD   8 6 mg at 09/11/18 2220        Laboratory:    Lab Results   Component Value Date    HGB 10 3 (L) 09/10/2018    HCT 30 1 (L) 09/10/2018    WBC 10 40 09/08/2018     Lab Results   Component Value Date    BUN 14 09/08/2018     09/08/2018    K 3 7 09/08/2018     09/08/2018    CREATININE 0 66 (L) 09/08/2018     Lab Results   Component Value Date    PROTIME 11 6 08/16/2018    INR 1 00 08/16/2018

## 2018-09-12 NOTE — PROGRESS NOTES
09/12/18 0700   Pain Assessment   Pain Assessment 0-10   Pain Score 8   Pain Location Knee   Pain Orientation Bilateral   Restrictions/Precautions   Precautions Fall Risk   Weight Bearing Restrictions Yes   RLE Weight Bearing Per Order WBAT   LLE Weight Bearing Per Order WBAT   ROM Restrictions No   Grooming   Able To Initiate Tasks; Acquire Items; Wash/Dry Face;Wash/Dry Hands   Limitation Noted In Safety   Grooming (FIM) 5 - [place] sets up supplies or applies device   Bathing   Assessed Bath Style Sponge Bath   Anticipated D/C Bath Style Shower   Able to [place] No   Able to Wash/Rinse/Dry (body part) Left Arm;Right Arm;L Upper Leg;R Upper Leg;L Lower Leg/Foot;R Lower Leg/Foot;Chest;Abdomen  (pt washed perineal and buttocks after using bathroom earlier)   Limitations Noted in Balance; Coordination;ROM;Safety;Strength   Positioning Seated   Adaptive Equipment Longhand Sponge   Bathing (FIM) 5 - Patient requires supervision/monitoring but completes 10/10 parts   Dressing/Undressing Clothing   Remove UB Clothes Pullover Shirt   Remove LB Clothes [facility] UB Clothes Pullover Shirt   Don LB Clothes Socks   Limitations Noted In Balance; Endurance; Safety;Strength;ROM   Adaptive Equipment Dressing Stick; Sock Aide   Positioning Sit Edge Of Bed   UB Dressing (FIM) 6 - Patient requires assistive device/extra time/safety concerns but completes independently   LB Dressing (FIM) 5 - Patient requires supervision/monitoring   Transfer Bed/Chair/Wheelchair   Limitations Noted In Balance; Endurance;Pain Management;LE Strength   Adaptive Equipment Roller Walker   Sit to Stand Supervision   Stand to Sit Supervision   Supine to Sit Supervision   Sit to Supine Supervision   Bed, Chair, Wheelchair Transfer (FIM) 4 - Patient requires steadying assist or light touching   Transfers   Sit to Stand 5  Supervision   Stand to Sit 5  Supervision   Cognition   Overall Cognitive Status [facility]   Arousal/Participation Alert; Responsive; Cooperative   Attention Within functional limits   Orientation Level Oriented X4   Memory Within functional limits   Following Commands Follows all commands and directions without difficulty   Activity Tolerance   Activity Tolerance Patient limited by pain  (also reported heartburn at end of session)   Assessment   Treatment Assessment Pt agreeable to OT session this AM  Received lying supine in bed  Completed ADL of washing and dressing; current LOF and details listed in specific sections  Pt very skilled with dressing AE and problem solving through the process  Declined using toilet or brushing teeth 2* used toilet earlier this AM and wanted to wait until after breakfast to brush teeth  Pt reported bilat knee pain and also heartburn at end of session, however is demonstrating improvements in transfers, LB dressing, and activity tolerance overall  Pt would benefit from continued skilled OT services in order to maximize independence in self care, functional transfers, and activity tolerance  Prognosis Good   Problem List Decreased strength;Decreased range of motion;Decreased endurance; Impaired balance;Decreased mobility; Decreased safety awareness;Orthopedic restrictions;Pain   Plan   Treatment/Interventions ADL retraining;Functional transfer training;LE strengthening/ROM; Endurance training; Therapeutic exercise;Patient/family training;Equipment eval/education; Compensatory technique education;OT   Progress Progressing toward goals   OT Therapy Minutes   OT Time In 0700   OT Time Out 0732   OT Total Time (minutes) 32   OT Mode of treatment - Individual (minutes) 32   Therapy Time missed   Time missed?  No

## 2018-09-12 NOTE — PROGRESS NOTES
09/12/18 1000   Pain Assessment   Pain Assessment 0-10   Pain Score Worst Possible Pain   Pain Type Surgical pain   Pain Location Knee   Pain Orientation Bilateral   Cognition   Overall Cognitive Status WFL   Arousal/Participation Alert; Responsive; Cooperative   Attention Within functional limits   Orientation Level Oriented X4   Memory Within functional limits   Following Commands Follows all commands and directions without difficulty   Subjective   Subjective It hurts of course   Transfer Bed/Chair/Wheelchair   Limitations Noted In Balance; Coordination; Endurance;Pain Management;LE Strength   Adaptive Equipment Roller Walker   Stand Pivot Supervision   Sit to Stand Supervision   Stand to Sit Supervision   Supine to Sit Supervision   Sit to Supine Supervision   All Transfer Supervision   Bed, Chair, Wheelchair Transfer (FIM) 5 - Patient requires supervision/monitoring   Ambulation   Primary Discharge Mode of Locomotion Walk   Walk Assist Level Supervision   Gait Pattern Antalgic; Inconsistant Yomaira; Slow Yomaira   Assist Device Roller Walker   Distance Walked (feet) 16 ft  (Pt amb 16' and 14' with RW)   Limitations Noted In Balance; Endurance;Strength   Findings level   Walking (FIM) 1 - Patient ambulates less than 49 feet regardless of assist/device/set up   Stairs   Type Stairs   # of Steps 3   Assist Devices Bilateral Rail   Findings 3-4" steps CGA   Stairs (FIM) 1 - Patient goes up and down less than 4 stairs regardless of assist/device/set up   Therapeutic Interventions   Strengthening supine and seated ther ex 30 reps   Other gait training and community re-ed   Assessment   Treatment Assessment Pt is pleasant and cooperative today; Pt is supervision for transfers and bed mobility; Pt increased amb up to 12' today with RW and supervision; Pt is limited by pain in B knees and has decreased ROM in B knees;  Up/down 3-4" steps today with bilateral hand rail and CGA; Pt performed seated and supine ther ex to increase ROM and strength B LE   PT Barriers   Physical Impairment Decreased strength;Decreased range of motion;Decreased endurance; Impaired balance;Decreased mobility   Functional Limitation Walking;Transfers;Standing;Stair negotiation   Plan   Treatment/Interventions Functional transfer training;LE strengthening/ROM; Elevations; Therapeutic exercise;Gait training   Progress Progressing toward goals   PT Therapy Minutes   PT Time In 1000   PT Time Out 1100   PT Total Time (minutes) 60   PT Mode of treatment - Individual (minutes) 30   PT Mode of treatment - Concurrent (minutes) 30   PT Mode of treatment - Group (minutes) 0   PT Mode of treatment - Co-treat (minutes) 0   PT Mode of Teatment - Total time(minutes) 60 minutes   Therapy Time missed   Time missed?  No

## 2018-09-12 NOTE — CASE MANAGEMENT
Tx team recommendations reviewed with Pt & message left for Pt's spouse  Target DC date set for 9/17 after [address] [place] [address], depending on Pt status & progress  Pt reported that he will not have transportation for outpatient therapy & reqesuted [facility] upon DC  SW will re-evaluate with [address] [place] [address] team on 9/14 to finalize DC planning  Pt has a RW

## 2018-09-12 NOTE — PLAN OF CARE
Problem: Nutrition/Hydration-ADULT  Goal: Nutrient/Hydration intake appropriate for improving, restoring or maintaining nutritional needs  Monitor and assess patient's nutrition/hydration status for malnutrition (ex- brittle hair, bruises, dry skin, pale skin and conjunctiva, muscle wasting, smooth red tongue, and disorientation)  Collaborate with interdisciplinary team and initiate plan and interventions as ordered  Monitor patient's weight and dietary intake as ordered or per policy  Utilize nutrition screening tool and intervene per policy  Determine patient's food preferences and provide high-protein, high-caloric foods as appropriate  INTERVENTIONS:  - Monitor oral intake, urinary output, labs, and treatment plans  - Assess nutrition and hydration status and recommend course of action  - Evaluate amount of meals eaten  - Assist patient with eating if necessary   - Allow adequate time for meals  - Recommend/ encourage appropriate diets, oral nutritional supplements, and vitamin/mineral supplements  - Order, calculate, and assess calorie counts as needed  - Recommend, monitor, and adjust tube feedings and TPN/PPN based on assessed needs  - Assess need for intravenous fluids  - Provide specific nutrition/hydration education as appropriate  - Include patient/family/caregiver in decisions related to nutrition  Outcome: Adequate for Discharge  Rafaeal Newell is receiving a regular diet  He stated he is not eating as he usually does since he is not as active  Patient is concerned about constipation, he is receiving a stool softener, also discussed taking prune juice or prunes

## 2018-09-12 NOTE — NURSING NOTE
When giving patient his am antibiotic he stated to writer" you know I didn't have a pain pill in 9 hours"  I ask him if he had pain , he answered "yes', patient able to express needs  Educated patient to express pain to staff  Verbalized understanding  Medicated for pain

## 2018-09-12 NOTE — PROGRESS NOTES
09/12/18 1230   Pain Assessment   Pain Assessment 0-10   Pain Score (worst pain after movement)   Pain Location Knee   Pain Orientation Bilateral   Restrictions/Precautions   Precautions Fall Risk   Weight Bearing Restrictions Yes   RLE Weight Bearing Per Order WBAT   LLE Weight Bearing Per Order WBAT   ROM Restrictions No   Grooming   Able To Initiate Tasks;Brush/Clean Teeth   Limitation Noted In Safety;Strength   Grooming (FIM) 5 - [place] sets up supplies or applies device   Transfer Bed/Chair/Wheelchair   Limitations Noted In Balance; Coordination; Endurance;Pain Management;LE Strength   Adaptive Equipment Roller Walker   Sit to Stand Minimal   Stand to Sit Minimal   Supine to Sit Supervision   Sit to Supine Supervision   Findings started session as supervision, ended session as Sergio 2* increased pain and fatigue   Bed, Chair, Wheelchair Transfer (FIM) 4 - Patient completes 75% of all tasks   Functional Standing Tolerance   Time completed sit to stand transfer approx 7 times this session, max time standing approx 3 minutes   Activity UE strengthening, placing clothespins on rods   Comments pt reported sit to stand becoming easier   Transfers   Sit to Stand 4  Minimal assistance   Stand to Sit 4  Minimal assistance   Therapeutic Exercise - ROM   UE-ROM Yes   ROM- Right Upper Extremities   RUE ROM Comment RLE: picked up small wooden objects with toes, bent knee to place in tub; kicked soccer ball back and forth with OT while seated; RUE: built pipe construction using picture as guide   ROM - Left Upper Extremities    LUE ROM Comment LLE: picked up small objects with feet, bent knee to place in tub; kicked soccer ball back and forth with OT while seated; LUE: built pipe construction using picture as guide   Therapeutic Excerise-Strength   UE Strength Yes   Right Upper Extremity- Strength   RUE Strength Comment one set completed sitting, one set completed standing - 2x15 using 5# therabar: shoulder flexion/extension, elbow flexion/extension; 1x15 using 5# therabar: horizontal ABD/ADD; 2x10 using 5# dumbbell: pronation/supination   Left Upper Extremity-Strength   LUE Strength Comment one set completed sitting, one set completed standing - 2x15 using 5# therabar: shoulder flexion/extension, elbow flexion/extension; 1x15 using 5# therabar: horizontal ABD/ADD; 2x10 using 5# dumbbell: pronation/supination   Exercise Tools   Exercise Tools (5# therabar, 5# dumbbell)   Cognition   Overall Cognitive Status WFL   Arousal/Participation Alert; Responsive; Cooperative   Attention Within functional limits   Orientation Level Oriented X4   Memory Within functional limits   Following Commands Follows all commands and directions without difficulty   Assessment   Treatment Assessment Pt agreeable to OT session this PM  Received lying supine in bed  UE strengthening with one set of each exercises sitting EOB and another set standing with RW for support  2x15 using 5# therabar: shoulder flexion/extension, elbow flexion/extension; 1x15 using 5# therabar: horizontal ABD/ADD; 2x10 using 5# dumbbell: pronation/supination  Rest breaks utilized as needed between sets  Propelled self to OT room using UE  LE ROM/strength: picked up small objects with toes and bent knee to place in tub  Pt with more difficulty in RLE than LLE during this exercise and required more breaks for pain on that side  Kicked soccer ball back and forth to OT while seated; again, pt with increased difficulty in RLE during this exercise  Reported worst possible pain at end of these exercises  Built pipe constructions using picture as guide while seated  Stood to place clothespins on rods, removed them while seated  Pt completed many sit to stand transfers during this session and reported they were becoming easier, however he reported severe pain by the end of the session  Propelled self back to w/c and returned to supine   Pt would benefit from continued skilled OT services in order to maximize LE ROM/strength, functional transfers, standing/activity tolerance, and UE strength  Prognosis Good   Problem List Decreased strength;Decreased range of motion;Decreased endurance; Impaired balance;Decreased mobility;Orthopedic restrictions;Pain   Plan   Treatment/Interventions ADL retraining;Functional transfer training;LE strengthening/ROM; Therapeutic exercise; Endurance training;Patient/family training;Equipment eval/education; Compensatory technique education;OT   Progress Progressing toward goals   OT Therapy Minutes   OT Time In 1230   OT Time Out 1403   OT Total Time (minutes) 93   OT Mode of treatment - Individual (minutes) 60   OT Mode of treatment - Concurrent (minutes) 33   Therapy Time missed   Time missed?  No

## 2018-09-12 NOTE — ASSESSMENT & PLAN NOTE
S/P B/L TKA WBAT with pain    PT/OT  Arixtra DVT prophy  Pepcid GI prophy  OxyIR and Lidoderm  Robaxin for spasms  Keflex completed

## 2018-09-12 NOTE — PCC CARE MANAGEMENT
9/11/18 - Tx team recommendations reviewed with Pt & message left for Pt's spouse  Target DC date set for 9/17 after [address], depending on Pt status & progress  Pt reported that he will not have transportation for outpatient therapy & reqesuted [facility] upon DC  SW will re-evaluate with [address] team on 9/14 to finalize DC planning  Pt has a RW

## 2018-09-12 NOTE — PLAN OF CARE
DISCHARGE PLANNING     Discharge to home or other facility with appropriate resources Progressing        INFECTION - ADULT     Absence or prevention of progression during hospitalization Progressing        PAIN - ADULT     Verbalizes/displays adequate comfort level or baseline comfort level Progressing        Potential for Falls     Patient will remain free of falls Progressing        Prexisting or High Potential for Compromised Skin Integrity     Skin integrity is maintained or improved Progressing        SAFETY ADULT     Maintain or return to baseline ADL function Progressing     Maintain or return mobility status to optimal level Progressing

## 2018-09-13 PROCEDURE — 97110 THERAPEUTIC EXERCISES: CPT

## 2018-09-13 PROCEDURE — 97530 THERAPEUTIC ACTIVITIES: CPT

## 2018-09-13 PROCEDURE — 97116 GAIT TRAINING THERAPY: CPT

## 2018-09-13 PROCEDURE — 97537 COMMUNITY/WORK REINTEGRATION: CPT

## 2018-09-13 PROCEDURE — 97535 SELF CARE MNGMENT TRAINING: CPT

## 2018-09-13 RX ORDER — POLYETHYLENE GLYCOL 3350 17 G/17G
17 POWDER, FOR SOLUTION ORAL DAILY
Qty: 14 EACH | Refills: 0 | Status: SHIPPED | OUTPATIENT
Start: 2018-09-14 | End: 2019-12-31 | Stop reason: ALTCHOICE

## 2018-09-13 RX ORDER — FONDAPARINUX SODIUM 2.5 MG/.5ML
2.5 INJECTION SUBCUTANEOUS DAILY
Qty: 10 ML | Refills: 0 | Status: SHIPPED | OUTPATIENT
Start: 2018-09-18 | End: 2019-12-31 | Stop reason: ALTCHOICE

## 2018-09-13 RX ORDER — FONDAPARINUX SODIUM 2.5 MG/.5ML
2.5 INJECTION SUBCUTANEOUS DAILY
Refills: 0
Start: 2018-09-18 | End: 2018-09-28

## 2018-09-13 RX ORDER — LIDOCAINE 50 MG/G
2 PATCH TOPICAL DAILY
Qty: 30 PATCH | Refills: 0 | Status: SHIPPED | OUTPATIENT
Start: 2018-09-14 | End: 2019-12-31 | Stop reason: ALTCHOICE

## 2018-09-13 RX ORDER — SENNOSIDES 8.6 MG
1 TABLET ORAL
Qty: 120 EACH | Refills: 0 | Status: SHIPPED | OUTPATIENT
Start: 2018-09-13 | End: 2019-12-31 | Stop reason: ALTCHOICE

## 2018-09-13 RX ORDER — OXYCODONE HYDROCHLORIDE 10 MG/1
10 TABLET ORAL EVERY 4 HOURS PRN
Qty: 30 TABLET | Refills: 0 | Status: SHIPPED | OUTPATIENT
Start: 2018-09-13 | End: 2018-09-23

## 2018-09-13 RX ADMIN — DOCUSATE SODIUM 100 MG: 100 CAPSULE, LIQUID FILLED ORAL at 08:13

## 2018-09-13 RX ADMIN — SENNOSIDES 8.6 MG: 8.6 TABLET, FILM COATED ORAL at 22:12

## 2018-09-13 RX ADMIN — Medication 325 MG: at 17:43

## 2018-09-13 RX ADMIN — ALUMINUM HYDROXIDE, MAGNESIUM HYDROXIDE, AND SIMETHICONE 30 ML: 200; 200; 20 SUSPENSION ORAL at 07:29

## 2018-09-13 RX ADMIN — OXYCODONE HYDROCHLORIDE 15 MG: 5 TABLET ORAL at 17:43

## 2018-09-13 RX ADMIN — FONDAPARINUX SODIUM 2.5 MG: 2.5 INJECTION, SOLUTION SUBCUTANEOUS at 08:13

## 2018-09-13 RX ADMIN — POLYETHYLENE GLYCOL 3350 17 G: 17 POWDER, FOR SOLUTION ORAL at 08:15

## 2018-09-13 RX ADMIN — Medication 325 MG: at 08:13

## 2018-09-13 RX ADMIN — Medication 1 TABLET: at 08:13

## 2018-09-13 RX ADMIN — LIDOCAINE 2 PATCH: 50 PATCH TOPICAL at 08:13

## 2018-09-13 RX ADMIN — OXYCODONE HYDROCHLORIDE 15 MG: 5 TABLET ORAL at 06:16

## 2018-09-13 RX ADMIN — METHOCARBAMOL 500 MG: 500 TABLET ORAL at 11:07

## 2018-09-13 RX ADMIN — OXYCODONE HYDROCHLORIDE 10 MG: 5 TABLET ORAL at 10:44

## 2018-09-13 RX ADMIN — OXYCODONE HYDROCHLORIDE 15 MG: 5 TABLET ORAL at 22:12

## 2018-09-13 RX ADMIN — DOCUSATE SODIUM 100 MG: 100 CAPSULE, LIQUID FILLED ORAL at 17:43

## 2018-09-13 RX ADMIN — FAMOTIDINE 20 MG: 20 TABLET, FILM COATED ORAL at 08:13

## 2018-09-13 NOTE — NURSING NOTE
Pt alert and oriented with history of short term memory  Pt educated on pain medication times and doses  He did received prn oxy IR at this time, pt offered ice packs as well  Pt is assist x1-2 depending on if pt is fatigued, he does get more fatigued in afternoon  Pt's incisions intact, BRODERICK with no drainage   Pt will begin Arixtra education for self administration in am

## 2018-09-13 NOTE — PROGRESS NOTES
Received patient at 1900, awake and alert, pleasant, B/L knee incision sites well approximated, staples intact, no S/S of infection, areas are BRODERICK, medicated for c/o pain, presently resting in bed watching TV, call bell within easy reach

## 2018-09-13 NOTE — PROGRESS NOTES
09/13/18 1230   Pain Assessment   Pain Assessment 0-10   Pain Score Worst Possible Pain   Pain Type Surgical pain   Pain Location Knee   Pain Orientation Bilateral   Restrictions/Precautions   Precautions Fall Risk;Pain   Weight Bearing Restrictions Yes   RLE Weight Bearing Per Order WBAT   LLE Weight Bearing Per Order WBAT   ROM Restrictions No   Transfer Bed/Chair/Wheelchair   Limitations Noted In Balance; Coordination; Endurance;Pain Management;LE Strength   Adaptive Equipment Roller Walker   Sit to Stand Minimal   Stand to Sit Minimal   Supine to Sit Supervision   Sit to Supine Supervision   Bed, Chair, Wheelchair Transfer (FIM) 4 - Patient requires steadying assist or light touching   Functional Standing Tolerance   Time less than 1 minute, two trials   Activity placed playing cards in respective pockets   Transfers   Sit to Stand 4  Minimal assistance   Stand to Sit 4  Minimal assistance   Therapeutic Exercise - ROM   UE-ROM Yes   ROM- Right Upper Extremities   RUE ROM Comment built pipe constructions using picture as guide   ROM - Left Upper Extremities    LUE ROM Comment built pipe constructions using picture as guide   Therapeutic Excerise-Strength   UE Strength Yes   Right Upper Extremity- Strength   RUE Strength Comment searched for and retrieved small objects in yellow theraputty   Left Upper Extremity-Strength   LUE Strength Comment searched for and retrieved small objects in yellow theraputty   Cognition   Overall Cognitive Status [facility]   Arousal/Participation Alert; Responsive; Cooperative   Attention Within functional limits   Orientation Level Oriented X4   Memory Within functional limits   Following Commands Follows all commands and directions without difficulty   Additional Activities   Additional Activities Comments knee flexion tolerance: sat EOB to play "war" card game   Activity Tolerance   Activity Tolerance Patient limited by pain   Assessment   Treatment Assessment Pt agreeable to OT session this PM  Lying supine in bed; continues to report most severe pain in bilat knees  Overall supervision/Sergio for transfers  Sat EOB and played "war" card game for bilat knee flexion tolerance; played game for 25 minutes and pt reported severe pain but it did not feel worse than at start of session  Propelled self to OT room in w/c using arms  Built pipe constructions using picture as guide while seated at table  Searched for and retrieved small objects in yellow theraputty while seated  Greater focus on seated knee flexion toelrance this session 2* pt's severe knee pain  Attempt x2 to stand and place playing cards in respective pockets at tabletop height; pt stood less than one minute for two trials, had to sit 2* severe knee pain jered in R knee  Propelled self back to OT room, returned to supine  Pt would benefit from continued skilled OT services in order to maximize activity tolerance, strength/ROM, and functional transfers  Prognosis Good   Problem List Decreased strength;Decreased range of motion;Decreased endurance; Impaired balance;Decreased mobility;Orthopedic restrictions;Pain   Plan   Treatment/Interventions ADL retraining;Functional transfer training;LE strengthening/ROM; Therapeutic exercise; Endurance training;Patient/family training;Equipment eval/education; Compensatory technique education;OT   Progress Slow progress, decreased activity tolerance   OT Therapy Minutes   OT Time In 1230   OT Time Out 1340   OT Total Time (minutes) 70   OT Mode of treatment - Individual (minutes) 30   OT Mode of treatment - Concurrent (minutes) 40   Therapy Time missed   Time missed?  No

## 2018-09-13 NOTE — PROGRESS NOTES
09/13/18 1030   Pain Assessment   Pain Assessment 0-10   Pain Score Worst Possible Pain  (reported "20/10" during OT session)   Pain Location Knee   Pain Orientation Bilateral   Restrictions/Precautions   Precautions Fall Risk;Pain   Weight Bearing Restrictions Yes   RLE Weight Bearing Per Order WBAT   LLE Weight Bearing Per Order WBAT   ROM Restrictions No   Transfer Bed/Chair/Wheelchair   Limitations Noted In Balance; Coordination; Endurance;Pain Management;LE Strength   Adaptive Equipment Roller Walker   Sit to Stand Minimal   Stand to Sit Minimal   Supine to Sit Supervision   Sit to Supine Supervision   Bed, Chair, Wheelchair Transfer (FIM) 4 - Patient requires steadying assist or light touching   Toileting   Able to Pull Clothing down yes, up yes  Manage Hygiene Bladder   Limitations Noted In Balance; Safety;LE Strength   Adaptive Equipment Grab Bar   Toileting (FIM) 5 - Patient requires supervision/monitoring   Toilet Transfer   Surface Assessed Standard Commode   Transfer Technique Standard   Limitations Noted In Balance; Endurance;ROM;Safety;LE Strength   Adaptive Equipment Grab Bar   Toilet Transfer (FIM) 4 - Patient requires steadying assist or light touching   Transfers   Sit to Stand 4  Minimal assistance   Stand to Sit 4  Minimal assistance   Toilet transfer 4  Minimal assistance   Therapeutic Excerise-Strength   UE Strength Yes   Right Upper Extremity- Strength   RUE Strength Comment pushed weighted wooden box x30 all planes of motion with rest breaks utilized as needed   Left Upper Extremity-Strength   LUE Strength Comment pushed weighted wooden box x30 all planes of motion with rest breaks utilized as needed   Exercise Tools   Exercise Tools (weighted wooden box)   Cognition   Overall Cognitive Status WFL   Arousal/Participation Alert; Responsive; Cooperative   Attention Within functional limits   Orientation Level Oriented X4   Memory Within functional limits   Following Commands Follows all commands and directions without difficulty   Activity Tolerance   Activity Tolerance Patient limited by pain   Assessment   Treatment Assessment Pt agreeable to OT session this AM  Received lying supine in bed  Overall Sergio-supervision all transfers, including toilet transfer  Pt also supervision with toileting  Functional mobility to toilet using RW, however used w/c to go to OT room; propelled self with bilat UE  UE strengthening/tolerance: pushed weighted wooden box x30 all planes of motion with rest breaks utilized as needed  After the exercises, pt reported "20/10 pain" in R knee; nurse notified and pt received medicine and an ice pack to combat what appeared to be a muscle spasm in pt's anterior LE  Waited approx 7 minutes after intervention, and when pain did not subside pt requested to return to room and continue therapy later  Propelled self using bilat UE to room, returned to supine in bed  Plans made to make up therapy time this afternoon  Pt would benefit from continued skilled OT services in order to maximize independence in self care, functional transfers/mobility, activity tolerance, and UE/strength  Prognosis Good   Problem List Decreased strength;Decreased range of motion;Decreased endurance; Impaired balance;Decreased mobility;Orthopedic restrictions;Pain   Plan   Treatment/Interventions ADL retraining;Functional transfer training;LE strengthening/ROM; Therapeutic exercise; Endurance training;Patient/family training;Equipment eval/education; Compensatory technique education;OT   Progress Slow progress, decreased activity tolerance   OT Therapy Minutes   OT Time In 1030   OT Time Out 1120   OT Total Time (minutes) 50   OT Mode of treatment - Individual (minutes) 20   OT Mode of treatment - Concurrent (minutes) 30   Therapy Time missed   Time missed?  No

## 2018-09-13 NOTE — PROGRESS NOTES
09/13/18 0900   Pain Assessment   Pain Assessment 0-10   Pain Score 8   Pain Type Surgical pain   Pain Location Knee   Pain Orientation Bilateral   Cognition   Overall Cognitive Status WFL   Arousal/Participation Alert; Responsive; Cooperative   Attention Within functional limits   Orientation Level Oriented X4   Memory Within functional limits   Following Commands Follows all commands and directions without difficulty   Subjective   Subjective I am doing okay    Transfer Bed/Chair/Wheelchair   Limitations Noted In Balance; Coordination; Endurance;Pain Management;LE Strength   Adaptive Equipment Roller Walker   Stand Pivot Supervision   Sit to Stand Supervision   Stand to Sit Supervision   Supine to Sit Modified Independent   Sit to Supine Modified Independent   All Transfer Modified Independent;Supervision   Bed, Chair, Wheelchair Transfer (FIM) 5 - Patient requires supervision/monitoring   Ambulation   Primary Discharge Mode of Locomotion Walk   Walk Assist Level Supervision   Gait Pattern Antalgic; Inconsistant Yomaira   Assist Device Roller Walker   Distance Walked (feet) 27 ft  (Pt amb 20', 27', and 18' with RW)   Limitations Noted In Balance; Endurance;Strength   Findings level and carpet   Walking (FIM) 5 - Patient requires supervision/monitoring AND distance 150 feet or more, no rest   Stairs   Type Stairs   # of Steps 3   Assist Devices Bilateral Rail   Findings 3-4" steps supervision   Stairs (FIM) 1 - Patient goes up and down less than 4 stairs regardless of assist/device/set up   Therapeutic Interventions   Strengthening seated ther ex 30 reps   Other gait training and community re-ed   Assessment   Treatment Assessment Pt is improving overall and has increased layo for therapy; Pt increased distance today with amb; Pt amb up to 32' with RW and supervision; Up/down 3-4" steps with bilateral hand rail supervision;  Pt performed seated ther ex to increase ROM and strength B LE   PT Barriers   Physical Impairment Decreased strength;Decreased range of motion;Decreased endurance; Impaired balance;Decreased mobility   Functional Limitation Walking;Transfers;Standing;Stair negotiation   Plan   Treatment/Interventions Functional transfer training;LE strengthening/ROM; Elevations; Therapeutic exercise;Gait training   Progress Progressing toward goals   PT Therapy Minutes   PT Time In 0900   PT Time Out 1000   PT Total Time (minutes) 60   PT Mode of treatment - Individual (minutes) 60   PT Mode of treatment - Concurrent (minutes) 0   PT Mode of treatment - Group (minutes) 0   PT Mode of treatment - Co-treat (minutes) 0   PT Mode of Teatment - Total time(minutes) 60 minutes   Therapy Time missed   Time missed?  No

## 2018-09-13 NOTE — NURSING NOTE
Slept entire shift , able to make needs known , call bell within reach  SCD'S intact B/L legs  See assessment , unchanged

## 2018-09-14 PROCEDURE — 97537 COMMUNITY/WORK REINTEGRATION: CPT

## 2018-09-14 PROCEDURE — 97530 THERAPEUTIC ACTIVITIES: CPT

## 2018-09-14 PROCEDURE — 97535 SELF CARE MNGMENT TRAINING: CPT

## 2018-09-14 PROCEDURE — 97110 THERAPEUTIC EXERCISES: CPT

## 2018-09-14 PROCEDURE — 97116 GAIT TRAINING THERAPY: CPT

## 2018-09-14 RX ADMIN — OXYCODONE HYDROCHLORIDE 15 MG: 5 TABLET ORAL at 02:58

## 2018-09-14 RX ADMIN — LIDOCAINE 2 PATCH: 50 PATCH TOPICAL at 08:00

## 2018-09-14 RX ADMIN — OXYCODONE HYDROCHLORIDE 15 MG: 5 TABLET ORAL at 12:29

## 2018-09-14 RX ADMIN — Medication 325 MG: at 07:58

## 2018-09-14 RX ADMIN — SENNOSIDES 8.6 MG: 8.6 TABLET, FILM COATED ORAL at 21:36

## 2018-09-14 RX ADMIN — OXYCODONE HYDROCHLORIDE 15 MG: 5 TABLET ORAL at 07:57

## 2018-09-14 RX ADMIN — DOCUSATE SODIUM 100 MG: 100 CAPSULE, LIQUID FILLED ORAL at 08:00

## 2018-09-14 RX ADMIN — Medication 325 MG: at 17:32

## 2018-09-14 RX ADMIN — OXYCODONE HYDROCHLORIDE 15 MG: 5 TABLET ORAL at 21:44

## 2018-09-14 RX ADMIN — FONDAPARINUX SODIUM 2.5 MG: 2.5 INJECTION, SOLUTION SUBCUTANEOUS at 08:00

## 2018-09-14 RX ADMIN — OXYCODONE HYDROCHLORIDE 15 MG: 5 TABLET ORAL at 17:32

## 2018-09-14 RX ADMIN — FAMOTIDINE 20 MG: 20 TABLET, FILM COATED ORAL at 08:00

## 2018-09-14 RX ADMIN — DOCUSATE SODIUM 100 MG: 100 CAPSULE, LIQUID FILLED ORAL at 17:32

## 2018-09-14 RX ADMIN — POLYETHYLENE GLYCOL 3350 17 G: 17 POWDER, FOR SOLUTION ORAL at 08:00

## 2018-09-14 RX ADMIN — Medication 1 TABLET: at 08:00

## 2018-09-14 NOTE — PROGRESS NOTES
Progress Note - [place] Cardenas 1945, 68 y o  male MRN: [id number]    Unit/Bed#: [facility] 221-01 Encounter: [id number]    Primary Care Provider: Saad Dodd   Date and time admitted to hospital: 9/7/2018  1:55 PM        * Primary osteoarthritis of both knees   Assessment & Plan    S/P B/L TKA WBAT with pain    PT/OT  Arixtra DVT prophy  Pepcid GI prophy  OxyIR and Lidoderm  Robaxin for spasms  Keflex completed        Acute blood loss as cause of postoperative anemia   Assessment & Plan    Post op anemia    FeSO4  Monitor CBC        Gastroesophageal reflux disease without esophagitis   Assessment & Plan    GERD    Pepcid            Vitals:  Temp:  [96 4 °F (35 8 °C)-97 7 °F (36 5 °C)] 96 4 °F (35 8 °C)  HR:  [] 92  Resp:  [16-18] 16  BP: (130-160)/(68-84) 130/68    Physical Exam:  General: alert, no apparent distress, cooperative and comfortable  HENMT: Head: Normal, normocephalic, atraumatic  Eye: Normal external eye, conjunctiva, lids   Ears: Normal external ears  Nose: Normal external nose, mucus membranes  COR: [id number]  Pulmonary: chest expansion normal, no retractions, no accessory muscle usage, no wheezes, rales, or rhonchi   Abdomen: soft, nontender, nondistended, no masses or organomegaly  Skin/Extremity: b/l knee incisions C/D/Staples intact, Mod edema, resolving ecchymosis  Calves soft, NT  Neurologic: Awake alert orientedx3  Psych: normal mood, behavior, speech, dress, and thought processes  Musculoskeletal: limited rom b/l knees  Patient is progressing with ADLs and functional mobility  Patient ambulated 27 feet with a rolling walker and supervision      Current Facility-Administered Medications   Medication Dose Route Frequency Provider Last Rate Last Dose    acetaminophen (TYLENOL) tablet 650 mg  650 mg Oral Q6H PRN Heriberto Valero MD   650 mg at 09/12/18 0743    aluminum-magnesium hydroxide-simethicone (MYLANTA) 200-200-20 mg/5 mL oral suspension 30 mL  30 mL Oral Q6H PRN Coleman Felix MD   30 mL at 09/13/18 0729    BIOTENE MOISTURIZING MOUTH SOLN 1 spray  1 spray Buccal 4x Daily PRN Coleman Felix MD   1 spray at 09/13/18 0927    bisacodyl (DULCOLAX) rectal suppository 10 mg  10 mg Rectal Daily PRN Coleman Felix MD        [employer] LEG CRAMPS PAIN RELIEF TABS 3 tablet  3 tablet Sublingual Q4H PRN Coleman Felix MD        docusate sodium (COLACE) capsule 100 mg  100 mg Oral BID Coleman Felix MD   100 mg at 09/14/18 0800    famotidine (PEPCID) tablet 20 mg  20 mg Oral Daily Coleman Felix MD   20 mg at 09/14/18 0800    ferrous sulfate tablet 325 mg  325 mg Oral BID With Meals Coleman Felix MD   325 mg at 09/14/18 0758    fondaparinux (ARIXTRA) subcutaneous injection 2 5 mg  2 5 mg Subcutaneous Daily Coleman Felix MD   2 5 mg at 09/14/18 0800    lidocaine (LIDODERM) 5 % patch 2 patch  2 patch Transdermal Daily Coleman Felix MD   2 patch at 09/14/18 0800    methocarbamol (ROBAXIN) tablet 500 mg  500 mg Oral Q6H PRN Coleman Felix MD   500 mg at 09/13/18 1107    multivitamin-minerals (CENTRUM) tablet 1 tablet  1 tablet Oral Daily Coleman Felix MD   1 tablet at 09/14/18 0800    oxyCODONE (ROXICODONE) IR tablet 10 mg  10 mg Oral Q4H PRN Coleman Felix MD   10 mg at 09/13/18 1044    oxyCODONE (ROXICODONE) IR tablet 15 mg  15 mg Oral Q4H PRN Coleman Felix MD   15 mg at 09/14/18 0757    polyethylene glycol (MIRALAX) packet 17 g  17 g Oral Daily Coleman Felix MD   17 g at 09/14/18 0800    senna (SENOKOT) tablet 8 6 mg  1 tablet Oral HS Coleman Felix MD   8 6 mg at 09/13/18 2212        Laboratory:    Lab Results   Component Value Date    HGB 10 3 (L) 09/10/2018    HCT 30 1 (L) 09/10/2018    WBC 10 40 09/08/2018     Lab Results   Component Value Date    BUN 14 09/08/2018     09/08/2018    K 3 7 09/08/2018     09/08/2018    CREATININE 0 66 (L) 09/08/2018     Lab Results   Component Value Date    PROTIME 11 6 08/16/2018 INR 1 00 08/16/2018

## 2018-09-14 NOTE — PROGRESS NOTES
09/14/18 1328   Pain Assessment   Pain Assessment 0-10   Pain Score 9   Pain Location Knee   Pain Orientation Bilateral   Restrictions/Precautions   Precautions Fall Risk;Pain   Weight Bearing Restrictions Yes   RLE Weight Bearing Per Order WBAT   LLE Weight Bearing Per Order WBAT   ROM Restrictions No   Transfer Bed/Chair/Wheelchair   Limitations Noted In Balance; Endurance;Pain Management;LE Strength   Adaptive Equipment Roller Walker   Sit to Stand Supervision   Stand to Sit Supervision   Supine to Sit Supervision   Sit to Supine Supervision   Bed, Chair, Wheelchair Transfer (FIM) 5 - Patient requires supervision/monitoring   Functional Standing Tolerance   Time one 2 minute trial, one 1 5 minute trial while key matching exercise   Transfers   Sit to Stand 5  Supervision   Stand to Sit 5  Supervision   Therapeutic Exercise - ROM   UE-ROM Yes   ROM- Right Upper Extremities   RUE ROM Comment arm bike 2 5-minute trials; built pipe construction using picture as guide   ROM - Left Upper Extremities    LUE ROM Comment arm bike 2 5-minute trials; built pipe construction using picture as guide   Exercise Tools   Exercise Tools (arm bike)   Cognition   Overall Cognitive Status WFL   Arousal/Participation Alert; Responsive; Cooperative   Attention Within functional limits   Orientation Level Oriented X4   Memory Within functional limits   Following Commands Follows all commands and directions without difficulty   Additional Activities   Additional Activities Comments practice tub/shower transfer using shower chair   Activity Tolerance   Activity Tolerance Patient limited by pain   Assessment   Treatment Assessment Pt agreeable to OT session this PM  Received lying supine in bed  Overall supervision for transfers  Propel self to OT room in w/c using arms  Arm bike 2 5-minute trials  Built pipe construction using picture as guide while seated   Requested something under feet on which to rest feet; OT provided small stool, pt reported this provided relief  Stood to place keys on matching hangers, one 2 minute and one 1 5 minute trial  RW used for support  Rehearsed tub/shower transfer; pt overall contact guard  OT recommended pt purchase tub bench for safety and ease, however pt wants shower chair instead because a tub bench "would get water everywhere" from sticking out of the tub  Discussed further AE needs with pt, he reports having or is in the process of ordering everything else  Pt would benefit from continued skilled OT services in order to maximize functional transfers/mobility, activity/standing tolerance, LE ROM/strength, and independence in self care  Prognosis Good   Problem List Decreased strength;Decreased range of motion;Decreased endurance; Impaired balance;Decreased mobility;Orthopedic restrictions;Pain   Plan   Treatment/Interventions ADL retraining;Functional transfer training;LE strengthening/ROM; Therapeutic exercise; Endurance training;Patient/family training;Equipment eval/education; Compensatory technique education;OT   Progress Progressing toward goals   OT Therapy Minutes   OT Time In 1328   OT Time Out 1445   OT Total Time (minutes) 77   OT Mode of treatment - Individual (minutes) 43   OT Mode of treatment - Concurrent (minutes) 34   Therapy Time missed   Time missed?  No

## 2018-09-14 NOTE — NURSING NOTE
Patient out of bed with assist times one with walker  Continues with pain in bilateral knees and medicated for same  Incisions clean, dry, intact with edema surrounding  Compliant with SCD's while in bed  Will continue to monitor and follow plan of care

## 2018-09-14 NOTE — PROGRESS NOTES
09/14/18 0705   Pain Assessment   Pain Assessment 0-10   Pain Score 8   Pain Location Knee   Pain Orientation Bilateral   Restrictions/Precautions   Precautions Fall Risk;Pain   Weight Bearing Restrictions Yes   RLE Weight Bearing Per Order WBAT   LLE Weight Bearing Per Order WBAT   ROM Restrictions No   Eating Assessment   Food To Mouth Yes   Able To Cut Yes   Positioning Upright   Meal Assessed Breakfast   Opens Packages Yes   Eating (FIM) 7 - Patient completely independent   Grooming   Able To Initiate Tasks; Wash/Dry Face;Brush/Clean Teeth;Wash/Dry Hands   Limitation Noted In Safety   Grooming (FIM) 5 - [place] sets up supplies or applies device   Bathing   Assessed Bath Style Sponge Bath   Anticipated D/C Bath Style Shower;Sponge Bath   Able to AwesomePiece No   Able to Wash/Rinse/Dry (body part) Left Arm;Right Arm;L Upper Leg;R Upper Leg;L Lower Leg/Foot;R Lower Leg/Foot;Chest;Abdomen;Perineal Area; Buttocks   Limitations Noted in Balance; Endurance;ROM;Safety   Positioning Standing;Seated   Adaptive Equipment Longhand Sponge   Bathing (FIM) 5 - Patient requires supervision/monitoring but completes 10/10 parts   Dressing/Undressing Clothing   Remove UB Clothes Pullover Shirt   Remove LB Clothes Shorts;Socks   [address] LB Clothes Shorts;Socks  (first donned brief and pants but changed to shorts)   Limitations Noted In Balance; Endurance; Safety;ROM   Adaptive Equipment Dressing Stick; Sock Aide   Positioning Sit Edge Of Bed   Findings pt continues to be skilled with using AE but gradually requires less of it to dress LB   UB Dressing (FIM) 6 - Patient requires assistive device/extra time/safety concerns but completes independently   LB Dressing (FIM) 5 - Patient requires supervision/monitoring   Transfer Bed/Chair/Wheelchair   Limitations Noted In Balance; Endurance;Pain Management;LE Strength   Adaptive Equipment Roller Walker   Sit to [employer]   Stand to [place] Supine to Sit Supervision   Bed, Chair, Wheelchair Transfer (FIM) 5 - Patient requires supervision/monitoring   Functional Standing Tolerance   Time approx 4 minutes while washing and dressing LB   Transfers   Sit to Stand 5  Supervision   Stand to Sit 5  Supervision   Cognition   Overall Cognitive Status [facility]   Arousal/Participation Alert; Responsive; Cooperative   Attention Within functional limits   Orientation Level Oriented X4   Memory Within functional limits   Following Commands Follows all commands and directions without difficulty   Assessment   Treatment Assessment Pt agreeable to OT ADL session this AM  Received lying supine in bed  Current LOF and details listed in respective sessions  Pt demonstrating great improvements with bilat knee pain management and activity tolerance  Requiring less use of AE during LB dressing  Left sitting on EOB eating breakfast  Schedule to make up remaining time this afternoon  Pt would benefit from continued skilled OT services in order to maximize independence in self care, functional transfers, activity tolerance, and LB ROM/strength,   Prognosis Good   Problem List Decreased strength;Decreased range of motion;Decreased endurance; Impaired balance;Decreased mobility;Orthopedic restrictions;Pain   Plan   Treatment/Interventions ADL retraining;Functional transfer training;LE strengthening/ROM; Therapeutic exercise; Endurance training;Patient/family training;Equipment eval/education; Compensatory technique education;OT   Progress Progressing toward goals   OT Therapy Minutes   OT Time In 0705   OT Time Out 0745   OT Total Time (minutes) 40   OT Mode of treatment - Individual (minutes) 40   Therapy Time missed   Time missed?  No

## 2018-09-14 NOTE — PROGRESS NOTES
09/14/18 1230   Pain Assessment   Pain Assessment 0-10   Pain Score 8   Pain Type Surgical pain   Pain Location Knee   Pain Orientation Bilateral   Restrictions/Precautions   Precautions Fall Risk;Pain   Cognition   Overall Cognitive Status WFL   Arousal/Participation Alert; Responsive; Cooperative   Attention Within functional limits   Orientation Level Oriented X4   Memory Within functional limits   Following Commands Follows all commands and directions without difficulty   Subjective   Subjective I am ready to go   Transfer Bed/Chair/Wheelchair   Limitations Noted In Balance; Coordination; Endurance;Pain Management;LE Strength   Adaptive Equipment Roller Walker   Stand Pivot Modified Independent   Sit to Stand Modified Independent   Stand to Sit Modified Independent   Supine to Sit Modified Independent   Sit to Supine Modified Independent   All Transfer Modified Independent   Bed, Chair, Wheelchair Transfer (FIM) 6 - Patient requires assistive device/extra time/safety concerns but completes independently   Ambulation   Primary Discharge Mode of Locomotion Walk   Walk Assist Level Supervision   Gait Pattern Antalgic; Inconsistant Yomaira   Assist Device Roller Walker   Distance Walked (feet) 61 ft  (Pt amb 61' and 48' with RW )   Limitations Noted In Balance; Endurance;Strength   Findings level and carpet   Walking (FIM) 2 - Patient ambulates between 50 - 149 feet regardless of assist/device/set up   Stairs   Type Stairs   # of Steps 5   Assist Devices Bilateral Rail   Findings CGA   Stairs (FIM) 2 - Patient goes up and down 4 - 11 stairs regardless of assist/device/setup   Therapeutic Interventions   Strengthening seated ther ex 30 reps   Other gait training and community re-ed   Assessment   Treatment Assessment Pt increased amb distance today up to 64' with RW and supervision; Pt is Mod I for transfers;  Pt is progressing well in therapy; Up/down 5 steps with bilateral hand hail today and CGA; Pt performed seated ther ex and self stretches to increase ROM and strength B LE   PT Barriers   Physical Impairment Decreased strength;Decreased range of motion;Decreased endurance; Impaired balance;Decreased mobility   Functional Limitation Walking;Transfers;Standing;Stair negotiation   Plan   Treatment/Interventions Functional transfer training;LE strengthening/ROM; Elevations; Therapeutic exercise;Gait training   Progress Progressing toward goals   PT Therapy Minutes   PT Time In 1230   PT Time Out 1330   PT Total Time (minutes) 60   PT Mode of treatment - Individual (minutes) 30   PT Mode of treatment - Concurrent (minutes) 30   PT Mode of treatment - Group (minutes) 0   PT Mode of treatment - Co-treat (minutes) 0   PT Mode of Teatment - Total time(minutes) 60 minutes   Therapy Time missed   Time missed?  No

## 2018-09-15 RX ORDER — OXYCODONE HYDROCHLORIDE 5 MG/1
10 TABLET ORAL EVERY 4 HOURS PRN
Status: DISCONTINUED | OUTPATIENT
Start: 2018-09-15 | End: 2018-09-17 | Stop reason: HOSPADM

## 2018-09-15 RX ORDER — SODIUM PHOSPHATE, DIBASIC AND SODIUM PHOSPHATE, MONOBASIC 7; 19 G/133ML; G/133ML
1 ENEMA RECTAL ONCE
Status: DISCONTINUED | OUTPATIENT
Start: 2018-09-15 | End: 2018-09-17 | Stop reason: HOSPADM

## 2018-09-15 RX ADMIN — OXYCODONE HYDROCHLORIDE 10 MG: 5 TABLET ORAL at 16:57

## 2018-09-15 RX ADMIN — FONDAPARINUX SODIUM 2.5 MG: 2.5 INJECTION, SOLUTION SUBCUTANEOUS at 08:56

## 2018-09-15 RX ADMIN — LIDOCAINE 2 PATCH: 50 PATCH TOPICAL at 08:56

## 2018-09-15 RX ADMIN — OXYCODONE HYDROCHLORIDE 15 MG: 5 TABLET ORAL at 02:59

## 2018-09-15 RX ADMIN — OXYCODONE HYDROCHLORIDE 10 MG: 5 TABLET ORAL at 12:06

## 2018-09-15 RX ADMIN — Medication 1 TABLET: at 08:56

## 2018-09-15 RX ADMIN — Medication 325 MG: at 16:57

## 2018-09-15 RX ADMIN — POLYETHYLENE GLYCOL 3350 17 G: 17 POWDER, FOR SOLUTION ORAL at 08:56

## 2018-09-15 RX ADMIN — FAMOTIDINE 20 MG: 20 TABLET, FILM COATED ORAL at 08:56

## 2018-09-15 RX ADMIN — SENNOSIDES 8.6 MG: 8.6 TABLET, FILM COATED ORAL at 21:16

## 2018-09-15 RX ADMIN — DOCUSATE SODIUM 100 MG: 100 CAPSULE, LIQUID FILLED ORAL at 08:56

## 2018-09-15 RX ADMIN — Medication 325 MG: at 07:35

## 2018-09-15 RX ADMIN — DOCUSATE SODIUM 100 MG: 100 CAPSULE, LIQUID FILLED ORAL at 17:00

## 2018-09-15 RX ADMIN — OXYCODONE HYDROCHLORIDE 15 MG: 5 TABLET ORAL at 07:35

## 2018-09-15 RX ADMIN — OXYCODONE HYDROCHLORIDE 10 MG: 5 TABLET ORAL at 21:16

## 2018-09-15 NOTE — NURSING NOTE
Pt called for nurse to administer pain medication  Pt  Is requesting 15mg q 4hrs  We discussed possibly taking the 10mg at times when the pain is not severe  We also discussed the need to speak with the dr about his concerns of returning home w/o same orders/medication  Reassured pt we want him to feel comfortable and help him with pain control  Pt writes time down in book when med is given and I reminded pt that he needs to ask for the medication  The nurses will not bring it every 4 hrs routinely  I let pt know that if he is sleeping we do not want to disturb/wake him to administer med etc  Pt understood

## 2018-09-15 NOTE — ASSESSMENT & PLAN NOTE
S/P B/L TKA WBAT with pain    PT/OT  Arixtra DVT prophy  Pepcid GI prophy  Lidoderm  Taper OxyIR    Robaxin for spasms  Keflex completed

## 2018-09-15 NOTE — PROGRESS NOTES
Progress Note - Luli Savage 1945, 68 y o  male MRN: [id number]    Unit/Bed#: [facility] 221-01 Encounter: [id number]    Primary Care Provider: Michael Martínez   Date and time admitted to hospital: 9/7/2018  1:55 PM        * Primary osteoarthritis of both knees   Assessment & Plan    S/P B/L TKA WBAT with pain    PT/OT  Arixtra DVT prophy  Pepcid GI prophy  Lidoderm  Taper OxyIR  Robaxin for spasms  Keflex completed        Acute blood loss as cause of postoperative anemia   Assessment & Plan    Post op anemia stable    FeSO4            Gastroesophageal reflux disease without esophagitis   Assessment & Plan    GERD    Pepcid            Vitals:  Temp:  [95 5 °F (35 3 °C)-97 4 °F (36 3 °C)] 95 5 °F (35 3 °C)  HR:  [83-91] 91  Resp:  [16] 16  BP: (138-152)/(84-92) 152/84    Physical Exam:  General: alert, no apparent distress, cooperative and comfortable  HENMT: Head: Normal, normocephalic, atraumatic  Eye: Normal external eye, conjunctiva, lids   Ears: Normal external ears  Nose: Normal external nose, mucus membranes  COR: [id number]  Pulmonary: chest expansion normal, no retractions, no accessory muscle usage, no wheezes, rales, or rhonchi   Abdomen: soft, nontender, nondistended, no masses or organomegaly  Skin/Extremity: b/l knee incisions C/D/Staples intact, Mod edema, resolving ecchymosis  Calves soft, NT  Neurologic: Awake alert orientedx3  Psych: normal mood, behavior, speech, dress, and thought processes  Musculoskeletal: limited rom b/l knees  Patient is progressing with ADLs and functional mobility   Overall supervision for transfers        Current Facility-Administered Medications   Medication Dose Route Frequency Provider Last Rate Last Dose    acetaminophen (TYLENOL) tablet 650 mg  650 mg Oral Q6H PRN Isabel Taylor MD   650 mg at 09/12/18 0743    aluminum-magnesium hydroxide-simethicone (MYLANTA) 200-200-20 mg/5 mL oral suspension 30 mL  30 mL Oral Q6H PRN Isabel Taylor MD   30 mL at 09/13/18 0729    BIOTENE MOISTURIZING MOUTH SOLN 1 spray  1 spray Buccal 4x Daily PRN Soo Fam MD   1 spray at 09/13/18 0927    bisacodyl (DULCOLAX) rectal suppository 10 mg  10 mg Rectal Daily PRN Soo Fam MD        [employer] LEG CRAMPS PAIN RELIEF TABS 3 tablet  3 tablet Sublingual Q4H PRN Soo Fam MD        docusate sodium (COLACE) capsule 100 mg  100 mg Oral BID Soo Fam MD   100 mg at 09/15/18 0856    famotidine (PEPCID) tablet 20 mg  20 mg Oral Daily Soo Fam MD   20 mg at 09/15/18 0856    ferrous sulfate tablet 325 mg  325 mg Oral BID With Meals Soo Fam MD   325 mg at 09/15/18 0735    fondaparinux (ARIXTRA) subcutaneous injection 2 5 mg  2 5 mg Subcutaneous Daily Soo Fam MD   2 5 mg at 09/15/18 0856    lidocaine (LIDODERM) 5 % patch 2 patch  2 patch Transdermal Daily Soo Fam MD   2 patch at 09/15/18 0856    methocarbamol (ROBAXIN) tablet 500 mg  500 mg Oral Q6H PRN Soo Fam MD   500 mg at 09/13/18 1107    multivitamin-minerals (CENTRUM) tablet 1 tablet  1 tablet Oral Daily Soo Fam MD   1 tablet at 09/15/18 0856    oxyCODONE (ROXICODONE) IR tablet 10 mg  10 mg Oral Q4H PRN Soo Fam MD   10 mg at 09/13/18 1044    oxyCODONE (ROXICODONE) IR tablet 15 mg  15 mg Oral Q4H PRN Soo Fam MD   15 mg at 09/15/18 0735    polyethylene glycol (MIRALAX) packet 17 g  17 g Oral Daily Soo Fam MD   17 g at 09/15/18 0856    senna (SENOKOT) tablet 8 6 mg  1 tablet Oral HS Soo Fam MD   8 6 mg at 09/14/18 2136    sodium phosphate-biphosphate (FLEET) enema 1 enema  1 enema Rectal Once Soo Fam MD            Laboratory:    Lab Results   Component Value Date    HGB 10 3 (L) 09/10/2018    HCT 30 1 (L) 09/10/2018    WBC 10 40 09/08/2018     Lab Results   Component Value Date    BUN 14 09/08/2018     09/08/2018    K 3 7 09/08/2018     09/08/2018    CREATININE 0 66 (L) 09/08/2018 Lab Results   Component Value Date    PROTIME 11 6 08/16/2018    INR 1 00 08/16/2018

## 2018-09-15 NOTE — NURSING NOTE
Staff asked pt approximately 15 minutes ago if he wanted to ambulate  Pt stated he did not have therapy and he did not want to get OOB  Pt was not OOB all day except to use the toilet  Staff explained this is a 24/7 rehab therapy program and not just when therapy is here  Pt stated he would walk after he eats his dinner  Pt did ambulate at this time  The first time the patient used his walker and walked out of his room, through the carpet by the old nurses station, and into the therapy gym  He was supervision  Pt then needed to rest  He then walked back to his room with the walker and was supervision

## 2018-09-15 NOTE — NURSING NOTE
Patient remains in bed all shift accept when ambulates to bathoom  Patient able to ambulate with assist times one with walker and perform bathroom hygiene  Patient consistently taking oxy IR every 4 hours for pain in bilateral knees  Patient educated on side effects of constipation and dependency for pain medication  Patient states his pain remains at a #8  Incisions clean, dry, intact to bilateral knees with staples  Compliant with SCD's  Will continue to monitor and follow plan of care

## 2018-09-16 RX ADMIN — OXYCODONE HYDROCHLORIDE 10 MG: 5 TABLET ORAL at 21:33

## 2018-09-16 RX ADMIN — FONDAPARINUX SODIUM 2.5 MG: 2.5 INJECTION, SOLUTION SUBCUTANEOUS at 08:14

## 2018-09-16 RX ADMIN — SENNOSIDES 8.6 MG: 8.6 TABLET, FILM COATED ORAL at 21:32

## 2018-09-16 RX ADMIN — Medication 325 MG: at 07:43

## 2018-09-16 RX ADMIN — DOCUSATE SODIUM 100 MG: 100 CAPSULE, LIQUID FILLED ORAL at 17:06

## 2018-09-16 RX ADMIN — ACETAMINOPHEN 650 MG: 325 TABLET ORAL at 19:30

## 2018-09-16 RX ADMIN — Medication 1 TABLET: at 08:14

## 2018-09-16 RX ADMIN — FAMOTIDINE 20 MG: 20 TABLET, FILM COATED ORAL at 08:14

## 2018-09-16 RX ADMIN — DOCUSATE SODIUM 100 MG: 100 CAPSULE, LIQUID FILLED ORAL at 08:14

## 2018-09-16 RX ADMIN — POLYETHYLENE GLYCOL 3350 17 G: 17 POWDER, FOR SOLUTION ORAL at 08:13

## 2018-09-16 RX ADMIN — OXYCODONE HYDROCHLORIDE 10 MG: 5 TABLET ORAL at 12:11

## 2018-09-16 RX ADMIN — Medication 325 MG: at 17:06

## 2018-09-16 RX ADMIN — LIDOCAINE 2 PATCH: 50 PATCH TOPICAL at 08:14

## 2018-09-16 RX ADMIN — OXYCODONE HYDROCHLORIDE 10 MG: 5 TABLET ORAL at 17:06

## 2018-09-16 RX ADMIN — OXYCODONE HYDROCHLORIDE 10 MG: 5 TABLET ORAL at 02:09

## 2018-09-16 RX ADMIN — OXYCODONE HYDROCHLORIDE 10 MG: 5 TABLET ORAL at 07:43

## 2018-09-16 NOTE — PROGRESS NOTES
Patient has been in bed since start of shift at 1900, pleasant, B/L knee incisions well approximated with no S/S of infection and intact staples, medicated for c/o b/l knee pain, resting in bed at times with eyes closed and at times he is watching TV, no distress seen, call bell within easy reach, will monitor frequently

## 2018-09-16 NOTE — NURSING NOTE
Patient remained in bed most of shift  Able to ambulate to bathroom with walker and uses urinal without difficulty  Patient ambulated once in hallway with PCA and walker  Voices complaints of pain #8 out of 10 and requests pain medication every 4 hours around the clock  Encouraged patient to try to space out time in between doses if possible  Wife was present on unit and voiced concerns with patient taking pain medication frequently  She feels patient will continue to want to take in after discharge and fears he will have a problem post discharge stopping taking pain medications  Discussed with wife that she can speak with Dr Radha Pederson tomorrow before discharge about his medications he will be discharge with  Encouraged wife to monitor patients intake of pain medications at home and ensure it remains in time frame of him being able to have them  Also encouraged wife to voice concerns with her  in regards to same  Will continue to monitor and offer any education or advice to all parties involved if need be

## 2018-09-17 VITALS
DIASTOLIC BLOOD PRESSURE: 82 MMHG | SYSTOLIC BLOOD PRESSURE: 144 MMHG | OXYGEN SATURATION: 94 % | WEIGHT: 234.2 LBS | HEART RATE: 91 BPM | RESPIRATION RATE: 16 BRPM | HEIGHT: 70 IN | TEMPERATURE: 98.2 F | BODY MASS INDEX: 33.53 KG/M2

## 2018-09-17 PROCEDURE — 97110 THERAPEUTIC EXERCISES: CPT

## 2018-09-17 PROCEDURE — 97537 COMMUNITY/WORK REINTEGRATION: CPT

## 2018-09-17 PROCEDURE — 97535 SELF CARE MNGMENT TRAINING: CPT

## 2018-09-17 PROCEDURE — 97530 THERAPEUTIC ACTIVITIES: CPT

## 2018-09-17 PROCEDURE — 97116 GAIT TRAINING THERAPY: CPT

## 2018-09-17 RX ADMIN — LIDOCAINE 2 PATCH: 50 PATCH TOPICAL at 08:31

## 2018-09-17 RX ADMIN — DOCUSATE SODIUM 100 MG: 100 CAPSULE, LIQUID FILLED ORAL at 08:31

## 2018-09-17 RX ADMIN — FAMOTIDINE 20 MG: 20 TABLET, FILM COATED ORAL at 08:30

## 2018-09-17 RX ADMIN — OXYCODONE HYDROCHLORIDE 10 MG: 5 TABLET ORAL at 03:43

## 2018-09-17 RX ADMIN — FONDAPARINUX SODIUM 2.5 MG: 2.5 INJECTION, SOLUTION SUBCUTANEOUS at 08:31

## 2018-09-17 RX ADMIN — Medication 1 TABLET: at 08:30

## 2018-09-17 RX ADMIN — Medication 325 MG: at 08:30

## 2018-09-17 RX ADMIN — OXYCODONE HYDROCHLORIDE 10 MG: 5 TABLET ORAL at 08:31

## 2018-09-17 RX ADMIN — ACETAMINOPHEN 650 MG: 325 TABLET ORAL at 11:26

## 2018-09-17 NOTE — DISCHARGE INSTRUCTIONS
Fondaparinux (By injection)   Fondaparinux ([id number])  Treats blood clots and prevents them from forming after hip, knee, or stomach surgery  This medicine is a blood thinner  Brand Name(s): Arixtra, Fondaparinux Sodium Novaplus   There may be other brand names for this medicine  When This Medicine Should Not Be Used: This medicine is not right for everyone  Do not use it if you had an allergic reaction to fondaparinux  How to Use This Medicine:   Injectable  · Your doctor will prescribe your exact dose and tell you how often it should be given  This medicine is given as a shot under your skin  · A nurse or other health provider will give you this medicine  · You may be taught how to give your medicine at home  Make sure you understand all instructions before giving yourself an injection  Do not use more medicine or use it more often than your doctor tells you to  · Alternate sides of your abdomen for each injection  · After you remove the needle guard, do not let the needle touch anything before you inject the medicine  This will help prevent infection  · Read and follow the patient instructions that come with this medicine  Talk to your doctor or pharmacist if you have any questions  · Missed dose: Take a dose as soon as you remember  If it is almost time for your next dose, wait until then and take a regular dose  Do not take extra medicine to make up for a missed dose  · If you store this medicine at home, keep it at room temperature, away from heat and direct light  · Throw away used needles in a hard, closed container that the needles cannot poke through  Keep this container away from children and pets  Drugs and Foods to Avoid:   Ask your doctor or pharmacist before using any other medicine, including over-the-counter medicines, vitamins, and herbal products  · Some medicines can affect how fondaparinux works   Tell your doctor if you are using an NSAID pain or arthritis medicine (such as aspirin, ibuprofen, naproxen) or other blood thinners (such as clopidogrel, warfarin)  Warnings While Using This Medicine:   · Tell your doctor if you are pregnant or breastfeeding, or if you have kidney disease, liver disease, any bleeding problems, high blood pressure, diabetic retinopathy, a stomach ulcer, or a history of stroke or heart infection  Tell your doctor if you recently had brain, back, or eye surgery  Tell your doctor if you have an allergy to latex  · This medicine may cause the following problems:  ¨ Increased risk of bleeding  ¨ Risk of spinal or epidural blood clots after spinal procedures  · Tell any doctor or dentist who treats you that you are using this medicine  You may need to stop using this medicine several days before you have surgery or medical tests  · Your doctor will do lab tests at regular visits to check on the effects of this medicine  Keep all appointments  · Keep all medicine out of the reach of children  Never share your medicine with anyone  Possible Side Effects While Using This Medicine:   Call your doctor right away if you notice any of these side effects:  · Allergic reaction: Itching or hives, swelling in your face or hands, swelling or tingling in your mouth or throat, chest tightness, trouble breathing  · Blood in your urine or stools  · Change in how much or how often you urinate  · Loss of bowel or bladder control  · Numbness or tingling in your legs, back pain  · Tiny red dots on the skin, especially on the lower legs  · Unexplained nosebleeds  · Unusual bleeding, bruising, or weakness  If you notice these less serious side effects, talk with your doctor:   · Mild bleeding, itching, or rash where the shot was given  If you notice other side effects that you think are caused by this medicine, tell your doctor  Call your doctor for medical advice about side effects   You may report side effects to FDA at 0-153-FDA-7576  © 2017 [facility], [place] [address] [facility] is for End User's use only and may not be sold, redistributed or otherwise used for commercial purposes  The above information is an  only  It is not intended as medical advice for individual conditions or treatments  Talk to your doctor, nurse or pharmacist before following any medical regimen to see if it is safe and effective for you  Knee Replacement   WHAT YOU NEED TO KNOW:   Knee replacement is surgery to replace all or part of your knee joint  It is also called knee arthroplasty  It is normal to have some stiffness and pain after surgery  Your pain and stiffness should get better with exercise  DISCHARGE INSTRUCTIONS:   Call 911 for any of the following:   · You feel lightheaded, short of breath, and have chest pain  · You cough up blood  Seek care immediately if:   · Your leg feels warm, tender, and painful  It may look swollen and red  · You cannot walk or move your knee  · Blood soaks through your bandage  · Your incision comes apart  · Your incision is red, swollen, or draining pus  Contact your healthcare provider if:   · You have a fever or chills  · You have trouble moving or bending your knee  · You have new knee pain or pain that does not get better after medicine  · You have questions or concerns about your condition or care  Medicines: You may  need any of the following:  · Prescription pain medicine  may be given  Ask your healthcare provider how to take this medicine safely  Some prescription pain medicines contain acetaminophen  Do not take other medicines that contain acetaminophen without talking to your healthcare provider  Too much acetaminophen may cause liver damage  Prescription pain medicine may cause constipation  Ask your healthcare provider how to prevent or treat constipation  · NSAIDs , such as ibuprofen, help decrease swelling, pain, and fever   This medicine is available with or without a doctor's order  NSAIDs can cause stomach bleeding or kidney problems in certain people  If you take blood thinner medicine, always ask your healthcare provider if NSAIDs are safe for you  Always read the medicine label and follow directions  · Blood thinners    help prevent blood clots  Examples of blood thinners include heparin and warfarin  Clots can cause strokes, heart attacks, and death  The following are general safety guidelines to follow while you are taking a blood thinner:    ¨ Watch for bleeding and bruising while you take blood thinners  Watch for bleeding from your gums or nose  Watch for blood in your urine and bowel movements  Use a soft washcloth on your skin, and a soft toothbrush to brush your teeth  This can keep your skin and gums from bleeding  If you shave, use an electric shaver  Do not play contact sports  ¨ Tell your dentist and other healthcare providers that you take anticoagulants  Wear a bracelet or necklace that says you take this medicine  ¨ Do not start or stop any medicines unless your healthcare provider tells you to  Many medicines cannot be used with blood thinners  ¨ Tell your healthcare provider right away if you forget to take the medicine, or if you take too much  ¨ Warfarin  is a blood thinner that you may need to take  The following are things you should be aware of if you take warfarin  § Foods and medicines can affect the amount of warfarin in your blood  Do not make major changes to your diet while you take warfarin  Warfarin works best when you eat about the same amount of vitamin K every day  Vitamin K is found in green leafy vegetables and certain other foods  Ask for more information about what to eat when you are taking warfarin  § You will need to see your healthcare provider for follow-up visits when you are on warfarin  You will need regular blood tests  These tests are used to decide how much medicine you need      · Take your medicine as directed  Contact your healthcare provider if you think your medicine is not helping or if you have side effects  Tell him or her if you are allergic to any medicine  Keep a list of the medicines, vitamins, and herbs you take  Include the amounts, and when and why you take them  Bring the list or the pill bottles to follow-up visits  Carry your medicine list with you in case of an emergency  Therapies:   · A physical therapist  will teach you exercises to strengthen the muscles around your knee  The exercises will help decrease pain and swelling  Do your exercises as many times as directed  The therapist will show you how to move without damaging your knee  · An occupational therapist  will show you how to do daily activities safely  He or she may recommend assistive devices to help you dress, bathe, and  things  The assistive devices will help keep you from twisting and bending, which can damage your knee  Activity:   · Go up the stairs  by placing your non-operated leg on the step first  Then bring your operated leg to the same step  Repeat  · Go down stairs  by placing your operated leg down on the step first  Then bring your non-operated leg to the same step  Repeat  · Use assistive devices as directed  Your thigh muscles will be weak after surgery  Assistive devices will help you not fall  · Do not cross your legs for 6 weeks or as directed  Your risk for blood clots is greater when you cross your legs  You can also move your implant out of place  · Do light housekeeping duties  Ask your healthcare provider which duties would be okay for you to do  He or she may tell you it is okay to dust or do dishes  Avoid vacuuming, changing the bed, or any duty that has you reaching up, bending, or twisting  · Do not drive for at least 6 weeks  or until your healthcare provider says it is okay  · Do not play tennis, golf, or ski  until your healthcare provider says it is okay   These activities and contact sports can loosen your knee implant  Care for your incision area as directed:  Do not get the area wet until your healthcare provider says it is okay  Carefully wash the area with soap and water  Dry the area and put on new, clean bandages as directed  Change your bandages when they get wet or dirty  Do not put powders or lotions over the area  If you have strips of medical tape over the incision area, let them dry up and fall off on their own  If they do not fall off within 14 days, gently remove them  Check the area every day for signs of infection, such as swelling, redness, or pus  Self-care:   · Apply ice  on your knee for 15 to 20 minutes every hour or as directed  Use an ice pack, or put crushed ice in a plastic bag  Cover it with a towel  Ice helps prevent tissue damage and decreases swelling and pain  · Do not soak in a tub or pool  until your incision area is healed or your healthcare provider says it is okay  · Carry your ID card for your joint replacement at all times  All healthcare providers need to know about your joint replacement  You may need antibiotics before any procedure to prevent an infection of your new joint  The metal in your new joint may set off metal detectors  You will not be able to have an MRI because of the metal in your joint  Prevent falls:   · Remove all loose carpets and cords  These can cause you to trip and fall  · Use a shower bench or chair  when you take a shower to limit the time you are standing  · Use a toilet seat riser with arms  if your toilet seat is low  A toilet seat riser will help prevent bending or twisting your knee  · Know your limits  Start activities slowly and give yourself rest periods  Pain and swelling can increase when you do too much  Do not do an activity until your healthcare provider says you are ready    Follow up with your healthcare provider as directed:  Write down your questions so you remember to ask them during your visits  © 2017 [address] Information is for End User's use only and may not be sold, redistributed or otherwise used for commercial purposes  All illustrations and images included in CareNotes® are the copyrighted property of A D A M , Inc  or Jonny Childs  The above information is an  only  It is not intended as medical advice for individual conditions or treatments  Talk to your doctor, nurse or pharmacist before following any medical regimen to see if it is safe and effective for you

## 2018-09-17 NOTE — PROGRESS NOTES
09/17/18 1334   Pain Assessment   Pain Assessment 0-10   Pain Score 8   Pain Location Knee   Pain Orientation Bilateral   Restrictions/Precautions   Precautions Fall Risk   RLE Weight Bearing Per Order WBAT   LLE Weight Bearing Per Order WBAT   ROM Restrictions No   Therapeutic Excerise-Strength   UE Strength Yes   Right Upper Extremity- Strength   RUE Strength Comment 2x15 using 5# therabar shoulder flexion/extension, horizontal ABD/ADD   Left Upper Extremity-Strength   LUE Strength Comment 2x15 using 5# therabar shoulder flexion/extension, horizontal ABD/ADD   Exercise Tools   Exercise Tools (5# therabar)   Cognition   Overall Cognitive Status WFL   Arousal/Participation Alert; Responsive; Cooperative   Attention Within functional limits   Orientation Level Oriented X4   Memory Within functional limits   Following Commands Follows all commands and directions without difficulty   Additional Activities   Additional Activities Comments helped fill out d/c survey, helped acquire pt's items in bags for d/c   Activity Tolerance   Activity Tolerance Patient limited by pain   Assessment   Treatment Assessment Pt seen for OT session prior to d/c  Helped pt gather personal items in bags for d/c and fill out d/c survey  UE strengthening: 2x15 using 5# therabar shoulder flexion/extension, horizontal ABD/ADD  Pt reported severe bilat knee pain and completed exercises while seated EOB  Pt missed 19 minutes of OT today 2* pain during both sessions and d/c time  Recommendation   OT - OK to Discharge Yes   Discharge Summary Pt being d/c home today with wife  Pt has met set goals and made great progress during stay at [facility]  OT Therapy Minutes   OT Time In 1334   OT Time Out 1356   OT Total Time (minutes) 22   OT Mode of treatment - Individual (minutes) 22   Therapy Time missed   Time missed?  Yes   Amount of time missed 19   Reason for time missed (pt reported severe pain and requested to stop sessions)

## 2018-09-17 NOTE — PROGRESS NOTES
09/17/18 0938   Pain Assessment   Pain Assessment 0-10   Pain Score 7  (reported worst possible pain at end of OT)   Pain Location Knee   Pain Orientation Bilateral   Restrictions/Precautions   Precautions Fall Risk   RLE Weight Bearing Per Order WBAT   LLE Weight Bearing Per Order WBAT   ROM Restrictions No   QI: Eating   Assistance Needed Independent   Eating CARE Score 6   Eating Assessment   Eating (FIM) 7 - Patient completely independent   QI: Oral Hygiene   Assistance Needed Independent   Oral Hygiene CARE Score 6   Grooming   Able To Initiate Tasks; Acquire Items; Wash/Dry Face;Brush/Clean Teeth;Wash/Dry Hands   Limitation Noted In Strength   Grooming (FIM) 6 - Patient requires assistive device/extra time/safety concerns but completes independently   QI: Shower/Bathe Self   Assistance Needed Independent   Shower/Bathe Self CARE Score 6   Bathing   Assessed Bath Style Sponge Bath  (2* staples in bilat knees)   Anticipated D/C Bath Style Shower   Able to [employer] No   Able to Wash/Rinse/Dry (body part) Left Arm;Right Arm;L Upper Leg;R Upper Leg;L Lower Leg/Foot;R Lower Leg/Foot;Chest;Abdomen;Perineal Area; Buttocks   Limitations Noted in Balance; Endurance;ROM;Safety;Strength   Positioning Seated   Adaptive Equipment Longhand Sponge   Bathing (FIM) 6 - Patient requires assistive device/extra time/safety concerns but completes independently   Tub/Shower Transfer   Limitations Noted In Balance; Endurance;ROM;Safety;LE Strength   Adaptive Equipment Grab Bars;Seat with out Back   Assessed Shower   Shower Transfer (FIM) 5 - Patient requires supervision/monitoring   QI: Upper Body Dressing   Assistance Needed Independent   Upper Body Dressing CARE Score 6   QI: Lower Body Dressing   Assistance Needed Adaptive equipment   Comment sock carlos, dressing stick, reacher   Lower Body Dressing CARE Score 6   QI: Putting On/Taking Off Footwear   Assistance Needed Adaptive equipment   Comment sock carlos   Putting On/Taking Off Footwear CARE Score 6   QI: Picking Up Object   Assistance Needed Independent; Adaptive equipment   Comment reacher if needed   Picking Up Object CARE Score 6   Dressing/Undressing Clothing   Remove UB Clothes Pullover Shirt   Remove LB Clothes Shorts; Undergarment;Socks   [address]; Undergarment;Socks   Limitations Noted In Balance; Endurance; Safety;Strength   Adaptive Equipment Reacher;Dressing Stick; Sock Aide   Positioning Sit Edge Of Bed   UB Dressing (FIM) 7 - No helper, safely, timely and completes all tasks independently   LB Dressing (FIM) 6 - Patient requires assistive device/extra time/safety concerns but completes independently   Transfer Bed/Chair/Wheelchair   Limitations Noted In Balance; Endurance;Pain Management;LE Strength   Adaptive Equipment Roller Walker   Sit to Stand (mod I)   Stand to Sit (mod I)   Supine to Sit (mod I)   Sit to Supine (mod I)   Bed, Chair, Wheelchair Transfer (FIM) 6 - Patient requires assistive device/extra time/safety concerns but completes independently   QI: [address]  Score 6   Toileting   Able to Pull Clothing down yes, up yes  Able to Manage Clothing Closures Yes   Manage Hygiene Bladder; Bowel   Limitations Noted In Balance;ROM;Safety;LE Strength   Adaptive Equipment Grab Bar   Toileting (FIM) 6 - Patient requires assistive device/extra time/safety concerns but completes independently   QI: Toilet Transfer   Assistance Needed Supervision   Assistance Provided by [place] No physical assistance   Toilet Transfer CARE Score 4   Toilet Transfer   Surface Assessed Standard Toilet   Transfer Technique Standard   Limitations Noted In Balance; Endurance;ROM;Safety;LE Strength   Adaptive Equipment Grab Bar   Toilet Transfer (FIM) 5 - Patient requires supervision/monitoring   Functional Standing Tolerance   Time 3 trials during grocery shopping activity: one approx 3 minutes, one approx 5 minutes, one approx 6 minutes   Transfers   Sit to Stand 6  Modified independent   Stand to Sit 6  Modified independent   Toilet transfer 5  Supervision   Therapeutic Exercise - ROM   UE-ROM Yes   ROM- Right Upper Extremities   RUE ROM Comment arm bike 5 minutes   ROM - Left Upper Extremities    LUE ROM Comment arm bike 5 minutes   Therapeutic Excerise-Strength   UE Strength Yes   Right Upper Extremity- Strength   RUE Strength Comment searched for and retrieved small objects in yellow theraputty   Left Upper Extremity-Strength   LUE Strength Comment searched for and retrieved small objects in yellow theraputty   Exercise Tools   Exercise Tools (arm bike, yellow theraputty)   Cognition   Overall Cognitive Status WFL   Arousal/Participation Alert; Responsive; Cooperative   Attention Within functional limits   Orientation Level Oriented X4   Memory Within functional limits   Following Commands Follows all commands and directions without difficulty   Additional Activities   Additional Activities Comments grocery shopping activity: listed 3 items at a time to search for, retrieve, and place in bag attached to walker   Activity Tolerance   Activity Tolerance Patient limited by pain   Assessment   Treatment Assessment Pt agreeable to OT session this AM  Received lying supine in bed  Current LOF's for ADLs listed in respective sections  Pt did not shower 2* recommendation from nursing to not get staples wet; staples being removed later this week  Pt instead sponge bathed  Activity tolerance: arm bike 5 minutes, grocery shopping while standing with RW in front and retrieve items to place in bag  Pt reported severe bilat knee pain after this activity; completed intrinsic hand strengthening at table to give knees a break  While OT was setting up next activity, pt requested to go back to his room and lay down 2* severe knee pain  Plans to make up time later in the afternoon   Pt would benefit from continued skilled OT services in order to maximize independence in self care, functional transfers/mobility, activity tolerance, and ROM/strength  Prognosis Good   Problem List Decreased strength;Decreased range of motion;Decreased endurance; Impaired balance;Decreased mobility;Orthopedic restrictions;Pain   Plan   Treatment/Interventions ADL retraining;Functional transfer training;LE strengthening/ROM; Therapeutic exercise; Endurance training;Patient/family training;Equipment eval/education; Compensatory technique education;OT   Progress Progressing toward goals   OT Therapy Minutes   OT Time In 0938   OT Time Out 1057   OT Total Time (minutes) 79   OT Mode of treatment - Individual (minutes) 79   Therapy Time missed   Time missed?  No

## 2018-09-17 NOTE — CASE MANAGEMENT
DC instructions reviewed with Pt & Pt's spouse  Pt being [address] home today, being transported by family via car, which tx team has determined to be a safe mode of transport  FU appts indicated on DC instructions, to be scheduled by Pt per scheduling preferences  Ortho FU arranged for tomorrow for suture removal  HHC arranged with [facility] (Nsg, PT, OT)  IMM reviewed & signed

## 2018-09-17 NOTE — PROGRESS NOTES
09/17/18 1230   Pain Assessment   Pain Assessment 0-10   Pain Score 5   Pain Type Surgical pain   Pain Location Knee   Pain Orientation Bilateral   Restrictions/Precautions   Precautions Fall Risk   Weight Bearing Restrictions Yes   RUE Weight Bearing Per Order WBAT   LUE Weight Bearing Per Order WBAT   RLE Weight Bearing Per Order WBAT   LLE Weight Bearing Per Order WBAT   ROM Restrictions No   Cognition   Overall Cognitive Status WFL   Arousal/Participation Alert; Responsive; Cooperative   Attention Within functional limits   Orientation Level Oriented X4   Memory Within functional limits   Following Commands Follows all commands and directions without difficulty   Subjective   Subjective i am going home today   QI: Roll Left and Right   Assistance Needed Independent   Assistance Provided by Lovely No physical assistance   Roll Left and Right CARE Score 6   QI: Sit to [address] Provided by Lovely No physical assistance   Sit to Lying CARE Score 6   QI: Lying to Sitting on Side of Bed   Assistance Needed Independent   Assistance Provided by Lovely No physical assistance   Lying to Sitting on Side of Bed CARE Score 6   QI: Sit to [address] Provided by Lovely No physical assistance   Sit to Stand CARE Score 6   QI: Chair/Bed-to-Chair Transfer   Assistance Needed Independent   Assistance Provided by Lovely No physical assistance   Chair/Bed-to-Chair Transfer CARE Score 6   Transfer Bed/Chair/Wheelchair   Limitations Noted In Balance; Coordination; Endurance;Pain Management;LE Strength   Adaptive Equipment Roller Walker   Stand Pivot Modified Independent   Sit to Stand Modified Independent   Stand to Sit Modified Independent   Supine to Sit Modified Independent   Sit to Supine Modified Independent   All Transfer Modified Independent   Bed, Chair, Wheelchair Transfer (FIM) 6 - Patient requires assistive device/extra time/safety concerns but completes independently   QI: [address]  Provided by Willis No physical assistance   Walk 10 Feet CARE Score 6   QI: Walk 50 Feet with Two [address] Provided by Willis No physical assistance   Walk 50 Feet with Two Turns CARE Score 6   QI: Walking 10 Feet on [address] Provided by Willis No physical assistance   Walking 10 Feet on Uneven Surfaces CARE Score 6   Ambulation   Does the patient walk? 2  Yes   Primary Discharge Mode of Locomotion Walk   Walk Assist Level Modified Independent   Gait Pattern Antalgic; Inconsistant Yomaira   Assist Device Roller Walker   Distance Walked (feet) 65 ft   Limitations Noted In Balance; Endurance;Strength   Findings level and carpet   Walking (FIM) 2 - Patient ambulates between 50 - 149 feet regardless of assist/device/set up   Wheelchair mobility   QI: Does the patient use a wheelchair? 0  No   QI: 1 Step (Curb)   Assistance Needed Supervision   Assistance Provided by Willis No physical assistance   1 Step (Curb) CARE Score 4   Stairs   Type Stairs   # of Steps 3   Assist Devices Bilateral Rail   Findings supervision   Stairs (FIM) 1 - Patient goes up and down less than 4 stairs regardless of assist/device/set up   Therapeutic Interventions   Strengthening seated ther ex 30 reps   Other gait and community re-ed   Assessment   Treatment Assessment Pt to be D/C home today; Mod I for bed mobility and transfers and amb up to 72' with RW; up/down 3 steps with bilateral hand rail supervision; After 3 steps pt refused to do anymore stairs and insisted on going back to his room;  Pt became agitated and yelled, he was done with therapy and refused to do anything else; Pt reported he was hurting too much from all his therapy today; Pt missing 20 min of therapy   PT Barriers   Physical Impairment Decreased strength;Decreased range of motion;Decreased endurance; Impaired balance;Decreased mobility   Functional Limitation Walking;Transfers;Standing;Stair negotiation   Plan   Treatment/Interventions Functional transfer training;LE strengthening/ROM; Elevations; Therapeutic exercise;Gait training   Progress Progressing toward goals   Recommendation   PT - OK to Discharge Yes   Discharge Summary D/C home today   PT Therapy Minutes   PT Time In 1230   PT Time Out 1310   PT Total Time (minutes) 40   PT Mode of treatment - Individual (minutes) 40   PT Mode of treatment - Concurrent (minutes) 0   PT Mode of treatment - Group (minutes) 0   PT Mode of treatment - Co-treat (minutes) 0   PT Mode of Teatment - Total time(minutes) 40 minutes   Therapy Time missed   Time missed?  Yes   Amount of time missed 20   Reason for time missed (Pt refused to finish therapy session)

## 2018-09-17 NOTE — NURSING NOTE
Patient was assessed and interdisciplinary team determined safest means of transportation is via car  Scripts given to wife  Discharge instructions gone over with wife and patient

## 2018-09-17 NOTE — PROGRESS NOTES
Received patient at 1900, awake and alert, he requested pain medication and was informed he would be able to get acetaminophen which he received at [address]  He reported he was in need of narcotic pain medication every 4 hours d/t knee pain being severe  When asked if medication was effective he said , " A little, brings the pain down to like an 8"  He also added that his knee pian is ,"more than a 10 usually"  B/L knee incisions well approximated, staples intact, no s/s of infection, and VSS  Education provided regarding safe use of narcotic pain medication and that medication is usually provided for a short period of time post op but that areas heal and narcotics should no longer be needed  Encouraged patient to discuss his pain with surgeon when he sees him for staple removal visit this week  Patient has been resting quietly with eyes closed at times and at times has been watching TV, no distress seen, call bell within easy reach, will continue to monitor

## 2018-09-17 NOTE — DISCHARGE SUMMARY
Discharge Summary - PMR   Kevyn Marti 68 y o  male MRN: [id number]  Unit/Bed#: [facility] 221-01 Encounter: [id number]    Admission Date: 9/7/18  Admission Orders     Ordered        09/07/18 1415  Admit Patient to  Once               Discharge Date: 9/17/18    Rehabilitation Diagnosis:B/L TKA    Etiologic Diagnosis:Ortho- Joint replacement     HPI75 year old male presented to the hospital for elective bilateral total knee replacements done on 9/7/18     He tolerated the procedure without issue   On POD 1 his hemoglobin was stable and he was able to dorsiflex and plantarflex bilaterally   On POD 2 his dressings were changed and leg drains removed   His incisions were clean dry and intact   He was admitted to inpatient rehab, participated, made significant functional gains and is stable for discharge to home            Procedures Performed: No orders of the defined types were placed in this encounter  Significant Findings, Care, Treatment and Services Provided: rehab    Complications: none        Functional History/ Status: PTA I ADLS & Mobility  On admission to rehab I eating & grooming  Mod A UE dressing, max A LE dressing, total A transfers  RW  Rehab goals Mod I throughout except S for steps  On DC pt is Mod I for bed mobility and transfers and amb up to 72' with RW; up/down 3 steps with bilateral hand rail supervision  Pt made goals      Physical Exam: /82 (BP Location: Left arm)   Pulse 91   Temp 98 2 °F (36 8 °C) (Tympanic)   Resp 16   Ht 5' 10" (1 778 m)   Wt 106 kg (234 lb 3 2 oz)   SpO2 94%   BMI 33 60 kg/m²   /82 (BP Location: Left arm)   Pulse 91   Temp 98 2 °F (36 8 °C) (Tympanic)   Resp 16   Ht 5' 10" (1 778 m)   Wt 106 kg (234 lb 3 2 oz)   SpO2 94%   BMI 33 60 kg/m²     General Appearance:    Alert, cooperative, no distress, appears stated age   Head:    Normocephalic, without obvious abnormality, atraumatic   Eyes:    PERRL, conjunctiva/corneas clear, EOM's intact, fundi     benign, both eyes        Ears:    Normal TM's and external ear canals, both ears   Nose:   Nares normal, septum midline, mucosa normal, no drainage    or sinus tenderness   Throat:   Lips, mucosa, and tongue normal; teeth and gums normal   Neck:   Supple, symmetrical, trachea midline, no adenopathy;        thyroid:  No enlargement/tenderness/nodules; no carotid    bruit or JVD   Back:     Symmetric, no curvature, ROM normal, no CVA tenderness   Lungs:     Clear to auscultation bilaterally, respirations unlabored   Chest wall:    No tenderness or deformity   Heart:    Regular rate and rhythm, S1 and S2 normal, no murmur, rub   or gallop   Abdomen:     Soft, non-tender, bowel sounds active all four quadrants,     no masses, no organomegaly   Genitalia:    Normal male without lesion, discharge or tenderness   Rectal:    Normal tone, normal prostate, no masses or tenderness;    guaiac negative stool   Extremities:   Extremities normal, atraumatic, no cyanosis or edema   Pulses:   2+ and symmetric all extremities   Skin:   b/l knee incisions C/D/Staples intact, Mod edema, resolving ecchymosis  Calves soft, NT  Lymph nodes:   Cervical, supraclavicular, and axillary nodes normal   Neurologic:   CNII-XII intact   Normal strength, sensation and reflexes       throughout       Discharge Diagnosis:b/l TKA    Resolved Problems  Date Reviewed: 9/15/2018    None          Discharge Medications:   Discharge Medication List as of 9/17/2018  2:02 PM      START taking these medications    Details   lidocaine (LIDODERM) 5 % Place 2 patches on the skin daily Remove & Discard patch within 12 hours or as directed by MD, Starting Fri 9/14/2018, Print      oxyCODONE (ROXICODONE) 10 MG TABS Take 1 tablet (10 mg total) by mouth every 4 (four) hours as needed for moderate pain for up to 10 days Max Daily Amount: 60 mg, Starting Thu 9/13/2018, Until Sun 9/23/2018, Print      polyethylene glycol (MIRALAX) 17 g packet Take 17 g by mouth daily, Starting Fri 9/14/2018, Print      senna (SENOKOT) 8 6 mg Take 1 tablet (8 6 mg total) by mouth daily at bedtime, Starting Thu 9/13/2018, Print             Condition at Discharge: good         Discharge instructions/Information to patient and family:   See after visit summary for information provided to patient and family  Provisions for Follow-Up Care:  See after visit summary for information related to follow-up care and any pertinent home health orders  Disposition: Home    Planned Readmission: No    Discharge Statement   I spent 46 minutes discharging the patient  This time was spent on the day of discharge  I had direct contact with the patient on the day of discharge  Additional documentation is required if more than 30 minutes were spent on discharge  Discharge Medications:  See after visit summary for reconciled discharge medications provided to patient and family

## 2018-09-20 NOTE — PHYSICAL THERAPY NOTE
PT DISCHARGE SUMMARY:    Patient has met max benefit for inpatient [facility] PT  Patient MOD I bed mobility; MOD I all transfers with walker; MOD I ambulation with walker up to 65' level and carpeted surfaces; S up to 3 steps with 2 handrails - patient declined further stair training  Patient for d/c to home with continued services 9/17/18  Patient safe to transport home via car

## 2018-10-02 ENCOUNTER — EVALUATION (OUTPATIENT)
Dept: PHYSICAL THERAPY | Facility: CLINIC | Age: 73
End: 2018-10-02
Payer: MEDICARE

## 2018-10-02 DIAGNOSIS — M17.0 PRIMARY OSTEOARTHRITIS OF BOTH KNEES: Primary | ICD-10-CM

## 2018-10-02 DIAGNOSIS — Z96.653 STATUS POST TOTAL BILATERAL KNEE REPLACEMENT: ICD-10-CM

## 2018-10-02 PROCEDURE — 97162 PT EVAL MOD COMPLEX 30 MIN: CPT | Performed by: PHYSICAL THERAPIST

## 2018-10-02 PROCEDURE — 97140 MANUAL THERAPY 1/> REGIONS: CPT | Performed by: PHYSICAL THERAPIST

## 2018-10-02 PROCEDURE — G8978 MOBILITY CURRENT STATUS: HCPCS | Performed by: PHYSICAL THERAPIST

## 2018-10-02 PROCEDURE — 97535 SELF CARE MNGMENT TRAINING: CPT | Performed by: PHYSICAL THERAPIST

## 2018-10-02 PROCEDURE — G8979 MOBILITY GOAL STATUS: HCPCS | Performed by: PHYSICAL THERAPIST

## 2018-10-02 NOTE — PROGRESS NOTES
PT Evaluation     Today's date: 10/2/2018  Patient name: Lenora Martini  : [id number]  MRN: [id number]  Referring provider: Wang Jackson DO  Dx:   Encounter Diagnosis     ICD-10-CM    1  Primary osteoarthritis of both knees M17 0 Ambulatory referral to Physical Therapy   2  Status post total bilateral knee replacement [id number]                   Assessment  Impairments: abnormal gait, abnormal or restricted ROM, activity intolerance, impaired balance, impaired physical strength, lacks appropriate home exercise program and pain with function  Functional limitations: difficulty standing or ambulating longer than 10 mins, difficulty squatting deeply to use toilet, unable to kneel, patient is not driving, and difficulty with up/down the stepsPatient presents with symptom irritability no  Assessment details: Lenora Martini is a 68 y o  male with a history of arthritis and LBP that presents for a moderate complexity physical therapy initial evaluation  The patient demonstrates signs and symptoms consistent with B/L knee OA s/p B/L TKA surgery  During the examination the patient demonstrated decreased strength, decreased ROM, gait dysfunction, and pain  The patient's impairments are causing the following functional limitations: difficulty standing or ambulating longer than 10 mins, difficulty squatting deeply to use toilet, unable to kneel, patient is not driving, and difficulty with up/down the steps  The patient's clinical presentation is evolving due to severe functional limitations at this time  The patient will benefit from skilled PT services to address impairments, work towards goals, and restore PLOF  Understanding of Dx/Px/POC: good   Prognosis: good    Goals  STG: Achieve in 4 weeks    1  Patien B/L knee pain at worst less than 2/10 to allow for proper gait  2   Patient's B/L knee ROM improve by 20-25 degrees to improve squatting    3   LE MMT improve to > 4/5 all motions tested to improve ADL/recreational activities  4   Patient's TUG score improve to below 14 seconds(fall risk indicator)  LTG:  Achieve in 6-8weeks    1  Patient's knee FOTO score improve by 20% from last assessment  2   Patient achieve personal goal of walking 2 blocks without assistive device without knee pain  3  Patient to achieve independence with home exercise plan  4   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      Plan  Patient would benefit from: skilled physical therapy  Planned modality interventions: ultrasound, cryotherapy and thermotherapy: hydrocollator packs  Planned therapy interventions: manual therapy, joint mobilization, massage, neuromuscular re-education, patient education, self care, strengthening, stretching, therapeutic exercise, home exercise program, flexibility, functional ROM exercises, balance, balance/weight bearing training and gait training  Frequency: 3x week  Duration in visits: 18  Duration in weeks: 8  Plan of Care beginning date: 10/2/2018  Plan of Care expiration date: 11/29/2018  Treatment plan discussed with: patient        Subjective Evaluation    History of Present Illness  Date of onset: 3/1/2018  Date of surgery: 9/5/2018  Mechanism of injury: Donald Ellis is a 68 y o  male that presents to outpatient physical therapy with complaints of bilateral knee pain, muscle weakness, and difficulty walking long distances  The patient had a B/L TKA done at [facility] done by Dr Justin Hernandez  The patient went to acute rehab the day after the surgery and stayed for 13 days  The patient has home care services 1 5 weeks after his rehab stay  The patient denies any issues with infection, blood clots, or falls  The patient reports feeling fatigued and pain at his knees with standing for prolonged periods of time    The patient's main goal for physical therapy is to improve the bend at his right knee for ADL's and improve his strength/endurance to return to work  Patient works at [employer] at [employer] 14 hours/week  He is not back to this job at this time  Not a recurrent problem   Quality of life: good    Pain  Current pain ratin  At best pain ratin  At worst pain ratin  Location: B/L lateral knee joint line and B/L IT bands  Quality: dull ache, cramping and squeezing  Relieving factors: rest and ice  Aggravating factors: standing, walking, stair climbing and running  Progression: improved    Social Support  Steps to enter house: yes  3  Stairs in house: no   Lives in: trailer  Lives with: spouse    Employment status: not working (employed at [employer] part time)  Hand dominance: right    Treatments  No previous or current treatments  Previous treatment: physical therapy  Current treatment: medication and physical therapy  Discharged from (in last 30 days): inpatient hospitalization and home health care  Patient Goals  Patient goals for therapy: decreased edema, increased strength, return to sport/leisure activities, decreased pain, improved balance, increased motion and return to work  Patient goal: walk 2 blocks without difficulty        Objective     Observations   Left Knee   Positive for adhesive scar  Right Knee   Positive for adhesive scar  Additional Observation Details  The patient has a longitudinal incision at mid line  B/L knees that look scabbed but closed  The patient's steri-strips are curling off  There is no redness, mild heat, and no drainage noted  The patient's right anterior knee incision has a dime size scab at the joint line from a scab that was scraped off from the patient's pants  Palpation   Left   No palpable tenderness to the distal biceps femoris, distal semimembranosus, distal semitendinosus, lateral gastrocnemius, medial gastrocnemius, rectus femoris, vastus lateralis and vastus medialis       Right   No palpable tenderness to the distal biceps femoris, distal semimembranosus, distal semitendinosus, lateral gastrocnemius, medial gastrocnemius, rectus femoris and vastus medialis  Tenderness of the vastus lateralis  Tenderness     Right Knee   Tenderness in the ITB  Neurological Testing     Sensation     Knee   Left Knee   Intact: light touch and hot/cold discrimination    Right Knee   Intact: light touch and hot/cold discrimination     Active Range of Motion   Left Knee   Flexion: 92 degrees   Extension: -6 degrees     Right Knee   Flexion: 74 degrees   Extension: -13 degrees     Strength/Myotome Testing     Left Hip   Planes of Motion   Flexion: 4-  Abduction: 4  Adduction: 4    Right Hip   Planes of Motion   Flexion: 3+  Abduction: 4  Adduction: 4    Left Knee   Flexion: 3+  Extension: 3+    Right Knee   Flexion: 3+  Extension: 3+    Tests     Additional Tests Details  TU seconds  Single leg stance:  B/L LOB after 1 second without hands holding onto support    Swelling     Left Knee Girth Measurement (cm)   Joint line: 42 cm    Right Knee Girth Measurement (cm)   Joint line: 43 cm    General Comments     Knee Comments  The patient's gait is impaired - he uses a RW for distances greater than 250 feet due to fatigue  The patient demonstrates increased medial/lateral sway, decreased heel strike B/L and decreased toe roll with his R LE        Flowsheet Rows      Most Recent Value   PT/OT G-Codes   Current Score  41   Projected Score  55   Assessment Type  Evaluation   G code set  Mobility: Walking & Moving Around   Mobility: Walking and Moving Around Current Status ()  CK   Mobility: Walking and Moving Around Goal Status ()  CI          Re-Evaluation:  18    Knee Specialty Daily Treatment Diary     Manual  10/2/18       B/L Knee(sit) Flex/Ext stretch 1 min each  Flex/ext       B/L knee stretch (supine) 2x:30 each flex/ext       B/L prone quad stretch ------       Patellar mobs ------ *      Right IT band muscle roll 1 min (instructed for home) --------          Exercise Diary  10/2/18       Nu step S=13 L2 x 7 mins       QS B/L roll knees x10 :05(after stretch)       SLR all planes        SAQ  *      LAQ        Hamstring stretch        Calf stretch  *      Standing hamstring curls  *      hellslides B/L 5x :05 (after stretch)       Leg press Squat        SLB        Step up  *      [employer]        Mini Squat        Total Gym Squat  *      Sidestepping  *      Heel toe ambulation                                    Modalities 10/2/18       CP B/L knee                          The patient was given a new home exercise plan with written handout, pictures, and verbal instruction  The patient accepts and understands the new home activities

## 2018-10-02 NOTE — LETTER
2018    Cady Dubose DO  [address]  [address]  [facility] [address]    Patient: Aníbal Delgado   YOB: 1945   Date of Visit: 10/2/2018     Encounter Diagnosis     ICD-10-CM    1  Primary osteoarthritis of both knees M17 0 Ambulatory referral to Physical Therapy   2  Status post total bilateral knee replacement Z96 653        Dear Dr Gross Jennifer:    Please review the attached Plan of Care from [facility] recent visit  Please verify that you agree therapy should continue by signing the attached document and sending it back to our office  If you have any questions or concerns, please don't hesitate to call  Sincerely,    Fer Fonseca, PT      Referring Provider:      I certify that I have read the below Plan of Care and certify the need for these services furnished under this plan of treatment while under my care  Cady Dubose DO  [address]  [address]: [phone]          PT Evaluation     Today's date: 10/2/2018  Patient name: Aníbal Delgado  :   MRN: [id number]  Referring provider: Farida Stinson DO  Dx:   Encounter Diagnosis     ICD-10-CM    1  Primary osteoarthritis of both knees M17 0 Ambulatory referral to Physical Therapy   2  Status post total bilateral knee replacement Z96 [id number]                   Assessment  Impairments: abnormal gait, abnormal or restricted ROM, activity intolerance, impaired balance, impaired physical strength, lacks appropriate home exercise program and pain with function  Functional limitations: difficulty standing or ambulating longer than 10 mins, difficulty squatting deeply to use toilet, unable to kneel, patient is not driving, and difficulty with up/down the stepsPatient presents with symptom irritability no      Assessment details: Aníbal Delgado is a 68 y o  male with a history of arthritis and LBP that presents for a moderate complexity physical therapy initial evaluation  The patient demonstrates signs and symptoms consistent with B/L knee OA s/p B/L TKA surgery  During the examination the patient demonstrated decreased strength, decreased ROM, gait dysfunction, and pain  The patient's impairments are causing the following functional limitations: difficulty standing or ambulating longer than 10 mins, difficulty squatting deeply to use toilet, unable to kneel, patient is not driving, and difficulty with up/down the steps  The patient's clinical presentation is evolving due to severe functional limitations at this time  The patient will benefit from skilled PT services to address impairments, work towards goals, and restore PLOF  Understanding of Dx/Px/POC: good   Prognosis: good    Goals  STG: Achieve in 4 weeks    1  Patien B/L knee pain at worst less than 2/10 to allow for proper gait  2   Patient's B/L knee ROM improve by 20-25 degrees to improve squatting  3   LE MMT improve to > 4/5 all motions tested to improve ADL/recreational activities  4   Patient's TUG score improve to below 14 seconds(fall risk indicator)  LTG:  Achieve in 6-8weeks    1  Patient's knee FOTO score improve by 20% from last assessment  2   Patient achieve personal goal of walking 2 blocks without assistive device without knee pain  3  Patient to achieve independence with home exercise plan    4   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      Plan  Patient would benefit from: skilled physical therapy  Planned modality interventions: ultrasound, cryotherapy and thermotherapy: hydrocollator packs  Planned therapy interventions: manual therapy, joint mobilization, massage, neuromuscular re-education, patient education, self care, strengthening, stretching, therapeutic exercise, home exercise program, flexibility, functional ROM exercises, balance, balance/weight bearing training and gait training  Frequency: 3x week  Duration in visits: 18  Duration in weeks: 8  Plan of Care beginning date: 10/2/2018  Plan of Care expiration date: 2018  Treatment plan discussed with: patient        Subjective Evaluation    History of Present Illness  Date of onset: 3/1/2018  Date of surgery: 2018  Mechanism of injury: Haile Paige is a 68 y o  male that presents to outpatient physical therapy with complaints of bilateral knee pain, muscle weakness, and difficulty walking long distances  The patient had a B/L TKA done at [facility] done by Dr Juan Daniel Barlow  The patient went to acute rehab the day after the surgery and stayed for 13 days  The patient has home care services 1 5 weeks after his rehab stay  The patient denies any issues with infection, blood clots, or falls  The patient reports feeling fatigued and pain at his knees with standing for prolonged periods of time  The patient's main goal for physical therapy is to improve the bend at his right knee for ADL's and improve his strength/endurance to return to work  Patient works at [employer] at [employer] 14 hours/week  He is not back to this job at this time      Not a recurrent problem   Quality of life: good    Pain  Current pain ratin  At best pain ratin  At worst pain ratin  Location: B/L lateral knee joint line and B/L IT bands  Quality: dull ache, cramping and squeezing  Relieving factors: rest and ice  Aggravating factors: standing, walking, stair climbing and running  Progression: improved    Social Support  Steps to enter house: yes  3  Stairs in house: no   Lives in: trailer  Lives with: spouse    Employment status: not working (employed at [employer] part time)  Hand dominance: right    Treatments  No previous or current treatments  Previous treatment: physical therapy  Current treatment: medication and physical therapy  Discharged from (in last 30 days): inpatient hospitalization and home health care  Patient Goals  Patient goals for therapy: decreased edema, increased strength, return to sport/leisure activities, decreased pain, improved balance, increased motion and return to work  Patient goal: walk 2 blocks without difficulty        Objective     Observations   Left Knee   Positive for adhesive scar  Right Knee   Positive for adhesive scar  Additional Observation Details  The patient has a longitudinal incision at mid line  B/L knees that look scabbed but closed  The patient's steri-strips are curling off  There is no redness, mild heat, and no drainage noted  The patient's right anterior knee incision has a dime size scab at the joint line from a scab that was scraped off from the patient's pants  Palpation   Left   No palpable tenderness to the distal biceps femoris, distal semimembranosus, distal semitendinosus, lateral gastrocnemius, medial gastrocnemius, rectus femoris, vastus lateralis and vastus medialis  Right   No palpable tenderness to the distal biceps femoris, distal semimembranosus, distal semitendinosus, lateral gastrocnemius, medial gastrocnemius, rectus femoris and vastus medialis  Tenderness of the vastus lateralis  Tenderness     Right Knee   Tenderness in the ITB       Neurological Testing     Sensation     Knee   Left Knee   Intact: light touch and hot/cold discrimination    Right Knee   Intact: light touch and hot/cold discrimination     Active Range of Motion   Left Knee   Flexion: 92 degrees   Extension: -6 degrees     Right Knee   Flexion: 74 degrees   Extension: -13 degrees     Strength/Myotome Testing     Left Hip   Planes of Motion   Flexion: 4-  Abduction: 4  Adduction: 4    Right Hip   Planes of Motion   Flexion: 3+  Abduction: 4  Adduction: 4    Left Knee   Flexion: 3+  Extension: 3+    Right Knee   Flexion: 3+  Extension: 3+    Tests     Additional Tests Details  TU seconds  Single leg stance:  B/L LOB after 1 second without hands holding onto support    Swelling     Left Knee Girth Measurement (cm)   Joint line: 42 cm    Right Knee Girth Measurement (cm)   Joint line: 43 cm    General Comments     Knee Comments  The patient's gait is impaired - he uses a RW for distances greater than 250 feet due to fatigue  The patient demonstrates increased medial/lateral sway, decreased heel strike B/L and decreased toe roll with his R LE  Flowsheet Rows      Most Recent Value   PT/OT G-Codes   Current Score  41   Projected Score  55   Assessment Type  Evaluation   G code set  Mobility: Walking & Moving Around   Mobility: Walking and Moving Around Current Status ()  CK   Mobility: Walking and Moving Around Goal Status ()  CI          Re-Evaluation:  11/1/18    Knee Specialty Daily Treatment Diary     Manual  10/2/18       B/L Knee(sit) Flex/Ext stretch 1 min each  Flex/ext       B/L knee stretch (supine) 2x:30 each flex/ext       B/L prone quad stretch ------       Patellar mobs ------ *      Right IT band muscle roll 1 min (instructed for home) --------          Exercise Diary  10/2/18       Nu step S=13 L2 x 7 mins       QS B/L roll knees x10 :05(after stretch)       SLR all planes        SAQ  *      LAQ        Hamstring stretch        Calf stretch  *      Standing hamstring curls  *      hellslides B/L 5x :05 (after stretch)       Leg press Squat        SLB        Step up  *      [employer]        Mini Squat        Total Gym Squat  *      Sidestepping  *      Heel toe ambulation                                    Modalities 10/2/18       CP B/L knee                          The patient was given a new home exercise plan with written handout, pictures, and verbal instruction  The patient accepts and understands the new home activities

## 2018-10-03 ENCOUNTER — OFFICE VISIT (OUTPATIENT)
Dept: PHYSICAL THERAPY | Facility: CLINIC | Age: 73
End: 2018-10-03
Payer: MEDICARE

## 2018-10-03 DIAGNOSIS — Z96.653 STATUS POST TOTAL BILATERAL KNEE REPLACEMENT: ICD-10-CM

## 2018-10-03 DIAGNOSIS — M17.0 PRIMARY OSTEOARTHRITIS OF BOTH KNEES: Primary | ICD-10-CM

## 2018-10-03 PROCEDURE — 97110 THERAPEUTIC EXERCISES: CPT

## 2018-10-03 PROCEDURE — 97140 MANUAL THERAPY 1/> REGIONS: CPT

## 2018-10-03 NOTE — PROGRESS NOTES
Daily Note     Today's date: 10/3/2018  Patient name: Rama Merino  : 1945  MRN: [id number]  Referring provider: Angel Hernandez DO  Dx:   Encounter Diagnosis     ICD-10-CM    1  Primary osteoarthritis of both knees M17 0    2  Status post total bilateral knee replacement Z96 [id number]                   Subjective: Patient states he had a bad night with pain (3/10)  Yesterday was the first day without pain medication and his knees hurt  Today, the pain in the right knee 3/10 and left knee 2/10  He has been trying to do his exercises at home  Objective: See treatment diary below  Re-Evaluation:  18    Knee Specialty Daily Treatment Diary     Manual  10/2/18 10/3/18      B/L Knee(sit) Flex/Ext stretch 1 min each  Flex/ext 1 min each  Flex/ext      B/L knee stretch (supine) 2x:30 each flex/ext 2x :30 ea   Flex/ext      B/L prone quad stretch ------ ------      Patellar mobs ------ *x10 ea dir      Right IT band muscle roll 1 min (instructed for home) --------          Exercise Diary  10/2/18 10/3/18      Nu step S=13 L2 x 7 mins L2x 8 min      QS B/L roll knees x10 :05(after stretch) :05x15 ea      SLR all planes   * flex     SAQ  *:05x10 ea      LAQ   *     Hamstring stretch   *     Calf stretch  *:30x3      Standing hamstring curls  *x10 ea      hellslides B/L 5x :05 (after stretch) :05  2x5  Stretch between      Leg press Squat        SLB        Step up  *4" x10 ea fw/side      Fitter board        Mini Squat   *     Total Gym Squat  *x15      Sidestepping  *10ftx4      Heel toe ambulation                                    Modalities 10/2/18 10/3/18      CP B/L knee  declined                        Patient home exercises program updated to include additional exercises  Handouts given and explained  Assessment: Tolerated treatment well  Patient demonstrated fatigue post treatment and would benefit from continued PT for strengthening and stretching   Patient able to increase and add exercises without difficulty, but he continues with pain  Plan: Continue per plan of care  Progress treatment as tolerated

## 2018-10-05 ENCOUNTER — OFFICE VISIT (OUTPATIENT)
Dept: PHYSICAL THERAPY | Facility: CLINIC | Age: 73
End: 2018-10-05
Payer: MEDICARE

## 2018-10-05 DIAGNOSIS — Z96.653 STATUS POST TOTAL BILATERAL KNEE REPLACEMENT: ICD-10-CM

## 2018-10-05 DIAGNOSIS — M17.0 PRIMARY OSTEOARTHRITIS OF BOTH KNEES: Primary | ICD-10-CM

## 2018-10-05 PROCEDURE — 97140 MANUAL THERAPY 1/> REGIONS: CPT

## 2018-10-05 PROCEDURE — 97110 THERAPEUTIC EXERCISES: CPT

## 2018-10-05 NOTE — PROGRESS NOTES
Daily Note     Today's date: 10/5/2018  Patient name: Esther Villalta  : 1945  MRN: [id number]  Referring provider: Eleazar Hampton DO  Dx:   Encounter Diagnosis     ICD-10-CM    1  Primary osteoarthritis of both knees M17 0    2  Status post total bilateral knee replacement Z96 [id number]                   Subjective: Patient states his knees hurt a lot yesterday because he ran out of pain medication  He states he got them at 3 PM yesyerday and he was able to get some sleep  He now rates his pain 3/10 in both knees  He feels the exercises are going well at home as long as he is on the pain medication        Objective: See treatment diary below  Re-Evaluation:  18    Knee Specialty Daily Treatment Diary     Manual  10/2/18 10/3/18 10/5/18     B/L Knee(sit) Flex/Ext stretch 1 min each  Flex/ext 1 min each  Flex/ext 1 min each   Flex/ext     B/L knee stretch (supine) 2x:30 each flex/ext 2x :30 ea   Flex/ext 2x :30 ea  Flex/ext     B/L prone quad stretch ------ ------ -------     Patellar mobs ------ *x10 ea dir home     Right IT band muscle roll 1 min (instructed for home) -------- --------         Exercise Diary  10/2/18 10/3/18 10/5/18     Nu step S=13 L2 x 7 mins L2x 8 min L2 x 9min     QS B/L roll knees x10 :05(after stretch) :05x15 ea :05x 20 ea     SLR all planes   * flex x10     SAQ  *:05x10 ea :05x 15     LAQ   *:05x 10 ea     Hamstring stretch   *3x:30 ea     Calf stretch  *:30x3 Wedge :30x3     Standing hamstring curls  *x10 ea x15 ea     hellslides B/L 5x :05 (after stretch) :05  2x5  Stretch between :05 3x5  Between stretches     Leg press Squat        SLB        Step up  *4" x10 ea fw/side 4" x 15 ea  Fwd/side     Fitter board        Mini Squat   *x 10     Total Gym Squat  *x15 X 20     Sidestepping  *10ftx4 10ft x 4     Heel toe ambulation                                    Modalities 10/2/18 10/3/18 10/5/18     CP B/L knee  declined                        Exercises added to home exercises to update his program  Handout given and explained  Assessment: Tolerated treatment well  Patient demonstrated fatigue post treatment and would benefit from continued PTfor stretching and strengthening  Patient was able to increase exercises and add exercises to program without difficulty  Plan: Continue per plan of care  Progress treatment as tolerated

## 2018-10-08 ENCOUNTER — OFFICE VISIT (OUTPATIENT)
Dept: PHYSICAL THERAPY | Facility: CLINIC | Age: 73
End: 2018-10-08
Payer: MEDICARE

## 2018-10-08 DIAGNOSIS — Z96.653 STATUS POST TOTAL BILATERAL KNEE REPLACEMENT: ICD-10-CM

## 2018-10-08 DIAGNOSIS — M17.0 PRIMARY OSTEOARTHRITIS OF BOTH KNEES: Primary | ICD-10-CM

## 2018-10-08 PROCEDURE — 97110 THERAPEUTIC EXERCISES: CPT | Performed by: PHYSICAL THERAPIST

## 2018-10-08 PROCEDURE — 97140 MANUAL THERAPY 1/> REGIONS: CPT | Performed by: PHYSICAL THERAPIST

## 2018-10-08 PROCEDURE — 97116 GAIT TRAINING THERAPY: CPT | Performed by: PHYSICAL THERAPIST

## 2018-10-08 NOTE — PROGRESS NOTES
Daily Note     Today's date: 10/8/2018  Patient name: Emiliana Kaminski  : 1945  MRN: [id number]  Referring provider: Benson Huertas DO  Dx:   Encounter Diagnosis     ICD-10-CM    1  Primary osteoarthritis of both knees M17 0    2  Status post total bilateral knee replacement [id number] [id number]                   Subjective: The patient reports 2/10 pain at his B/L knees and is walking into therapy without any assistive devices        Objective: See treatment diary below  Re-Evaluation:  18    Knee Specialty Daily Treatment Diary     Manual  10/2/18 10/3/18 10/5/18 10/8/18    B/L Knee(sit) Flex/Ext stretch 1 min each  Flex/ext 1 min each  Flex/ext 1 min each   Flex/ext 1 min ea  Flex/ext    B/L knee stretch (supine) 2x:30 each flex/ext 2x :30 ea   Flex/ext 2x :30 ea  Flex/ext 3x:30 ea  Ext    B/L prone quad stretch ------ ------ ------- 3x:30 prone  flex    Patellar mobs ------ *x10 ea dir home ------    Right IT band muscle roll 1 min (instructed for home) -------- -------- -------        Exercise Diary  10/2/18 10/3/18 10/5/18 10/8/18    Nu step S=13 L2 x 7 mins L2x 8 min L2 x 9min L2 x10 mins    Prone QS x10 :05(after stretch) :05x15 ea :05x 20 ea Done in prone    SLR all planes   * flex x10 -----    SAQ  *:05x10 ea :05x 15 ----    LAQ   *:05x 10 ea ----    Hamstring stretch   *3x:30 ea 3x:30    Calf stretch  *:30x3 Wedge :30x3 Wedge 3x:30    Standing hamstring curls  *x10 ea x15 ea -----    hellslides B/L 5x :05 (after stretch) :05  2x5  Stretch between :05 3x5  Between stretches -----    Leg press Squat    --------    SLB    ------    Step up  *4" x10 ea fw/side 4" x 15 ea  Fwd/side XL x10 ea fwd/lat    Fitter board    x10 ea 1 (H)    Mini Squat   *x 10     Total Gym Squat  *x15 X 20 3x10    Sidestepping  *10ftx4 10ft x 4 25 feet x 2    Heel toe ambulation    Cabinet 10ftx2    Prone knee flex    x10    Mirror ambulation with cues    25 feet x 10                Modalities 10/2/18 10/3/18 10/5/18 10/8/18    CP B/L knee  declined ----- -----                Assessment: The patient's right knee continues to demonstrate increase muscle tightness compared to the left knee  The patient was able to tolerate prone stretching/exercises today without an increase in knee pain  The patient will benefit from continued PT to improve his strength, range of motion, and balance to allow a return to normal function  The patient's gait improved significantly during today's session with the use of the mirror and verbal cues for feedback  Plan: Continue per plan of care  Progress treatment as tolerated

## 2018-10-10 ENCOUNTER — OFFICE VISIT (OUTPATIENT)
Dept: PHYSICAL THERAPY | Facility: CLINIC | Age: 73
End: 2018-10-10
Payer: MEDICARE

## 2018-10-10 DIAGNOSIS — M17.0 PRIMARY OSTEOARTHRITIS OF BOTH KNEES: Primary | ICD-10-CM

## 2018-10-10 DIAGNOSIS — Z96.653 STATUS POST TOTAL BILATERAL KNEE REPLACEMENT: ICD-10-CM

## 2018-10-10 PROCEDURE — 97140 MANUAL THERAPY 1/> REGIONS: CPT

## 2018-10-10 PROCEDURE — 97110 THERAPEUTIC EXERCISES: CPT

## 2018-10-10 NOTE — PROGRESS NOTES
Daily Note     Today's date: 10/10/2018  Patient name: Brien Nunez  : 1945  MRN: [id number]  Referring provider: Jorge Mcginnis DO  Dx:   Encounter Diagnosis     ICD-10-CM    1  Primary osteoarthritis of both knees M17 0    2  Status post total bilateral knee replacement [id number] [id number]                   Subjective: Patient rates his left knee 1/10 and right knee 2/10  He reports seeing daily improvement with treatment  He feels he is moving better with his walking since he started practicing        Objective: See treatment diary below  Re-Evaluation:  18    Knee Specialty Daily Treatment Diary     Manual  10/2/18 10/3/18 10/5/18 10/8/18 10/10/18   B/L Knee(sit) Flex/Ext stretch 1 min each  Flex/ext 1 min each  Flex/ext 1 min each   Flex/ext 1 min ea  Flex/ext 1 min ea   Flex/ext   B/L knee stretch (supine) 2x:30 each flex/ext 2x :30 ea   Flex/ext 2x :30 ea  Flex/ext 3x:30 ea  Ext 3x :30 ext   B/L prone quad stretch ------ ------ ------- 3x:30 prone  flex 3 x:30 prone flex   Patellar mobs ------ *x10 ea dir home ------ -------   Right IT band muscle roll 1 min (instructed for home) -------- -------- ------- -------       Exercise Diary  10/2/18 10/3/18 10/5/18 10/8/18 10/10/18   Nu step S=13 L2 x 7 mins L2x 8 min L2 x 9min L2 x10 mins L2 x10 min   Prone QS x10 :05(after stretch) :05x15 ea :05x 20 ea Done in prone :05x30   SLR all planes   * flex x10 ----- ------   SAQ  *:05x10 ea :05x 15 ---- ------   LAQ   *:05x 10 ea ---- -------   Hamstring stretch   *3x:30 ea 3x:30 3x :30  stand   Calf stretch  *:30x3 Wedge :30x3 Wedge 3x:30 Wedge 3x:30   Standing hamstring curls  *x10 ea x15 ea ----- -------   ryann B/L 5x :05 (after stretch) :05  2x5  Stretch between :05 3x5  Between stretches ----- Between stretches   Leg press Squat    -------- -------   SLB    ------ -------   Step up  *4" x10 ea fw/side 4" x 15 ea  Fwd/side XL x10 ea fwd/lat XL x15 ea  Fwd/lat   Fitter board    x10 ea 1 (H) x15 ea 1H   Mini Squat   *x 10  --------   Total Gym Squat  *x15 X 20 3x10 3x10   Sidestepping  *10ftx4 10ft x 4 25 feet x 2 25 ftx2   Heel toe ambulation    Cabinet 10ftx2 Cabinet 10ftx2   Prone knee flex    x10 x15   Mirror ambulation with cues    25 feet x 10 25 ft x10   FOTO     performed       Modalities 10/2/18 10/3/18 10/5/18 10/8/18 10/18   CP B/L knee  declined ----- ----- -----               Assessment: Tolerated treatment well  Patient demonstrated fatigue post treatment and would benefit from continued PT for stretching and strengthening  Patient was able to increase and change exercises without difficulty  He still has continued limited knee range of motion in both knees  Plan: Continue per plan of care  Progress treatment as tolerated

## 2018-10-12 ENCOUNTER — TRANSCRIBE ORDERS (OUTPATIENT)
Dept: PHYSICAL THERAPY | Facility: CLINIC | Age: 73
End: 2018-10-12

## 2018-10-12 ENCOUNTER — OFFICE VISIT (OUTPATIENT)
Dept: PHYSICAL THERAPY | Facility: CLINIC | Age: 73
End: 2018-10-12
Payer: MEDICARE

## 2018-10-12 DIAGNOSIS — M17.0 PRIMARY OSTEOARTHRITIS OF BOTH KNEES: Primary | ICD-10-CM

## 2018-10-12 DIAGNOSIS — Z96.653 STATUS POST TOTAL BILATERAL KNEE REPLACEMENT: ICD-10-CM

## 2018-10-12 PROCEDURE — 97110 THERAPEUTIC EXERCISES: CPT | Performed by: PHYSICAL THERAPIST

## 2018-10-12 PROCEDURE — 97140 MANUAL THERAPY 1/> REGIONS: CPT | Performed by: PHYSICAL THERAPIST

## 2018-10-12 NOTE — PROGRESS NOTES
Daily Note     Today's date: 10/12/2018  Patient name: Camryn Ferreira  : 1945  MRN: [id number]  Referring provider: Clay Rodriguez DO  Dx:   Encounter Diagnosis     ICD-10-CM    1  Primary osteoarthritis of both knees M17 0    2  Status post total bilateral knee replacement [id number] [id number]                   Subjective: Patient complains of 2-3/10 pain at his anterior knees  "they feel tight today "  The patient's gait is still impaired upon entering today with increased medial/lateral sway          Objective: See treatment diary below  Re-Evaluation:  18    Knee Specialty Daily Treatment Diary     Manual  10/12/18   10/8/18 10/10/18   B/L Knee(sit) Flex/Ext stretch 1 min flex/ext L  2 mins flex R   1 min ea  Flex/ext 1 min ea   Flex/ext   B/L knee ext stretch (supine) 3x:30 ea   3x:30 ea  Ext 3x :30 ext   B/L prone quad stretch R 6x:30   L 3x:30   3x:30 prone  flex 3 x:30 prone flex   Patellar mobs    ------ -------   Supine knee flexion stretch R only 4x:30  Contract/relax   ------- -------       Exercise Diary  10/12/18   10/8/18 10/10/18   Nu step S=13 L3 x10 mins   L2 x10 mins L2 x10 min   Prone QS -----   Done in prone :05x30   SLR all planes ---   ----- ------   SAQ -----   ---- ------   LAQ ----   ---- -------   HOIST knee flexion stretch 2pl 3rd hole  x2mins       Hamstring stretch 2x:30 stand   3x:30 3x :30  stand   Calf stretch Wedge    Wedge 3x:30 Wedge 3x:30   Standing hamstring curls    ----- -------   hellslides B/L x10 B/L   ----- Between stretches   Leg press Squat ------   -------- -------   SLB ----- *  ------ -------   Step up XL x10 ea   XL x10 ea fwd/lat XL x15 ea  Fwd/lat   Fitter board 0H x20 each   x10 ea 1 (H) x15 ea 1H           Total Gym Squat 3x10 bottom hold :03   3x10 3x10   Sidestepping 25 feet x 2   25 feet x 2 25 ftx2   Heel toe ambulation Cabinet 10 feet x 4   Cabinet 10ftx2 Cabinet 10ftx2   Prone knee flex x15 B/L after stretch   x10 x15   Mirror ambulation with cues 25 feet x 6 with cues to increase gait speed and reduce med/lat sway   25 feet x 10 25 ft x10   FOTO     performed       Modalities 10/12/18   10/8/18 10/18   CP B/L knee declined   ----- -----               Assessment: We increased the manual stretching today with the right knee because it is significantly more tight than the left knee  The patient was given more stretches to perform at home focused on improving his right knee ROM  The patient will benefit from continued PT to improve his gait and function  Plan: Focus on improving his right knee ROM and normalize his gait

## 2018-10-15 ENCOUNTER — OFFICE VISIT (OUTPATIENT)
Dept: PHYSICAL THERAPY | Facility: CLINIC | Age: 73
End: 2018-10-15
Payer: MEDICARE

## 2018-10-15 DIAGNOSIS — M17.0 PRIMARY OSTEOARTHRITIS OF BOTH KNEES: Primary | ICD-10-CM

## 2018-10-15 DIAGNOSIS — Z96.653 STATUS POST TOTAL BILATERAL KNEE REPLACEMENT: ICD-10-CM

## 2018-10-15 PROCEDURE — 97140 MANUAL THERAPY 1/> REGIONS: CPT | Performed by: PHYSICAL THERAPIST

## 2018-10-15 PROCEDURE — 97110 THERAPEUTIC EXERCISES: CPT | Performed by: PHYSICAL THERAPIST

## 2018-10-15 NOTE — PROGRESS NOTES
Daily Note     Today's date: 10/15/2018  Patient name: Mckenna Stein  : 1945  MRN: [id number]  Referring provider: Gely Rodriges DO  Dx:   Encounter Diagnosis     ICD-10-CM    1  Primary osteoarthritis of both knees M17 0    2  Status post total bilateral knee replacement Z96 653        Start Time: 0800  Stop Time: 0910  Total time in clinic (min): 70 minutes    Subjective: Patient states a little more movement every day in knees  Overall feels "the same"      Objective: See treatment diary below  Re-Evaluation:  18     Knee Specialty Daily Treatment Diary      Manual  10/12/18  10/15   10/8/18 10/10/18   B/L Knee(sit) Flex/Ext stretch 1 min flex/ext L  2 mins flex R  1 min flex/ext L  2 mins flex R   1 min ea  Flex/ext 1 min ea   Flex/ext   B/L knee ext stretch (supine) 3x:30 ea  3x:30 ea   3x:30 ea  Ext 3x :30 ext   B/L prone quad stretch R 6x:30   L 3x:30 R 6x:30   L 3x:30   3x:30 prone  flex 3 x:30 prone flex   Patellar mobs       ------ -------   Supine knee flexion stretch R only 4x:30  Contract/relax  R only 3x30" contract/relax   ------- -------         Exercise Diary  10/12/18  10/15   10/8/18 10/10/18   Nu step S=13 L3 x10 mins  L3 10min   L2 x10 mins L2 x10 min   Prone QS -----     Done in prone :05x30   SLR all planes ---     ----- ------   SAQ -----  2# 10x3   ---- ------   LAQ ----     ---- -------   HOIST knee flexion stretch 2pl 3rd hole  x2mins  2 pl 3rd hold x2 min         Hamstring stretch 2x:30 stand  3x30" bilat standing   3x:30 3x :30  stand   Calf stretch Wedge   wedge 3x30"   Wedge 3x:30 Wedge 3x:30   Standing hamstring curls       ----- -------   heelslides B/L x10 B/L  10x L  10x R with strap   ----- Between stretches   Leg press Squat ------     -------- -------   SLB ----- 3x:15   ------ -------   Step up XL x10 ea  XL x10 ea fwd/lat   XL x10 ea fwd/lat XL x15 ea  Fwd/lat   Fitter board 0H x20 each  1H x20 ea   x10 ea 1 (H) x15 ea 1H                 Total Gym Squat 3x10 bottom hold :03  3x10   3x10 3x10   Sidestepping 25 feet x 2  25 ftx2   25 feet x 2 25 ftx2   Heel toe ambulation Cabinet 10 feet x 4  np   Cabinet 10ftx2 Cabinet 10ftx2   Prone knee flex x15 B/L after stretch  nv   x10 x15   Mirror ambulation with cues 25 feet x 6 with cues to increase gait speed and reduce med/lat sway  25 ft x10   25 feet x 10 25 ft x10   FOTO         performed         Modalities 10/12/18  10/15   10/8/18 10/18   CP B/L knee declined  declined   ----- -----                       Assessment: Excessive lateral movement in frontal plane during ambulation likely due to decreased ability to fully WB in SLS as well as decreased knee flexion  Plan: Continue per plan of care  Progress treatment as tolerated

## 2018-10-17 ENCOUNTER — OFFICE VISIT (OUTPATIENT)
Dept: PHYSICAL THERAPY | Facility: CLINIC | Age: 73
End: 2018-10-17
Payer: MEDICARE

## 2018-10-17 DIAGNOSIS — M17.0 PRIMARY OSTEOARTHRITIS OF BOTH KNEES: Primary | ICD-10-CM

## 2018-10-17 DIAGNOSIS — Z96.653 STATUS POST TOTAL BILATERAL KNEE REPLACEMENT: ICD-10-CM

## 2018-10-17 PROCEDURE — 97110 THERAPEUTIC EXERCISES: CPT | Performed by: PHYSICAL THERAPIST

## 2018-10-17 PROCEDURE — 97140 MANUAL THERAPY 1/> REGIONS: CPT | Performed by: PHYSICAL THERAPIST

## 2018-10-17 NOTE — PROGRESS NOTES
Daily Note     Today's date: 10/17/2018  Patient name: Mckenna Stein  : 1945  MRN: [id number]  Referring provider: Gely Rodriges DO  Dx:   Encounter Diagnosis     ICD-10-CM    1  Primary osteoarthritis of both knees M17 0    2  Status post total bilateral knee replacement Z96 [id number]        Start Time: 0750  Stop Time: 0900  Total time in clinic (min): 70 minutes    Subjective: Patient saw MD after last visit who stated no current issues  Released him to return to work the first week in November         Objective: See treatment diary below    Re-Evaluation:  18     Knee Specialty Daily Treatment Diary      Manual  10/12/18  10/15  10/17     B/L Knee(sit) Flex/Ext stretch 1 min flex/ext L  2 mins flex R  1 min flex/ext L  2 mins flex R  np     B/L knee ext stretch (supine) 3x:30 ea  3x:30 ea  performed     B/L prone quad stretch R 6x:30   L 3x:30 R 6x:30   L 3x:30  np     Patellar mobs      sup/inf, med/lat bilaterally     Supine knee flexion stretch R only 4x:30  Contract/relax  R only 3x30" contract/relax  knee flex and extension bilaterally           Exercise Diary  10/12/18  10/15 10/17     Nu step S=13 L3 x10 mins  L3 10min  L4 10 min     Prone QS -----    5"x10     SLR all planes ---    flex 10x bilat     SAQ -----  2# 10x3  2# 10x3 bilat     LAQ ----    10x bilat     HOIST knee flexion stretch 2pl 3rd hole  x2mins  2 pl 3rd hold x2 min       Hamstring stretch 2x:30 stand  3x30" bilat standing  3x30" bilat standing     Calf stretch Wedge   wedge 3x30"  wedge 3x30"     Standing hamstring curls           heelslides B/L x10 B/L  10x L  10x R with strap  10x L  10x R  with strap     Leg press Squat ------         SLB ----- 3x:15       Step up XL x10 ea  XL x10 ea fwd/lat  XL x10  Ea bilat     Fitter board 0H x20 each  1H x20 ea  0H x30 ea                 Total Gym Squat 3x10 bottom hold :03  3x10  3x10     Sidestepping 25 feet x 2  25 ftx2  np     Heel toe ambulation Cabinet 10 feet x 4  np  np Prone knee flex x15 B/L after stretch  nv  np     Mirror ambulation with cues 25 feet x 6 with cues to increase gait speed and reduce med/lat sway  25 ft x10  np     FOTO                 Modalities 10/12/18  10/15  10/17     CP B/L knee declined  declined  declined                       Assessment: Bilateral lower extremity circumduction with ascending stairs fwd and laterally due to decreased knee flexion  Needs to work on knee flexion strength and ROM for normalized mechanics  Plan: Continue per plan of care  Progress treatment as tolerated

## 2018-10-19 ENCOUNTER — OFFICE VISIT (OUTPATIENT)
Dept: PHYSICAL THERAPY | Facility: CLINIC | Age: 73
End: 2018-10-19
Payer: MEDICARE

## 2018-10-19 DIAGNOSIS — M17.0 PRIMARY OSTEOARTHRITIS OF BOTH KNEES: ICD-10-CM

## 2018-10-19 DIAGNOSIS — Z96.653 STATUS POST TOTAL BILATERAL KNEE REPLACEMENT: Primary | ICD-10-CM

## 2018-10-19 PROCEDURE — 97110 THERAPEUTIC EXERCISES: CPT

## 2018-10-19 PROCEDURE — 97140 MANUAL THERAPY 1/> REGIONS: CPT

## 2018-10-19 NOTE — PROGRESS NOTES
Daily Note     Today's date: 10/19/2018  Patient name: Maria C Barrios  : [birth year]  MRN: [id number]  Referring provider: Nora Lau DO  Dx:   Encounter Diagnosis     ICD-10-CM    1  Status post total bilateral knee replacement Z96 653    2  Primary osteoarthritis of both knees M17 0                   Subjective: Patient rates his pain a 2/10 both knees  He could not get comfortable when he was trying to sleep  By the end of the session, he felt his knees were "looser" by the end of the session        Objective: See treatment diary below    Re-Evaluation:  18 ; MD 18 @ 9:10      Knee Specialty Daily Treatment Diary      Manual  10/12/18  10/15  10/17 10/18/18    B/L Knee(sit) Flex/Ext stretch 1 min flex/ext L  2 mins flex R  1 min flex/ext L  2 mins flex R  np 3 min ea B/L   Both dir    B/L knee ext stretch (supine) 3x:30 ea  3x:30 ea  performed 3x ;30 ea    B/L prone quad stretch R 6x:30   L 3x:30 R 6x:30   L 3x:30  np 3x :30 ea    Patellar mobs      sup/inf, med/lat bilaterally np    Supine knee flexion stretch R only 4x:30  Contract/relax  R only 3x30" contract/relax  knee flex and extension bilaterally np          Exercise Diary  10/12/18  10/15 10/17 10/18/18    Nu step S=13 L3 x10 mins  L3 10min  L4 10 min L4x 10 min    Prone QS -----    5"x10 :05x10 ea    SLR all planes ---    flex 10x bilat Flex 10x B/L    SAQ -----  2# 10x3  2# 10x3 bilat 2 5# x30 B/L    LAQ ----    10x bilat x10B/L    HOIST knee flexion stretch 2pl 3rd hole  x2mins  2 pl 3rd hold x2 min   2pl 3rd hole  X 2min    Hamstring stretch 2x:30 stand  3x30" bilat standing  3x30" bilat standing 3x :30 B/L stand    Calf stretch Wedge   wedge 3x30"  wedge 3x30" Wedge  3x :30    Standing hamstring curls           heelslides B/L x10 B/L  10x L  10x R with strap  10x L  10x R  with strap x15 B/L with strap    Leg press Squat ------         SLB ----- 3x:15   np    Step up XL x10 ea  XL x10 ea fwd/lat  XL x10  Ea bilat XL x 10 ea   B/L Fitter board 0H x20 each  1H x20 ea  0H x30 ea 1H x 30 ea                Total Gym Squat 3x10 bottom hold :03  3x10  3x10 3x10    Sidestepping 25 feet x 2  25 ftx2  np 25ft x2    Heel toe ambulation Cabinet 10 feet x 4  np  np 25ft x 2    Prone knee flex x15 B/L after stretch  nv  np x10ea after stretch    Mirror ambulation with cues 25 feet x 6 with cues to increase gait speed and reduce med/lat sway  25 ft x10  np 25ft x 2    FOTO                 Modalities 10/12/18  10/15  10/17 10/18/18    CP B/L knee declined  declined  declined declined                    Assessment: Tolerated treatment well  Patient would benefit from continued PT for stretching and strengthening  Patient has a decreases flexion in right knee compaired to left  He still has difficulty with flexion activities  He limits self with stretches  Patient given more ideas in ways to increase extension in knees at home  Plan: Continue per plan of care  Progress treatment as tolerated

## 2018-10-22 ENCOUNTER — OFFICE VISIT (OUTPATIENT)
Dept: PHYSICAL THERAPY | Facility: CLINIC | Age: 73
End: 2018-10-22
Payer: MEDICARE

## 2018-10-22 DIAGNOSIS — Z96.653 STATUS POST TOTAL BILATERAL KNEE REPLACEMENT: Primary | ICD-10-CM

## 2018-10-22 DIAGNOSIS — M17.0 PRIMARY OSTEOARTHRITIS OF BOTH KNEES: ICD-10-CM

## 2018-10-22 PROCEDURE — 97140 MANUAL THERAPY 1/> REGIONS: CPT

## 2018-10-22 PROCEDURE — 97110 THERAPEUTIC EXERCISES: CPT

## 2018-10-22 NOTE — PROGRESS NOTES
Daily Note     Today's date: 10/22/2018  Patient name: Lenora Martini  :   MRN: [id number]  Referring provider: Wang Jackson DO  Dx:   Encounter Diagnosis     ICD-10-CM    1  Status post total bilateral knee replacement Z96 653    2  Primary osteoarthritis of both knees M17 0                   Subjective: Patient rates both knee pain /10  He states he had a rough weekend, no sleep  He still only gets sleep 2 hours a night, then he is up for an hour and half before he can sleep again         Objective: See treatment diary below  Re-Evaluation:  18 ; MD 18 @ 9:10      Knee Specialty Daily Treatment Diary      Manual  10/12/18  10/15  10/17 10/18/18 10/22/18   B/L Knee(sit) Flex/Ext stretch 1 min flex/ext L  2 mins flex R  1 min flex/ext L  2 mins flex R  np 3 min ea B/L   Both dir 3 min ea B/L  Both dir   B/L knee ext stretch (supine) 3x:30 ea  3x:30 ea  performed 3x ;30 ea 3x :30 ea   B/L prone quad stretch R 6x:30   L 3x:30 R 6x:30   L 3x:30  np 3x :30 ea 3x :  30 ea   Patellar mobs      sup/inf, med/lat bilaterally np np   Supine knee flexion stretch R only 4x:30  Contract/relax  R only 3x30" contract/relax  knee flex and extension bilaterally np np         Exercise Diary  10/12/18  10/15 10/17 10/18/18 10/22/18   Nu step S=13 L3 x10 mins  L3 10min  L4 10 min L4x 10 min L4 x 10 min   Prone QS -----    5"x10 :05x10 ea :05x15   SLR all planes ---    flex 10x bilat Flex 10x B/L Flex 10x B/L   SAQ -----  2# 10x3  2# 10x3 bilat 2 5# x30 B/L 3# x 20 B/L   LAQ ----    10x bilat x10B/L x10 B/L   HOIST knee flexion stretch 2pl 3rd hole  x2mins  2 pl 3rd hold x2 min   2pl 3rd hole  X 2min 2pl 3rd hole  X 2 min   Hamstring stretch 2x:30 stand  3x30" bilat standing  3x30" bilat standing 3x :30 B/L stand 3x :30 B/L   Calf stretch Wedge   wedge 3x30"  wedge 3x30" Wedge  3x :30 Wedge  3x :30   Standing hamstring curls           heelslides B/L x10 B/L  10x L  10x R with strap  10x L  10x R  with strap x15 B/L with strap x20 with strap  B/L   Leg press Squat ------         SLB ----- 3x:15   np 2x :15 B/L   Step up XL x10 ea  XL x10 ea fwd/lat  XL x10  Ea bilat XL x 10 ea   B/L XL x 10 B/L   Fitter board 0H x20 each  1H x20 ea  0H x30 ea 1H x 30 ea x30ea 0H               Total Gym Squat 3x10 bottom hold :03  3x10  3x10 3x10 3x10   Sidestepping 25 feet x 2  25 ftx2  np 25ft x2 25ft x2   Heel toe ambulation Cabinet 10 feet x 4  np  np 25ft x 2 25ft x 2   Prone knee flex x15 B/L after stretch  nv  np x10ea after stretch :05x 10   Mirror ambulation with cues 25 feet x 6 with cues to increase gait speed and reduce med/lat sway  25 ft x10  np 25ft x 2 25ft x4   FOTO                 Modalities 10/12/18  10/15  10/17 10/18/18 10/22/18   CP B/L knee declined  declined  declined declined --------                     Assessment: Tolerated treatment well  Patient exhibited good technique with therapeutic exercises and would benefit from continued PT for stretching and strengthening  Patient had no difficulty with exercise increases  Plan: Continue per plan of care  Progress treatment as tolerated

## 2018-10-24 ENCOUNTER — EVALUATION (OUTPATIENT)
Dept: PHYSICAL THERAPY | Facility: CLINIC | Age: 73
End: 2018-10-24
Payer: MEDICARE

## 2018-10-24 DIAGNOSIS — Z96.653 STATUS POST TOTAL BILATERAL KNEE REPLACEMENT: Primary | ICD-10-CM

## 2018-10-24 DIAGNOSIS — M17.0 PRIMARY OSTEOARTHRITIS OF BOTH KNEES: ICD-10-CM

## 2018-10-24 PROCEDURE — G8979 MOBILITY GOAL STATUS: HCPCS | Performed by: PHYSICAL THERAPIST

## 2018-10-24 PROCEDURE — G8978 MOBILITY CURRENT STATUS: HCPCS | Performed by: PHYSICAL THERAPIST

## 2018-10-24 PROCEDURE — 97140 MANUAL THERAPY 1/> REGIONS: CPT | Performed by: PHYSICAL THERAPIST

## 2018-10-24 PROCEDURE — 97110 THERAPEUTIC EXERCISES: CPT | Performed by: PHYSICAL THERAPIST

## 2018-10-24 NOTE — LETTER
2018    Bobby Mckeon DO  [address] [address]  [address]    Patient: Mariano Tran   YOB: 1945   Date of Visit: 10/24/2018     Encounter Diagnosis     ICD-10-CM    1  Status post total bilateral knee replacement Z96 653    2  Primary osteoarthritis of both knees M17 0        Dear Dr Nan Brown:    Please review the attached Plan of Care from [facility] recent visit  Please verify that you agree therapy should continue by signing the attached document and sending it back to our office  If you have any questions or concerns, please don't hesitate to call  Sincerely,    Rachelle Herring, PT      Referring Provider:      I certify that I have read the below Plan of Care and certify the need for these services furnished under this plan of treatment while under my care  Bobby Mckeon DO  [address]  [address] [facility] [place] [address] [facility]: [phone]          PT Re-Evaluation     Today's date: 10/24/2018  Patient name: Mariano Tran  : 1945  MRN: [id number]  Referring provider: Josephine Acharya DO  Dx:   Encounter Diagnosis     ICD-10-CM    1  Status post total bilateral knee replacement Z96 653    2  Primary osteoarthritis of both knees M17 0        Start Time: 0800  Stop Time: 09  Total time in clinic (min): 60 minutes    Assessment/Plan   Impairments: abnormal gait, abnormal or restricted ROM, activity intolerance, impaired balance, impaired physical strength, lacks appropriate home exercise program and pain with function  Functional limitations: difficulty standing or ambulating longer than 10 mins, difficulty squatting deeply to use toilet, unable to kneel, patient is not driving, and difficulty with up/down the steps  Patient presents with symptom irritability no      Assessment details: Mariano Tran is a 68 y o  male with a history of arthritis and LBP that presents for a moderate complexity physical therapy initial evaluation  The patient demonstrates signs and symptoms consistent with B/L knee OA s/p B/L TKA surgery  Has been seen in out patient physical therapy 11 sessions beginning 10/2/18  Findings today show significant improvement with bilateral LE strength and ROM  Extension AROM WNL at this time, patient needs to continue to improve bilateral knee flexion ROM for improved gait mechanics and functional activity  Patient also showing general weakness of bilateral LE with instability requiring continuation of skilled PT to return patient to PLOF  Understanding of Dx/Px/POC: good   Prognosis: good    Goals  STG: Achieve in 4 weeks    1  Patien B/L knee pain at worst less than 2/10 to allow for proper gait  - progressing  2  Patient's B/L knee ROM improve by 20-25 degrees to improve squatting  - met  3  LE MMT improve to > 4/5 all motions tested to improve ADL/recreational activities  - met  4  Patient's TUG score improve to below 14 seconds(fall risk indicator)  - met      LTG:  Achieve in 6-8weeks    1  Patient's knee FOTO score improve by 20% from last assessment  - progressing  2  Patient achieve personal goal of walking 2 blocks without assistive device without knee pain  - progressing  3  Patient to achieve independence with home exercise plan  - progressing  4    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 - not met      Plan  Patient would benefit from: skilled physical therapy  Planned modality interventions: ultrasound, cryotherapy and thermotherapy: hydrocollator packs  Planned therapy interventions: manual therapy, joint mobilization, massage, neuromuscular re-education, patient education, self care, strengthening, stretching, therapeutic exercise, home exercise program, flexibility, functional ROM exercises, balance, balance/weight bearing training and gait training  Frequency: 2-3x week  Duration in visits: 18  Duration in weeks: 8  Plan of Care beginning date: 10/2/2018  Plan of Care expiration date: 2018  Treatment plan discussed with: patient      Subjective   History of Present Illness  Date of onset: 3/1/2018  Date of surgery: 2018  Subjective: Patient states he is very happy with progress so far  Able to navigate stairs slowly, but reciprocally  Decrease in overall pain and ability to increase activity  C/o continued restriction in right knee bending making putting on shoes and socks difficult  Episode History: Lenora Martini is a 68 y o  male that presents to outpatient physical therapy with complaints of bilateral knee pain, muscle weakness, and difficulty walking long distances  The patient had a B/L TKA done at [facility] done by Dr Aurelia Dominguez  The patient went to acute rehab the day after the surgery and stayed for 13 days  The patient has home care services 1 5 weeks after his rehab stay  The patient denies any issues with infection, blood clots, or falls  The patient reports feeling fatigued and pain at his knees with standing for prolonged periods of time  The patient's main goal for physical therapy is to improve the bend at his right knee for ADL's and improve his strength/endurance to return to work  Patient works at [employer] at [employer] 14 hours/week  He is not back to this job at this time      Not a recurrent problem   Quality of life: good    Pain    10/24  Current pain ratin  2  At best pain ratin  0  At worst pain ratin  4  Location: B/L lateral knee joint line and B/L IT bands  Quality: dull ache, cramping and squeezing  Relieving factors: rest and ice  Aggravating factors: standing, walking, stair climbing and running  Progression: improved    Social Support  Steps to enter house: yes  3  Stairs in house: no   Lives in: trailer  Lives with: spouse    Employment status: not working (employed at [employer] part time)  Hand dominance: right    Treatments  No previous or current treatments  Previous treatment: physical therapy  Current treatment: medication and physical therapy  Discharged from (in last 30 days): inpatient hospitalization and home health care  Patient Goals  Patient goals for therapy: decreased edema, increased strength, return to sport/leisure activities, decreased pain, improved balance, increased motion and return to work  Patient goal: walk 2 blocks without difficulty    Objective   Observations   Left Knee   Positive for adhesive scar  Right Knee   Positive for adhesive scar  Additional Observation Details  The patient has a longitudinal incision at mid line  B/L knees that are closed and scarred  Palpation   Left   No palpable tenderness to the distal biceps femoris, distal semimembranosus, distal semitendinosus, lateral gastrocnemius, medial gastrocnemius, rectus femoris, vastus lateralis and vastus medialis  Right   No palpable tenderness to the distal biceps femoris, distal semimembranosus, distal semitendinosus, lateral gastrocnemius, medial gastrocnemius, rectus femoris and vastus medialis  Tenderness of the vastus lateralis  Tenderness     Right Knee   Tenderness in the ITB       Neurological Testing     Sensation     Knee   Left Knee   Intact: light touch and hot/cold discrimination    Right Knee   Intact: light touch and hot/cold discrimination     Comments: C/o numbness at distal knees bilaterally    Active Range of Motion  10/24  Left Knee   Flexion: 92 degrees   111  Extension: -6 degrees  3    Right Knee   Flexion: 74 degrees   110 pain  Extension: -13 degrees  0    Strength/Myotome Testing  10/24    Left Hip   Planes of Motion   Flexion: 4-    4+  Abduction: 4    4+  Adduction: 4    Right Hip   Planes of Motion   Flexion: 3+    4  Abduction: 4    4  Adduction: 4    Left Knee   Flexion: 3+    4+  Extension: 3+    4+    Right Knee   Flexion: 3+    4  Extension: 3+    4    Tests             10/24    Additional Tests Details  TU seconds          10 sec  Single leg stance:  B/L LOB after 1 second without hands holding onto support  3 sec bilat    Swelling      10/24    Left Knee Girth Measurement (cm)   Joint line: 42 cm    42cm    Right Knee Girth Measurement (cm)   Joint line: 43 cm    42cm    General Comments     Knee Comments  The patient's gait is impaired- No longer uses AD SPC or walker  The patient demonstrates increased medial/lateral sway, decreased heel strike B/L and decreased toe roll with his R LE      Flowsheet Rows      Most Recent Value   PT/OT G-Codes   Current Score  51   Projected Score  55   FOTO information reviewed  Yes   Assessment Type  Re-evaluation   G code set  Mobility: Walking & Moving Around   Mobility: Walking and Moving Around Current Status ()  CK   Mobility: Walking and Moving Around Goal Status ()  CI       Re-Evaluation:  18 ; MD 18 @ 9:10      Knee Specialty Daily Treatment Diary      Manual  10/24/18  10/15  10/17 10/18/18 10/22/18   B/L Knee(sit) Flex/Ext stretch np  1 min flex/ext L  2 mins flex R  np 3 min ea B/L   Both dir 3 min ea B/L  Both dir   B/L knee ext stretch (supine) performed  3x:30 ea  performed 3x ;30 ea 3x :30 ea   B/L prone quad stretch np R 6x:30   L 3x:30  np 3x :30 ea 3x :  30 ea   Patellar mobs  sup/inf, med/lat bilaterally    sup/inf, med/lat bilaterally np np   Right ITB  Next visit       Supine knee flexion stretch performed  R only 3x30" contract/relax  knee flex and extension bilaterally np np         Exercise Diary  10/24/18  10/15 10/17 10/18/18 10/22/18   Nu step S=13 L4 x 10 min  L3 10min  L4 10 min L4x 10 min L4 x 10 min   Prone QS 10x5" bilat    5"x10 :05x10 ea :05x15   SLR all planes Flex 2x10 bilat    flex 10x bilat Flex 10x B/L Flex 10x B/L   SAQ 3# x 20 B/L  2# 10x3  2# 10x3 bilat 2 5# x30 B/L 3# x 20 B/L   LAQ np    10x bilat x10B/L x10 B/L   HOIST knee flexion stretch np  2 pl 3rd hold x2 min   2pl 3rd hole  X 2min 2pl 3rd hole  X 2 min   Hamstring stretch np  3x30" bilat standing  3x30" bilat standing 3x :30 B/L stand 3x :30 B/L   Calf stretch np  wedge 3x30"  wedge 3x30" Wedge  3x :30 Wedge  3x :30   Standing hamstring curls          heelslides B/L x20 with strap  B/L  10x L  10x R with strap  10x L  10x R  with strap x15 B/L with strap x20 with strap  B/L   Leg press Squat          SLB 2x15 bilat 3x:15   np 2x :15 B/L   Step up XL 2x10  Fwd/lat  bilat  XL x10 ea fwd/lat  XL x10  Ea bilat XL x 10 ea   B/L XL x 10 B/L   Fitter board   1H x20 ea  0H x30 ea 1H x 30 ea x30ea 0H              Total Gym Squat np  3x10  3x10 3x10 3x10   Sidestepping np  25 ftx2  np 25ft x2 25ft x2   Heel toe ambulation np  np  np 25ft x 2 25ft x 2   Prone knee flex np  nv  np x10ea after stretch :05x 10   Mirror ambulation with cues np  25 ft x10  np 25ft x 2 25ft x4   FOTO  completed               Modalities 10/24/18  10/15  10/17 10/18/18 10/22/18   CP B/L knee declined  declined  declined declined --------

## 2018-10-24 NOTE — PROGRESS NOTES
PT Re-Evaluation     Today's date: 10/24/2018  Patient name: Fang Moses  :   MRN: [id number]  Referring provider: Edwin Ren DO  Dx:   Encounter Diagnosis     ICD-10-CM    1  Status post total bilateral knee replacement Z96 653    2  Primary osteoarthritis of both knees M17 0        Start Time: 0800  Stop Time: 0900  Total time in clinic (min): 60 minutes    Assessment/Plan   Impairments: abnormal gait, abnormal or restricted ROM, activity intolerance, impaired balance, impaired physical strength, lacks appropriate home exercise program and pain with function  Functional limitations: difficulty standing or ambulating longer than 10 mins, difficulty squatting deeply to use toilet, unable to kneel, patient is not driving, and difficulty with up/down the steps  Patient presents with symptom irritability no  Assessment details: Fang Moses is a 68 y o  male with a history of arthritis and LBP that presents for a moderate complexity physical therapy initial evaluation  The patient demonstrates signs and symptoms consistent with B/L knee OA s/p B/L TKA surgery  Has been seen in out patient physical therapy 11 sessions beginning 10/2/18  Findings today show significant improvement with bilateral LE strength and ROM  Extension AROM WNL at this time, patient needs to continue to improve bilateral knee flexion ROM for improved gait mechanics and functional activity  Patient also showing general weakness of bilateral LE with instability requiring continuation of skilled PT to return patient to PLOF  Understanding of Dx/Px/POC: good   Prognosis: good    Goals  STG: Achieve in 4 weeks    1  Patien B/L knee pain at worst less than 2/10 to allow for proper gait  - progressing  2  Patient's B/L knee ROM improve by 20-25 degrees to improve squatting  - met  3  LE MMT improve to > 4/5 all motions tested to improve ADL/recreational activities  - met  4    Patient's TUG score improve to below 14 seconds(fall risk indicator)  - met      LTG:  Achieve in 6-8weeks    1  Patient's knee FOTO score improve by 20% from last assessment  - progressing  2  Patient achieve personal goal of walking 2 blocks without assistive device without knee pain  - progressing  3  Patient to achieve independence with home exercise plan  - progressing  4  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 - not met      Plan  Patient would benefit from: skilled physical therapy  Planned modality interventions: ultrasound, cryotherapy and thermotherapy: hydrocollator packs  Planned therapy interventions: manual therapy, joint mobilization, massage, neuromuscular re-education, patient education, self care, strengthening, stretching, therapeutic exercise, home exercise program, flexibility, functional ROM exercises, balance, balance/weight bearing training and gait training  Frequency: 2-3x week  Duration in visits: 18  Duration in weeks: 8  Plan of Care beginning date: 10/2/2018  Plan of Care expiration date: 11/29/2018  Treatment plan discussed with: patient      Subjective   History of Present Illness  Date of onset: 3/1/2018  Date of surgery: 9/5/2018  Subjective: Patient states he is very happy with progress so far  Able to navigate stairs slowly, but reciprocally  Decrease in overall pain and ability to increase activity  C/o continued restriction in right knee bending making putting on shoes and socks difficult  Episode History: Lane Flynn is a 68 y o  male that presents to outpatient physical therapy with complaints of bilateral knee pain, muscle weakness, and difficulty walking long distances  The patient had a B/L TKA done at [facility] done by Dr Davey Loco  The patient went to acute rehab the day after the surgery and stayed for 13 days  The patient has home care services 1 5 weeks after his rehab stay    The patient denies any issues with infection, blood clots, or falls  The patient reports feeling fatigued and pain at his knees with standing for prolonged periods of time  The patient's main goal for physical therapy is to improve the bend at his right knee for ADL's and improve his strength/endurance to return to work  Patient works at [employer] at [employer] 14 hours/week  He is not back to this job at this time  Not a recurrent problem   Quality of life: good    Pain    10/24  Current pain ratin  2  At best pain ratin  0  At worst pain ratin  4  Location: B/L lateral knee joint line and B/L IT bands  Quality: dull ache, cramping and squeezing  Relieving factors: rest and ice  Aggravating factors: standing, walking, stair climbing and running  Progression: improved    Social Support  Steps to enter house: yes  3  Stairs in house: no   Lives in: trailer  Lives with: spouse    Employment status: not working (employed at [employer] part time)  Hand dominance: right    Treatments  No previous or current treatments  Previous treatment: physical therapy  Current treatment: medication and physical therapy  Discharged from (in last 30 days): inpatient hospitalization and home health care  Patient Goals  Patient goals for therapy: decreased edema, increased strength, return to sport/leisure activities, decreased pain, improved balance, increased motion and return to work  Patient goal: walk 2 blocks without difficulty    Objective   Observations   Left Knee   Positive for adhesive scar  Right Knee   Positive for adhesive scar  Additional Observation Details  The patient has a longitudinal incision at mid line  B/L knees that are closed and scarred  Palpation   Left   No palpable tenderness to the distal biceps femoris, distal semimembranosus, distal semitendinosus, lateral gastrocnemius, medial gastrocnemius, rectus femoris, vastus lateralis and vastus medialis       Right   No palpable tenderness to the distal biceps femoris, distal semimembranosus, distal semitendinosus, lateral gastrocnemius, medial gastrocnemius, rectus femoris and vastus medialis  Tenderness of the vastus lateralis  Tenderness     Right Knee   Tenderness in the ITB  Neurological Testing     Sensation     Knee   Left Knee   Intact: light touch and hot/cold discrimination    Right Knee   Intact: light touch and hot/cold discrimination     Comments: C/o numbness at distal knees bilaterally    Active Range of Motion  10/24  Left Knee   Flexion: 92 degrees   111  Extension: -6 degrees  3    Right Knee   Flexion: 74 degrees   110 pain  Extension: -13 degrees  0    Strength/Myotome Testing  10/24    Left Hip   Planes of Motion   Flexion: 4-    4+  Abduction: 4    4+  Adduction: 4    Right Hip   Planes of Motion   Flexion: 3+    4  Abduction: 4    4  Adduction: 4    Left Knee   Flexion: 3+    4+  Extension: 3+    4+    Right Knee   Flexion: 3+    4  Extension: 3+    4    Tests             10/24    Additional Tests Details  TU seconds          10 sec  Single leg stance:  B/L LOB after 1 second without hands holding onto support  3 sec bilat    Swelling      10/24    Left Knee Girth Measurement (cm)   Joint line: 42 cm    42cm    Right Knee Girth Measurement (cm)   Joint line: 43 cm    42cm    General Comments     Knee Comments  The patient's gait is impaired- No longer uses AD SPC or walker  The patient demonstrates increased medial/lateral sway, decreased heel strike B/L and decreased toe roll with his R LE      Flowsheet Rows      Most Recent Value   PT/OT G-Codes   Current Score  51   Projected Score  55   FOTO information reviewed  Yes   Assessment Type  Re-evaluation   G code set  Mobility: Walking & Moving Around   Mobility: Walking and Moving Around Current Status ()  CK   Mobility: Walking and Moving Around Goal Status ()  CI       Re-Evaluation:  47/59/44 ; MD 18 @ 9:10      Knee Specialty Daily Treatment Diary      Manual 10/24/18  10/15  10/17 10/18/18 10/22/18   B/L Knee(sit) Flex/Ext stretch np  1 min flex/ext L  2 mins flex R  np 3 min ea B/L   Both dir 3 min ea B/L  Both dir   B/L knee ext stretch (supine) performed  3x:30 ea  performed 3x ;30 ea 3x :30 ea   B/L prone quad stretch np R 6x:30   L 3x:30  np 3x :30 ea 3x :  30 ea   Patellar mobs  sup/inf, med/lat bilaterally    sup/inf, med/lat bilaterally np np   Right ITB  Next visit       Supine knee flexion stretch performed  R only 3x30" contract/relax  knee flex and extension bilaterally np np         Exercise Diary  10/24/18  10/15 10/17 10/18/18 10/22/18   Nu step S=13 L4 x 10 min  L3 10min  L4 10 min L4x 10 min L4 x 10 min   Prone QS 10x5" bilat    5"x10 :05x10 ea :05x15   SLR all planes Flex 2x10 bilat    flex 10x bilat Flex 10x B/L Flex 10x B/L   SAQ 3# x 20 B/L  2# 10x3  2# 10x3 bilat 2 5# x30 B/L 3# x 20 B/L   LAQ np    10x bilat x10B/L x10 B/L   HOIST knee flexion stretch np  2 pl 3rd hold x2 min   2pl 3rd hole  X 2min 2pl 3rd hole  X 2 min   Hamstring stretch np  3x30" bilat standing  3x30" bilat standing 3x :30 B/L stand 3x :30 B/L   Calf stretch np  wedge 3x30"  wedge 3x30" Wedge  3x :30 Wedge  3x :30   Standing hamstring curls          heelslides B/L x20 with strap  B/L  10x L  10x R with strap  10x L  10x R  with strap x15 B/L with strap x20 with strap  B/L   Leg press Squat          SLB 2x15 bilat 3x:15   np 2x :15 B/L   Step up XL 2x10  Fwd/lat  bilat  XL x10 ea fwd/lat  XL x10  Ea bilat XL x 10 ea   B/L XL x 10 B/L   Fitter board   1H x20 ea  0H x30 ea 1H x 30 ea x30ea 0H              Total Gym Squat np  3x10  3x10 3x10 3x10   Sidestepping np  25 ftx2  np 25ft x2 25ft x2   Heel toe ambulation np  np  np 25ft x 2 25ft x 2   Prone knee flex np  nv  np x10ea after stretch :05x 10   Mirror ambulation with cues np  25 ft x10  np 25ft x 2 25ft x4   FOTO  completed               Modalities 10/24/18  10/15  10/17 10/18/18 10/22/18   CP B/L knee declined  declined  declined declined --------

## 2018-10-26 ENCOUNTER — OFFICE VISIT (OUTPATIENT)
Dept: PHYSICAL THERAPY | Facility: CLINIC | Age: 73
End: 2018-10-26
Payer: MEDICARE

## 2018-10-26 DIAGNOSIS — M17.0 PRIMARY OSTEOARTHRITIS OF BOTH KNEES: ICD-10-CM

## 2018-10-26 DIAGNOSIS — Z96.653 STATUS POST TOTAL BILATERAL KNEE REPLACEMENT: Primary | ICD-10-CM

## 2018-10-26 PROCEDURE — 97110 THERAPEUTIC EXERCISES: CPT | Performed by: PHYSICAL THERAPIST

## 2018-10-26 PROCEDURE — 97140 MANUAL THERAPY 1/> REGIONS: CPT | Performed by: PHYSICAL THERAPIST

## 2018-10-26 NOTE — PROGRESS NOTES
Daily Note     Today's date: 10/26/2018  Patient name: Ella Torres  :   MRN: [id number]  Referring provider: Lori Dickens DO  Dx:   Encounter Diagnosis     ICD-10-CM    1  Status post total bilateral knee replacement Z96 653    2  Primary osteoarthritis of both knees M17 0        Start Time: 0800  Stop Time: 0900  Total time in clinic (min): 60 minutes    Subjective: Knees feel stiffer than usual this morning 3/10  Thinks it has to do with how he was lying down and legs being slightly twisted from weight of blankets         Objective: See treatment diary below   Re-Evaluation:  18 ; MD 18 @ 9:10      Knee Specialty Daily Treatment Diary      Manual  10/24/18  10/26  10/17 10/18/18 10/22/18   B/L Knee(sit) Flex/Ext stretch np np  np 3 min ea B/L   Both dir 3 min ea B/L  Both dir   B/L knee ext stretch (supine) performed performed  performed 3x ;30 ea 3x :30 ea   B/L prone quad stretch np np  np 3x :30 ea 3x :  30 ea   Patellar mobs  sup/inf, med/lat bilaterally sup/inf, med/lat bilaterally  sup/inf, med/lat bilaterally np np   Right ITB  Next visit performed      Supine knee flexion stretch performed performed  knee flex and extension bilaterally np np   Hamstring/calf stretch   performed            Exercise Diary  10/24/18  10/26 10/17 10/18/18 10/22/18   Nu step S=13 L4 x 10 min  L4 10min  L4 10 min L4x 10 min L4 x 10 min   Prone QS 10x5" bilat  10x5" bilat  5"x10 :05x10 ea :05x15   SLR all planes Flex 2x10 bilat  Flex 2x10 bilat  flex 10x bilat Flex 10x B/L Flex 10x B/L   SAQ 3# x 20 B/L 3# x 30 B/L  2# 10x3 bilat 2 5# x30 B/L 3# x 20 B/L   LAQ np 3x10 bilat  10x bilat x10B/L x10 B/L   HOIST knee flexion stretch np 2pl 3rd hole  X 4 min   2pl 3rd hole  X 2min 2pl 3rd hole  X 2 min   Hamstring stretch np   3x30" bilat standing 3x :30 B/L stand 3x :30 B/L   Calf stretch np   wedge 3x30" Wedge  3x :30 Wedge  3x :30   Standing hamstring curls         heelslides B/L x20 with strap  B/L x20 with strap  B/L  10x L  10x R  with strap x15 B/L with strap x20 with strap  B/L   Leg press Squat         SLB 2x15 bilat    np 2x :15 B/L   Step up XL 2x10  Fwd/lat  bilat XL 2x10  Fwd/lat  bilat  XL x10  Ea bilat XL x 10 ea   B/L XL x 10 B/L   Fitter board  x30 ea  0H x30 ea 1H x 30 ea x30ea 0H             Total Gym Squat np   3x10 3x10 3x10   Sidestepping np   np 25ft x2 25ft x2   Heel toe ambulation np   np 25ft x 2 25ft x 2   Prone knee flex np   np x10ea after stretch :05x 10   Mirror ambulation with cues np   np 25ft x 2 25ft x4   FOTO  completed               Modalities 10/24/18  10/26  10/17 10/18/18 10/22/18   CP B/L knee declined  declined  declined declined --------                     Assessment: Patient shows good progress with decrease in antalgic gait  Noted tightness in right > left ITB, improved with manual, tightness possibly due to abnormal gait mechanics  Plan: Continue per plan of care  Progress treatment as tolerated

## 2018-10-29 ENCOUNTER — OFFICE VISIT (OUTPATIENT)
Dept: PHYSICAL THERAPY | Facility: CLINIC | Age: 73
End: 2018-10-29
Payer: MEDICARE

## 2018-10-29 DIAGNOSIS — M17.0 PRIMARY OSTEOARTHRITIS OF BOTH KNEES: ICD-10-CM

## 2018-10-29 DIAGNOSIS — Z96.653 STATUS POST TOTAL BILATERAL KNEE REPLACEMENT: Primary | ICD-10-CM

## 2018-10-29 PROCEDURE — 97110 THERAPEUTIC EXERCISES: CPT | Performed by: PHYSICAL THERAPIST

## 2018-10-29 PROCEDURE — 97140 MANUAL THERAPY 1/> REGIONS: CPT | Performed by: PHYSICAL THERAPIST

## 2018-10-29 NOTE — PROGRESS NOTES
Daily Note     Today's date: 10/29/2018  Patient name: Chana Ellis  : [id number]  MRN: [id number]  Referring provider: Shauna Phoenix, DO  Dx:   Encounter Diagnosis     ICD-10-CM    1  Status post total bilateral knee replacement Z96 653    2  Primary osteoarthritis of both knees M17 0                   Subjective: The patient reports feeling 3/10 right anterior knee pain today  The patient reports the knee feel stiff and the pain is at the medial and lateral joint line  The patient's left knee has 1/10 pain today        Objective: See treatment diary below   Re-Evaluation:  18 ; MD 18 @ 9:10      Knee Specialty Daily Treatment Diary      Manual  10/24/18  10/26 10/29/18  10/22/18   B/L Knee(sit) Flex/Ext stretch np np np  3 min ea B/L  Both dir   B/L knee ext stretch (supine) performed performed 3x:30 ea  3x :30 ea   B/L prone quad stretch np np np  3x :  30 ea   Patellar mobs  sup/inf, med/lat bilaterally sup/inf, med/lat bilaterally np  np   Right ITB  Next visit performed np     Supine knee flexion stretch performed performed R 4x:45  L 3x:30  np   Hamstring/calf stretch   performed 3x:30 ea           Exercise Diary  10/24/18  10/26 10/29/18  10/22/18   Biodex Bike S=12   90/70  x5 mins     Nu step S=13 L4 x 10 min  L4 10min L4 x5 mins  L4 x 10 min   Prone QS 10x5" bilat  10x5" bilat np  :05x15   SLR all planes Flex 2x10 bilat  Flex 2x10 bilat Flex #1 2x10 B/L  Flex 10x B/L   SAQ 3# x 20 B/L 3# x 30 B/L 4# x 20 B/L  3# x 20 B/L   LAQ np 3x10 bilat  np  x10 B/L   HOIST knee flexion stretch np 2pl 3rd hole  X 4 min 3 pl 4th hole  X 4 mins  2pl 3rd hole  X 2 min   Hamstring stretch np  np  3x :30 B/L   Calf stretch np  Wedge 3x:30  Wedge  3x :30   Standing hamstring curls  ---- -----     heelslides B/L x20 with strap  B/L x20 with strap  B/L R x10 :05  L x20 :05 with strap  x20 with strap  B/L   Leg press Squat        SLB 2x15 bilat  2x:20 B/L  2x :15 B/L   Step up XL 2x10  Fwd/lat bilat XL 2x10  Fwd/lat  bilat np  XL x 10 B/L   Fitter board  x30 ea x20 ea 0H  x30ea 0H            Total Gym Squat np  3x10  3x10   Sidestepping np  25 feet x 2  25ft x2   Heel toe ambulation np  15 feet x 2  25ft x 2   Prone knee flex np    :05x 10   Mirror ambulation with cues np  25 feet x 6  25ft x4   FOTO  completed            Assessment: The patient tolerated all activities well today  The patient needed verbal and manual cues for proper posture and technique to perform the exercises properly  There were no complaints of increased pain or problems after the session today  The patient will benefit from continue skilled physical therapy to progress towards achieving patient centered goals  Plan: Will continue progressing stretching and strengthening of his B/L LE within tolerance of pain

## 2018-10-31 ENCOUNTER — APPOINTMENT (OUTPATIENT)
Dept: PHYSICAL THERAPY | Facility: CLINIC | Age: 73
End: 2018-10-31
Payer: MEDICARE

## 2018-11-02 ENCOUNTER — OFFICE VISIT (OUTPATIENT)
Dept: PHYSICAL THERAPY | Facility: CLINIC | Age: 73
End: 2018-11-02
Payer: MEDICARE

## 2018-11-02 DIAGNOSIS — Z96.653 STATUS POST TOTAL BILATERAL KNEE REPLACEMENT: Primary | ICD-10-CM

## 2018-11-02 DIAGNOSIS — M17.0 PRIMARY OSTEOARTHRITIS OF BOTH KNEES: ICD-10-CM

## 2018-11-02 PROCEDURE — 97110 THERAPEUTIC EXERCISES: CPT | Performed by: PHYSICAL THERAPIST

## 2018-11-02 PROCEDURE — 97140 MANUAL THERAPY 1/> REGIONS: CPT | Performed by: PHYSICAL THERAPIST

## 2018-11-02 NOTE — PROGRESS NOTES
Daily Note     Today's date: 2018  Patient name: Luz Marina Eason  :   MRN: [id number]  Referring provider: Margaret Powell DO  Dx:   Encounter Diagnosis     ICD-10-CM    1  Status post total bilateral knee replacement Z96 653    2  Primary osteoarthritis of both knees M17 0                   Subjective: The patient complains of 2/10 right knee pain at anterior joint line  The patient reports his left knee pain is very mild today  The patient reports he "always" has right knee pain        Objective: See treatment diary below  Re-Evaluation:  18 ; MD 18 @ 9:10      Knee Specialty Daily Treatment Diary      Manual  10/24/18  10/26 10/29/18 11/2/18    B/L Knee(sit) Flex/Ext stretch np np np -----    B/L knee ext stretch (supine) performed performed 3x:30 ea 3x:30 ea    B/L prone quad stretch np np np -----    Patellar mobs  sup/inf, med/lat bilaterally sup/inf, med/lat bilaterally np performed    Right ITB  Next visit performed np -----    Supine knee flexion stretch performed performed R 4x:45  L 3x:30 R 4x 1min  L 3x:30    Hamstring/calf stretch   performed 3x:30 ea -------          Exercise Diary  10/24/18  10/26 10/29/18 11/2/18    Biodex Bike S=12   90/70  x5 mins 90/70  x5 mins    Nu step S=13 L4 x 10 min  L4 10min L4 x5 mins L4 x 5 mins    Prone QS 10x5" bilat  10x5" bilat np     SLR all planes Flex 2x10 bilat  Flex 2x10 bilat Flex #1 2x10 B/L Flex #1 x25B/L    SAQ 3# x 20 B/L 3# x 30 B/L 4# x 20 B/L B/L #4 x25    LAQ np 3x10 bilat  np -----    HOIST knee flexion stretch np 2pl 3rd hole  X 4 min 3 pl 4th hole  X 4 mins 3pl 4th hole  x    Hamstring stretch np  np 4x:30 B/L    Calf stretch np  Wedge 3x:30 Wedge  3x:30    Standing hamstring curls  ---- ----- -----    heelslides B/L x20 with strap  B/L x20 with strap  B/L R x10 :05  L x20 :05 with strap ------    Leg press Squat    -----    SLB 2x15 bilat  2x:20 B/L -----    Step up XL 2x10  Fwd/lat  bilat XL 2x10  Fwd/lat bilat np Outside bars XL x10 ea fwd/lat    Fitter board  x30 ea x20 ea 0H x30 0H             Total Gym Squat np  3x10 4x10    Sidestepping np  25 feet x 2 25 feet x 2    Heel toe ambulation np  15 feet x 2 15 feet x 4    Prone knee flex np       Mirror ambulation with cues np  25 feet x 6 25 feet x 6    FOTO  completed              Assessment: The patient's right knee is still more tight than the left knee despite spending more time stretching the right knee  The patient's gait is steadily improving but he still needs verbal cues to avoid swaying medial and laterally  The patient reports now being able to don a sock on his right foot which he was unable to do previously  The patient will benefit from continued PT to improve his strength, ROM, gait, and function  Plan: Focus on improving patient's right knee ROM and gait mechanics

## 2018-11-05 ENCOUNTER — OFFICE VISIT (OUTPATIENT)
Dept: PHYSICAL THERAPY | Facility: CLINIC | Age: 73
End: 2018-11-05
Payer: MEDICARE

## 2018-11-05 DIAGNOSIS — Z96.653 STATUS POST TOTAL BILATERAL KNEE REPLACEMENT: Primary | ICD-10-CM

## 2018-11-05 DIAGNOSIS — M17.0 PRIMARY OSTEOARTHRITIS OF BOTH KNEES: ICD-10-CM

## 2018-11-05 PROCEDURE — 97110 THERAPEUTIC EXERCISES: CPT | Performed by: PHYSICAL THERAPIST

## 2018-11-05 PROCEDURE — 97140 MANUAL THERAPY 1/> REGIONS: CPT | Performed by: PHYSICAL THERAPIST

## 2018-11-05 NOTE — PROGRESS NOTES
Daily Note     Today's date: 2018  Patient name: Yuliya Ibarra  :   MRN: [id number]  Referring provider: Amber Maravilla DO  Dx:   Encounter Diagnosis     ICD-10-CM    1  Status post total bilateral knee replacement Z96 653    2  Primary osteoarthritis of both knees M17 0                   Subjective: The patient complains of 2/10 right anterior knee pain this morning  The patient started using a knee extension stretching device at home which was helpful to the patient - it helped him relax to go to sleep  The patient was educated to use the device to provide a low load and more prolonged stretch  He was only holding the stretch for 1 minute  Patient was advised to work towards 10-15 minute holds         Objective: See treatment diary below  Re-Evaluation:  18 ; MD 18 @ 9:10      Knee Specialty Daily Treatment Diary      Manual    10/26 10/29/18 11/2/18 11/5/18   B/L Knee(sit) Flex/Ext stretch  np np ----- Right sit  X 2 mins   B/L knee ext stretch (supine)  performed 3x:30 ea 3x:30 ea 3x:30 ea   B/L prone quad stretch  np np -----    Patellar mobs  sup/inf, med/lat bilaterally np performed performed   Right ITB   performed np -----    Supine knee flexion stretch  performed R 4x:45  L 3x:30 R 4x 1min  L 3x:30 R 4 x 1 min   Hamstring/calf stretch   performed 3x:30 ea ------- 3x:30 ea         Exercise Diary    10/26 10/29/18 11/2/18 11/5/18   Biodex Bike S=11   90/70  x5 mins 90/70  x5 mins 90/70   X 5 mins   Nu step S=12   L4 10min L4 x5 mins L4 x 5 mins L4 x 5 min   Prone QS   10x5" bilat np     SLR all planes   Flex 2x10 bilat Flex #1 2x10 B/L Flex #1 x25B/L Flex #1 x 30   SAQ  3# x 30 B/L 4# x 20 B/L B/L #4 x25 B/L #4 x 30   LAQ  3x10 bilat  np -----    HOIST knee flexion stretch  2pl 3rd hole  X 4 min 3 pl 4th hole  X 4 mins 3pl 4th hole  X 5 mins    Hamstring stretch   np 4x:30 B/L -------   Calf stretch   Wedge 3x:30 Wedge  3x:30 Wedge  3x:30   Standing hamstring curls  ---- ----- -----    heelslides B/L  x20 with strap  B/L R x10 :05  L x20 :05 with strap ------ R x20 :10  L x15 :10  With strap   Leg press Squat    -----    SLB   2x:20 B/L ----- 2x:20   Step up  XL 2x10  Fwd/lat  bilat np Outside bars XL x10 ea fwd/lat XL x15 ea   Fitter board  x30 ea x20 ea 0H x30 0H x30 0H            Total Gym Squat   3x10 4x10    Sidestepping   25 feet x 2 25 feet x 2 25 feet x 2   Heel toe ambulation   15 feet x 2 15 feet x 4 15 feet x 4   Prone knee flex        Mirror ambulation with cues   25 feet x 6 25 feet x 6 25 feet x 6   FOTO             Assessment: The patient's right knee continues to be tight compared to his opposite side  We have increased our time stretching the right knee to overcome the discrepancy between the two sides  The patient's gait is steadily improving with less medial/lateral sway and improved knee flexion throughout the gait cycle  The patient continues to have difficulty with proprioceptive activities    The patient will benefit from continued PT to achieve his goals of therapy      Plan: Continue with increased stretching at right knee and progress proprioception to tolerance

## 2018-11-08 ENCOUNTER — OFFICE VISIT (OUTPATIENT)
Dept: PHYSICAL THERAPY | Facility: CLINIC | Age: 73
End: 2018-11-08
Payer: MEDICARE

## 2018-11-08 DIAGNOSIS — Z96.653 STATUS POST TOTAL BILATERAL KNEE REPLACEMENT: Primary | ICD-10-CM

## 2018-11-08 DIAGNOSIS — M17.0 PRIMARY OSTEOARTHRITIS OF BOTH KNEES: ICD-10-CM

## 2018-11-08 PROCEDURE — 97110 THERAPEUTIC EXERCISES: CPT | Performed by: PHYSICAL THERAPIST

## 2018-11-08 PROCEDURE — 97140 MANUAL THERAPY 1/> REGIONS: CPT | Performed by: PHYSICAL THERAPIST

## 2018-11-08 NOTE — PROGRESS NOTES
Daily Note     Today's date: 2018  Patient name: Mitul Boogie  :   MRN: [id number]  Referring provider: León Barton DO  Dx:   Encounter Diagnosis     ICD-10-CM    1  Status post total bilateral knee replacement Z96 653    2  Primary osteoarthritis of both knees M17 0                   Subjective: The patient complains of 2/10 right knee pain and stiffness  Last night was the first night I got some good sleep because I am able to do more walking during the day        Objective: See treatment diary below  Re-Evaluation:  18 ; MD 18 @ 9:10      Knee Specialty Daily Treatment Diary      Manual  11/8/18  10/29/18 11/2/18 11/5/18   B/L Knee(sit) Flex/Ext stretch Right sit  Flex only x2mins  np ----- Right sit  X 2 mins   B/L knee ext stretch (supine) 3x:30 ea  3x:30 ea 3x:30 ea 3x:30 ea   B/L prone quad stretch R 4x:30  L 3x:30  np -----    Patellar mobs Home ex  np performed performed   Right ITB    np -----    Supine knee flexion stretch R 4x 1 min  L3x:30  R 4x:45  L 3x:30 R 4x 1min  L 3x:30 R 4 x 1 min  L 3x:30   Hamstring/calf stretch    3x:30 ea ------- 3x:30 ea         Exercise Diary  11/8/18  10/29/18 11/2/18 11/5/18   Biodex Bike S=11 90/70  x5 mins  90/70  x5 mins 90/70  x5 mins 90/70   X 5 mins   Nu step S=12 L4 x5 mins  L4 x5 mins L4 x 5 mins L4 x 5 min   Prone QS   np     SLR all planes   Flex #1 2x10 B/L Flex #1 x25B/L Flex #1 x 30   SAQ B/L 5# x20  4# x 20 B/L B/L #4 x25 B/L #4 x 30   LAQ ----   np -----    HOIST knee flexion stretch 3pl 4th hole  3 pl 4th hole  X 4 mins 3pl 4th hole  X 5 mins    Hamstring stretch   np 4x:30 B/L -------   Calf stretch   Wedge 3x:30 Wedge  3x:30 Wedge  3x:30   Standing hamstring curls ------  ----- -----    heelslides B/L -------  R x10 :05  L x20 :05 with strap ------ R x20 :10  L x15 :10  With strap   Leg press Squat -----   -----    SLB Home ex  2x:20 B/L ----- 2x:20   Step up   np Outside bars XL x10 ea fwd/lat XL x15 ea Fitter board x20 ea 0H  x20 ea 0H x30 0H x30 0H            Total Gym Squat 3x10  3x10 4x10    Sidestepping   25 feet x 2 25 feet x 2 25 feet x 2   Heel toe ambulation   15 feet x 2 15 feet x 4 15 feet x 4   Prone knee flex        Mirror ambulation with cues   25 feet x 6 25 feet x 6 25 feet x 6   FOTO            Assessment: The patient tolerated all activities well today  The patient needed verbal and manual cues for proper posture and technique to perform the exercises properly  There were no complaints of increased pain or problems after the session today  The patient asked to focus more on his stretching and he would like to do his exercises at home  The patient will benefit from continue skilled physical therapy to progress towards achieving patient centered goals  Plan: Continue per plan of care  Progress treatment as tolerated

## 2018-11-12 ENCOUNTER — OFFICE VISIT (OUTPATIENT)
Dept: PHYSICAL THERAPY | Facility: CLINIC | Age: 73
End: 2018-11-12
Payer: MEDICARE

## 2018-11-12 DIAGNOSIS — Z96.653 STATUS POST TOTAL BILATERAL KNEE REPLACEMENT: Primary | ICD-10-CM

## 2018-11-12 DIAGNOSIS — M17.0 PRIMARY OSTEOARTHRITIS OF BOTH KNEES: ICD-10-CM

## 2018-11-12 PROCEDURE — 97110 THERAPEUTIC EXERCISES: CPT | Performed by: PHYSICAL THERAPIST

## 2018-11-12 PROCEDURE — 97140 MANUAL THERAPY 1/> REGIONS: CPT | Performed by: PHYSICAL THERAPIST

## 2018-11-12 NOTE — PROGRESS NOTES
Daily Note     Today's date: 2018  Patient name: Haile Paige  :   MRN: [id number]  Referring provider: Sloane Pulliam DO  Dx:   Encounter Diagnosis     ICD-10-CM    1  Status post total bilateral knee replacement Z96 653    2  Primary osteoarthritis of both knees M17 0                   Subjective: The patient reports feeling better this morning    "I think my right knee is bending more "      Objective: See treatment diary below  Re-Evaluation:  18 ; MD 18 @ 9:10      Knee Specialty Daily Treatment Diary      Manual  18   B/L Knee(sit) Flex/Ext stretch Right sit  Flex only x2mins Right only sit  x2 mins  ----- Right sit  X 2 mins   B/L knee ext stretch (supine) 3x:30 ea 3x:30 ea  3x:30 ea 3x:30 ea   B/L prone quad stretch R 4x:30  L 3x:30 R 4x:30  L 3x:30  -----    Patellar mobs Home ex   performed performed   Right ITB     -----    Supine knee flexion stretch R 4x 1 min  L3x:30 R 4x 1 min  L: 3x:30  R 4x 1min  L 3x:30 R 4 x 1 min  L 3x:30   Hamstring/calf stretch     ------- 3x:30 ea         Exercise Diary  18   Biodex Bike S=11 90/70  x5 mins 90/70  x5 mins  90/70  x5 mins 90/70   X 5 mins   Nu step S=12 L4 x5 mins L4 x 5 mins  L4 x 5 mins L4 x 5 min   Prone QS  ------      SLR all planes  1# x 20 B/L  Flex #1 x25B/L Flex #1 x 30   SAQ B/L 5# x20 B/L 5# x25  B/L #4 x25 B/L #4 x 30   LAQ ---- -------  -----    HOIST knee flexion stretch 3pl 4th hole  x5 mins 3 pl 4th hole  x5mins  3pl 4th hole  X 5 mins    Hamstring stretch    4x:30 B/L -------   Calf stretch    Wedge  3x:30 Wedge  3x:30   Standing hamstring curls ------   -----    heelslides B/L -------   ------ R x20 :10  L x15 :10  With strap   Leg press Squat -----   -----    SLB Home ex   ----- 2x:20   Step up    Outside bars XL x10 ea fwd/lat XL x15 ea   Fitter board x20 ea 0H x20 ea 0H  x30 0H x30 0H            Total Gym Squat 3x10 3x10  4x10 Sidestepping  ----  25 feet x 2 25 feet x 2   Heel toe ambulation  -----  15 feet x 4 15 feet x 4   Prone knee flex        Mirror ambulation with cues  ------  25 feet x 6 25 feet x 6   FOTO            Assessment: The patient feels less stiffness and numbness at his B/L knees this week  He reports now being able to sleep 4 hours at a time prior to waking to reposition his knees which is a significant improvement compared to last month  The patient will benefit from continued PT to normalize his ROM/strength to achieve his functional goals  Plan: Continue focusing on improving his right knee ROM and proprioception

## 2018-11-16 ENCOUNTER — OFFICE VISIT (OUTPATIENT)
Dept: PHYSICAL THERAPY | Facility: CLINIC | Age: 73
End: 2018-11-16
Payer: MEDICARE

## 2018-11-16 DIAGNOSIS — M17.0 PRIMARY OSTEOARTHRITIS OF BOTH KNEES: ICD-10-CM

## 2018-11-16 DIAGNOSIS — Z96.653 STATUS POST TOTAL BILATERAL KNEE REPLACEMENT: Primary | ICD-10-CM

## 2018-11-16 PROCEDURE — 97110 THERAPEUTIC EXERCISES: CPT | Performed by: PHYSICAL THERAPIST

## 2018-11-16 PROCEDURE — 97140 MANUAL THERAPY 1/> REGIONS: CPT | Performed by: PHYSICAL THERAPIST

## 2018-11-16 NOTE — PROGRESS NOTES
Daily Note     Today's date: 2018  Patient name: Mckenna Stein  : [id number]  MRN: [id number]  Referring provider: Gely Rodriges DO  Dx:   Encounter Diagnosis     ICD-10-CM    1  Status post total bilateral knee replacement Z96 653    2  Primary osteoarthritis of both knees M17 0                   Subjective: The patient is c/o 1/10 pain at his right knee this morning   " I went back to work this week and after 2 hours of standing I had to sit down and take a rest "  The patient reports being fine as long as he sits every once in awhile        Objective: See treatment diary below  Re-Evaluation:  18 ; MD 18 @ 9:10      Knee Specialty Daily Treatment Diary      Manual  18   B/L Knee(sit) Flex/Ext stretch Right sit  Flex only x2mins Right only sit  x2 mins Right only  X 2mins  Right sit  X 2 mins   B/L knee ext stretch (supine) 3x:30 ea 3x:30 ea 3x:30 ea  3x:30 ea   B/L prone quad stretch R 4x:30  L 3x:30 R 4x:30  L 3x:30 R 4x:30  L 3x:30     Patellar mobs Home ex    performed   Right ITB         Supine knee flexion stretch R 4x 1 min  L3x:30 R 4x 1 min  L: 3x:30 R 4x 1 min  L: 3x:30  R 4 x 1 min  L 3x:30   Hamstring/calf stretch      3x:30 ea         Exercise Diary  18   Biodex Bike S=11 90/70  x5 mins 90/70  x5 mins 90/70  x5 mins  90/70   X 5 mins   Nu step S=12 L4 x5 mins L4 x 5 mins L4 x 5 mins  L4 x 5 min   Prone QS  ------      SLR all planes  1# x 20 B/L 1 5# x25 ea  Flex #1 x 30   SAQ B/L 5# x20 B/L 5# x25 B/L 5# x30  B/L #4 x 30   LAQ ---- -------      HOIST knee flexion stretch 3pl 4th hole  x5 mins 3 pl 4th hole  x5mins 3 pl 4th hole  X 5 mins     Hamstring stretch     -------   Calf stretch     Wedge  3x:30   Standing hamstring curls ------       heelslides B/L -------    R x20 :10  L x15 :10  With strap   Leg press Squat -----       SLB Home ex    2x:20   Step up     XL x15 ea   Fitter board x20 ea 0H x20 ea 0H X 30ea 0H  x30 0H            Total Gym Squat 3x10 3x10 3x10     Sidestepping  ----   25 feet x 2   Heel toe ambulation  -----   15 feet x 4   Prone knee flex        Mirror ambulation with cues  ------   25 feet x 6   FOTO            Assessment: The patient tolerated all activities well today  The patient needed verbal and manual cues for proper posture and technique to perform the exercises properly  There were no complaints of increased pain or problems after the session today  The patient will benefit from continued skilled physical therapy to progress towards achieving patient centered goals  Plan: Continue per plan of care  Progress treatment as tolerated

## 2018-11-19 ENCOUNTER — OFFICE VISIT (OUTPATIENT)
Dept: PHYSICAL THERAPY | Facility: CLINIC | Age: 73
End: 2018-11-19
Payer: MEDICARE

## 2018-11-19 DIAGNOSIS — M17.0 PRIMARY OSTEOARTHRITIS OF BOTH KNEES: ICD-10-CM

## 2018-11-19 DIAGNOSIS — Z96.653 STATUS POST TOTAL BILATERAL KNEE REPLACEMENT: Primary | ICD-10-CM

## 2018-11-19 PROCEDURE — 97140 MANUAL THERAPY 1/> REGIONS: CPT

## 2018-11-19 PROCEDURE — 97110 THERAPEUTIC EXERCISES: CPT

## 2018-11-23 ENCOUNTER — EVALUATION (OUTPATIENT)
Dept: PHYSICAL THERAPY | Facility: CLINIC | Age: 73
End: 2018-11-23
Payer: MEDICARE

## 2018-11-23 DIAGNOSIS — M17.0 PRIMARY OSTEOARTHRITIS OF BOTH KNEES: ICD-10-CM

## 2018-11-23 DIAGNOSIS — Z96.653 STATUS POST TOTAL BILATERAL KNEE REPLACEMENT: Primary | ICD-10-CM

## 2018-11-23 PROCEDURE — G8980 MOBILITY D/C STATUS: HCPCS | Performed by: PHYSICAL THERAPIST

## 2018-11-23 PROCEDURE — 97140 MANUAL THERAPY 1/> REGIONS: CPT | Performed by: PHYSICAL THERAPIST

## 2018-11-23 PROCEDURE — G8979 MOBILITY GOAL STATUS: HCPCS | Performed by: PHYSICAL THERAPIST

## 2018-11-23 PROCEDURE — 97110 THERAPEUTIC EXERCISES: CPT | Performed by: PHYSICAL THERAPIST

## 2018-11-23 NOTE — PROGRESS NOTES
PT Re-Evaluation  and PT Discharge    Today's date: 2018  Patient name: Ella Torres  : 1945  MRN: [id number]  Referring provider: Lori Dickens DO  Dx:   Encounter Diagnosis     ICD-10-CM    1  Status post total bilateral knee replacement Z96 653    2  Primary osteoarthritis of both knees M17 0                   Assessment/Plan     Assessment details: Ella Torres is a 68 y o  male status post B/L TKA who has attended 20 sessions of skilled physical therapy  The patient has achieved his goals of therapy at this time  The patient is back to work and is also tolerating walking for 2 blocks without assistive device  The patient is independent with his home exercises and would like to be discharged from formal physical therapy to a home program ,  Understanding of Dx/Px/POC: good   Prognosis: good    Goals  STG: Achieve in 4 weeks    1  Patien B/L knee pain at worst less than 2/10 to allow for proper gait  - MET  2  Patient's B/L knee ROM improve by 20-25 degrees to improve squatting  - met  3  LE MMT improve to > 4/5 all motions tested to improve ADL/recreational activities  - met  4  Patient's TUG score improve to below 14 seconds(fall risk indicator)  - met      LTG:  Achieve in 6-8weeks    1  Patient's knee FOTO score improve by 20% from last assessment - MET  3  Patient to achieve independence with home exercise plan  - MET  4  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 - MET      Plan  Discharge outpatient PT to an independent [facility] at this time    Treatment plan discussed with: patient      Subjective   History of Present Illness  Date of onset: 3/1/2018  Date of surgery: 2018    Subjective: The patient is now able to walk 2 blocks without difficulty and has returned to work at this time  The patient is independent with his home exercise plan         Episode History: Ella Torres is a 68 y o  male that presents to outpatient physical therapy with complaints of bilateral knee pain, muscle weakness, and difficulty walking long distances  The patient had a B/L TKA done at [facility] done by Dr Maggi Pearson  The patient went to acute rehab the day after the surgery and stayed for 13 days  The patient has home care services 1 5 weeks after his rehab stay  The patient denies any issues with infection, blood clots, or falls  The patient reports feeling fatigued and pain at his knees with standing for prolonged periods of time  The patient's main goal for physical therapy is to improve the bend at his right knee for ADL's and improve his strength/endurance to return to work  Patient works at [employer] at [employer] 14 hours/week  Patient is back to work 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 [employer] at [email or web address]  Not a recurrent problem   Quality of life: good    Pain    10/24   11/23  Current pain ratin  2   0  At best pain ratin  0   0   At worst pain ratin  4   2  Location: B/L lateral knee joint line and B/L IT bands  Quality: dull ache, cramping and squeezing  Relieving factors: rest and ice  Aggravating factors: standing, walking, stair climbing and running  Progression: improved    Social Support  Steps to enter house: yes  3  Stairs in house: no   Lives in: trailer  Lives with: spouse    Employment status: not working (employed at [employer] part time)  Hand dominance: right    Objective   Observations   Left Knee   Positive for adhesive scar  Right Knee   Positive for adhesive scar  Additional Observation Details  The patient has a longitudinal incision at mid line  B/L knees that are closed and scarred  Palpation   Left   No palpable tenderness to the distal biceps femoris, distal semimembranosus, distal semitendinosus, lateral gastrocnemius, medial gastrocnemius, rectus femoris, vastus lateralis and vastus medialis       Right   No palpable tenderness to the distal biceps femoris, distal semimembranosus, distal semitendinosus, lateral gastrocnemius, medial gastrocnemius, rectus femoris and vastus medialis  Tenderness of the vastus lateralis  Tenderness     Right Knee   Tenderness in the ITB       Neurological Testing     Sensation     Knee   Left Knee   Intact: light touch and hot/cold discrimination    Right Knee   Intact: light touch and hot/cold discrimination     Comments: C/o numbness at distal knees bilaterally    Active Range of Motion  10/24  11/23  Left Knee   Flexion: 92 degrees   111  115  Extension: -6 degrees  3  0    Right Knee   Flexion: 74 degrees   110 pain 112  Extension: -13 degrees  0  -3    Strength/Myotome Testing  10/24  11/23    Left Hip   Planes of Motion   Flexion: 4-    4+  5  Abduction: 4    4+  5  Adduction: 4    Right Hip   Planes of Motion   Flexion: 3+    4  4+  Abduction: 4    4  4+  Adduction: 4    Left Knee   Flexion: 3+    4+  5  Extension: 3+    4+  5    Right Knee   Flexion: 3+    4  4+  Extension: 3+    4  4+    Tests             10/24       Additional Tests Details  TU seconds          10 sec  Single leg stance:  B/L LOB after 1 second without hands holding onto support  3 sec bilat    Swelling      10/24    Left Knee Girth Measurement (cm)   Joint line: 42 cm    42cm    Right Knee Girth Measurement (cm)   Joint line: 43 cm    42cm    General Comments     Knee Comments      Flowsheet Rows      Most Recent Value   PT/OT G-Codes   Current Score  66   Projected Score  55   FOTO information reviewed  Yes   Assessment Type  Discharge   G code set  Mobility: Walking & Moving Around   Mobility: Walking and Moving Around Goal Status ()  CI   Mobility: Walking and Moving Around Discharge Status ()  CJ       Re-Evaluation:  18 ; MD 18 @ 9:10      Knee Specialty Daily Treatment Diary      Manual  18   B/L Knee(sit) Flex/Ext stretch Right sit  Flex only x2mins Right only sit  x2 mins Right only  X 2mins Right only  X 2 min Right x 2 mins   B/L knee ext stretch (supine) 3x:30 ea 3x:30 ea 3x:30 ea 3x :30 ea 3x:30 ea   B/L prone quad stretch R 4x:30  L 3x:30 R 4x:30  L 3x:30 R 4x:30  L 3x:30 R 4 :30x 3  L 3:30 R 4x:30  L 3x:30   Patellar mobs Home ex               Supine knee flexion stretch R 4x 1 min  L3x:30 R 4x 1 min  L: 3x:30 R 4x 1 min  L: 3x:30 R 4x 1 min  L :30x 3 R 4 x 1 min  L 3x:30                 Exercise Diary  11/8/18 11/12/18 11/16/18 11/19/18 11/23/18   Biodex Bike S=11 90/70  x5 mins 90/70  x5 mins 90/70  x5 mins 90/70 x  5 min 90/70  x5 mins   Nu step S=12 L4 x5 mins L4 x 5 mins L4 x 5 mins L4 x5 min L4 x 5mins           SLR all planes  1# x 20 B/L 1 5# x25 ea 1 5# x 30 ea 1 5# x30   SAQ B/L 5# x20 B/L 5# x25 B/L 5# x30 B/L 5 # x 30 B/L #5 x 30           HOIST knee flexion stretch 3pl 4th hole  x5 mins 3 pl 4th hole  x5mins 3 pl 4th hole  X 5 mins 3 pl 4th hole  X 5 mins 3 pl  4 th hole  x5 mins                                           SLB Home ex               Fitter board x20 ea 0H x20 ea 0H X 30ea 0H X 30 ea 0H x30            Total Gym Squat 3x10 3x10 3x10 3x10 3x10                                   FOTO

## 2018-11-23 NOTE — LETTER
2018    Sidney Saucedo DO  [address]  [address] [facility] [address]    Patient: Maria C Barrios   YOB: 1945   Date of Visit: 2018     Encounter Diagnosis     ICD-10-CM    1  Status post total bilateral knee replacement Z96 653    2  Primary osteoarthritis of both knees M17 0        Dear Dr Sam Krishnan:    Please review the attached Plan of Care from [facility] recent visit  Please verify that you agree therapy should continue by signing the attached document and sending it back to our office  If you have any questions or concerns, please don't hesitate to call  Sincerely,    Brian Linn, PT      Referring Provider:      I certify that I have read the below Plan of Care and certify the need for these services furnished under this plan of treatment while under my care  Sidney Saucedo DO  [address]  [address] [facility] 9 [facility] 80: [phone]          PT Re-Evaluation  and PT Discharge    Today's date: 2018  Patient name: Maria C Barrios  : 1945  MRN: [id number]  Referring provider: oNra Lau DO  Dx:   Encounter Diagnosis     ICD-10-CM    1  Status post total bilateral knee replacement Z96 653    2  Primary osteoarthritis of both knees M17 0                   Assessment/Plan     Assessment details: Maria C Barrios is a 68 y o  male status post B/L TKA who has attended 20 sessions of skilled physical therapy  The patient has achieved his goals of therapy at this time  The patient is back to work and is also tolerating walking for 2 blocks without assistive device  The patient is independent with his home exercises and would like to be discharged from formal physical therapy to a home program ,  Understanding of Dx/Px/POC: good   Prognosis: good    Goals  STG: Achieve in 4 weeks    1  Patien B/L knee pain at worst less than 2/10 to allow for proper gait  - MET  2    Patient's B/L knee ROM improve by 20-25 degrees to improve squatting  - met  3  LE MMT improve to > 4/5 all motions tested to improve ADL/recreational activities  - met  4  Patient's TUG score improve to below 14 seconds(fall risk indicator)  - met      LTG:  Achieve in 6-8weeks    1  Patient's knee FOTO score improve by 20% from last assessment - MET  3  Patient to achieve independence with home exercise plan  - MET  4  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 - MET      Plan  Discharge outpatient PT to an independent [facility] at this time    Treatment plan discussed with: patient      Subjective   History of Present Illness  Date of onset: 3/1/2018  Date of surgery: 2018    Subjective: The patient is now able to walk 2 blocks without difficulty and has returned to work at this time  The patient is independent with his home exercise plan  Episode History: Nitish Salgado is a 68 y o  male that presents to outpatient physical therapy with complaints of bilateral knee pain, muscle weakness, and difficulty walking long distances  The patient had a B/L TKA done at [facility] done by Dr Liliam Knight  The patient went to acute rehab the day after the surgery and stayed for 13 days  The patient has home care services 1 5 weeks after his rehab stay  The patient denies any issues with infection, blood clots, or falls  The patient reports feeling fatigued and pain at his knees with standing for prolonged periods of time  The patient's main goal for physical therapy is to improve the bend at his right knee for ADL's and improve his strength/endurance to return to work  Patient works at [employer] at [employer] 14 hours/week  Patient is back to work 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 [employer] at [employer]      Not a recurrent problem   Quality of life: good    Pain    10/24   11/23  Current pain ratin  2   0  At best pain ratin  0   0   At worst pain rating: 6  4   2  Location: B/L lateral knee joint line and B/L IT bands  Quality: dull ache, cramping and squeezing  Relieving factors: rest and ice  Aggravating factors: standing, walking, stair climbing and running  Progression: improved    Social Support  Steps to enter house: yes  3  Stairs in house: no   Lives in: trailer  Lives with: spouse    Employment status: not working (employed at [employer] part time)  Hand dominance: right    Objective   Observations   Left Knee   Positive for adhesive scar  Right Knee   Positive for adhesive scar  Additional Observation Details  The patient has a longitudinal incision at mid line  B/L knees that are closed and scarred  Palpation   Left   No palpable tenderness to the distal biceps femoris, distal semimembranosus, distal semitendinosus, lateral gastrocnemius, medial gastrocnemius, rectus femoris, vastus lateralis and vastus medialis  Right   No palpable tenderness to the distal biceps femoris, distal semimembranosus, distal semitendinosus, lateral gastrocnemius, medial gastrocnemius, rectus femoris and vastus medialis  Tenderness of the vastus lateralis  Tenderness     Right Knee   Tenderness in the ITB       Neurological Testing     Sensation     Knee   Left Knee   Intact: light touch and hot/cold discrimination    Right Knee   Intact: light touch and hot/cold discrimination     Comments: C/o numbness at distal knees bilaterally    Active Range of Motion  10/24  11/23  Left Knee   Flexion: 92 degrees   111  115  Extension: -6 degrees  3  0    Right Knee   Flexion: 74 degrees   110 pain 112  Extension: -13 degrees  0  -3    Strength/Myotome Testing  10/24  11/23    Left Hip   Planes of Motion   Flexion: 4-    4+  5  Abduction: 4    4+  5  Adduction: 4    Right Hip   Planes of Motion   Flexion: 3+    4  4+  Abduction: 4    4  4+  Adduction: 4    Left Knee   Flexion: 3+    4+  5  Extension: 3+    4+  5    Right Knee   Flexion: 3+    4  4+  Extension: 3+    4  4+    Tests             10/24       Additional Tests Details  TU seconds          10 sec  Single leg stance:  B/L LOB after 1 second without hands holding onto support  3 sec bilat    Swelling      10/24    Left Knee Girth Measurement (cm)   Joint line: 42 cm    42cm    Right Knee Girth Measurement (cm)   Joint line: 43 cm    42cm    General Comments     Knee Comments      Flowsheet Rows      Most Recent Value   PT/OT G-Codes   Current Score  66   Projected Score  55   FOTO information reviewed  Yes   Assessment Type  Discharge   G code set  Mobility: Walking & Moving Around   Mobility: Walking and Moving Around Goal Status ()  CI   Mobility: Walking and Moving Around Discharge Status ()  CJ       Re-Evaluation:  18 ; MD 18 @ 9:10      Knee Specialty Daily Treatment Diary      Manual  18   B/L Knee(sit) Flex/Ext stretch Right sit  Flex only x2mins Right only sit  x2 mins Right only  X 2mins Right only  X 2 min Right x 2 mins   B/L knee ext stretch (supine) 3x:30 ea 3x:30 ea 3x:30 ea 3x :30 ea 3x:30 ea   B/L prone quad stretch R 4x:30  L 3x:30 R 4x:30  L 3x:30 R 4x:30  L 3x:30 R 4 :30x 3  L 3:30 R 4x:30  L 3x:30   Patellar mobs Home ex               Supine knee flexion stretch R 4x 1 min  L3x:30 R 4x 1 min  L: 3x:30 R 4x 1 min  L: 3x:30 R 4x 1 min  L :30x 3 R 4 x 1 min  L 3x:30                 Exercise Diary  18   Biodex Bike S=11 90/70  x5 mins 90/70  x5 mins 90/70  x5 mins 90/70 x  5 min 90/70  x5 mins   Nu step S=12 L4 x5 mins L4 x 5 mins L4 x 5 mins L4 x5 min L4 x 5mins           SLR all planes  1# x 20 B/L 1 5# x25 ea 1 5# x 30 ea 1 5# x30   SAQ B/L 5# x20 B/L 5# x25 B/L 5# x30 B/L 5 # x 30 B/L #5 x 30           HOIST knee flexion stretch 3pl 4th hole  x5 mins 3 pl 4th hole  x5mins 3 pl 4th hole  X 5 mins 3 pl 4th hole  X 5 mins 3 pl  4 th hole  x5 mins SLB Home ex               Fitter board x20 ea 0H x20 ea 0H X 30ea 0H X 30 ea 0H x30            Total Gym Squat 3x10 3x10 3x10 3x10 3x10                                   FOTO

## 2018-11-27 ENCOUNTER — TRANSCRIBE ORDERS (OUTPATIENT)
Dept: PHYSICAL THERAPY | Facility: CLINIC | Age: 73
End: 2018-11-27

## 2019-12-31 ENCOUNTER — OFFICE VISIT (OUTPATIENT)
Dept: CARDIOLOGY CLINIC | Facility: CLINIC | Age: 74
End: 2019-12-31
Payer: MEDICARE

## 2019-12-31 VITALS
HEART RATE: 80 BPM | RESPIRATION RATE: 18 BRPM | SYSTOLIC BLOOD PRESSURE: 145 MMHG | OXYGEN SATURATION: 93 % | BODY MASS INDEX: 33.64 KG/M2 | DIASTOLIC BLOOD PRESSURE: 85 MMHG | HEIGHT: 72 IN | WEIGHT: 248.4 LBS

## 2019-12-31 DIAGNOSIS — Z01.810 PREOP CARDIOVASCULAR EXAM: Primary | ICD-10-CM

## 2019-12-31 DIAGNOSIS — R94.31 ABNORMAL EKG: ICD-10-CM

## 2019-12-31 PROCEDURE — 99214 OFFICE O/P EST MOD 30 MIN: CPT | Performed by: INTERNAL MEDICINE

## 2019-12-31 NOTE — ASSESSMENT & PLAN NOTE
Abnormal EKG with a pattern of incomplete right bundle-branch block and nonspecific ST and T-wave changes    As mentioned above will arrange for the patient to have an echocardiogram and nuclear stress test

## 2019-12-31 NOTE — PROGRESS NOTES
The patient had nuclear stress test showing no evidence of ischemia  The left ventricular ejection fraction was normal   The patient is considered to be stable from the cardiac standpoint to proceed with surgery  Assessment/Plan:    Preop cardiovascular exam  The patient is scheduled for ear surgery  Patient has an abnormal EKG  I will arrange for the patient to have an echocardiogram and a nuclear stress test     Abnormal EKG  Abnormal EKG with a pattern of incomplete right bundle-branch block and nonspecific ST and T-wave changes  As mentioned above will arrange for the patient to have an echocardiogram and nuclear stress test       Diagnoses and all orders for this visit:    Preop cardiovascular exam    Abnormal EKG  -     Echo complete with contrast if indicated; Future  -     NM myocardial perfusion spect (rx stress and/or rest); Future          Subjective:  Feels well from the cardiac standpoint  Patient ID: Gaston Lane is a 69 y o  male  The patient presented to this office for the purpose of cardiac evaluation and consultation in anticipation of right ear surgery  Patient was noted on pre-admission testing to have an abnormal EKG with a pattern of incomplete right bundle-branch block as well as nonspecific ST and T-wave changes  Patient denies any symptoms of chest pain, shortness of breath, palpitation, dizziness or lightheadedness  Has no history of leg edema but has history of varicose veins  The patient had bilateral knee replacement surgery or however a year ago  Patient denies any history of hypertension or diabetes mellitus  He was told that he had mildly elevated cholesterol level  He is not taking statin at this time  The patient used to be a smoker and quit 23 years ago  He has an occasional alcohol  Family history is significant for absence of early heart disease        The following portions of the patient's history were reviewed and updated as appropriate: allergies, current medications, past family history, past medical history, past social history, past surgical history and problem list     Review of Systems   Respiratory: Negative for apnea, cough, chest tightness, shortness of breath and wheezing  Cardiovascular: Negative for chest pain, palpitations and leg swelling  Gastrointestinal: Negative for abdominal pain  Neurological: Negative for dizziness and light-headedness  Hematological: Negative  Psychiatric/Behavioral: Negative  Objective:  Mild elevation of blood pressure initially but this improved as the patient rested  /85 (BP Location: Right arm, Patient Position: Sitting) Comment: After resting  Pulse 80   Resp 18   Ht 6' (1 829 m)   Wt 113 kg (248 lb 6 4 oz)   SpO2 93%   BMI 33 69 kg/m²          Physical Exam   Constitutional: He is oriented to person, place, and time  He appears well-developed and well-nourished  No distress  HENT:   Head: Normocephalic  Eyes: Pupils are equal, round, and reactive to light  Neck: Normal range of motion  No JVD present  No thyromegaly present  Cardiovascular: Normal rate, regular rhythm, S1 normal and S2 normal  Exam reveals no gallop and no friction rub  Murmur heard  Crescendo systolic murmur is present with a grade of 1/6  Pulmonary/Chest: Effort normal and breath sounds normal  No respiratory distress  He has no wheezes  He has no rales  He exhibits no tenderness  Abdominal: Soft  Musculoskeletal: Normal range of motion  He exhibits no edema, tenderness or deformity  Neurological: He is alert and oriented to person, place, and time  Skin: Skin is warm and dry  He is not diaphoretic  Psychiatric: He has a normal mood and affect  Vitals reviewed

## 2019-12-31 NOTE — LETTER
December 31, 2019     Referral [facility]    Patient: Tomas Mckenzie   YOB: 1945   Date of Visit: 12/31/2019       Dear Dr Julianna He:    Thank you for referring Chel Carissa to me for evaluation  Below are my notes for this consultation  If you have questions, please do not hesitate to call me  I look forward to following your patient along with you  Sincerely,        Noemy Barajas MD        CC: MD Jackie Julien, DO Noemy Barajas MD  12/31/2019 10:20 AM  Sign at close encounter  Assessment/Plan:    Preop cardiovascular exam  The patient is scheduled for ear surgery  Patient has an abnormal EKG  I will arrange for the patient to have an echocardiogram and a nuclear stress test     Abnormal EKG  Abnormal EKG with a pattern of incomplete right bundle-branch block and nonspecific ST and T-wave changes  As mentioned above will arrange for the patient to have an echocardiogram and nuclear stress test       Diagnoses and all orders for this visit:    Preop cardiovascular exam    Abnormal EKG  -     Echo complete with contrast if indicated; Future  -     NM myocardial perfusion spect (rx stress and/or rest); Future          Subjective:  Feels well from the cardiac standpoint  Patient ID: Tomas Mckenzie is a 69 y o  male  The patient presented to this office for the purpose of cardiac evaluation and consultation in anticipation of right ear surgery  Patient was noted on pre-admission testing to have an abnormal EKG with a pattern of incomplete right bundle-branch block as well as nonspecific ST and T-wave changes  Patient denies any symptoms of chest pain, shortness of breath, palpitation, dizziness or lightheadedness  Has no history of leg edema but has history of varicose veins  The patient had bilateral knee replacement surgery or however a year ago  Patient denies any history of hypertension or diabetes mellitus    He was told that he had mildly elevated cholesterol level  He is not taking statin at this time  The patient used to be a smoker and quit 23 years ago  He has an occasional alcohol  Family history is significant for absence of early heart disease  The following portions of the patient's history were reviewed and updated as appropriate: allergies, current medications, past family history, past medical history, past social history, past surgical history and problem list     Review of Systems   Respiratory: Negative for apnea, cough, chest tightness, shortness of breath and wheezing  Cardiovascular: Negative for chest pain, palpitations and leg swelling  Gastrointestinal: Negative for abdominal pain  Neurological: Negative for dizziness and light-headedness  Hematological: Negative  Psychiatric/Behavioral: Negative  Objective:  Mild elevation of blood pressure initially but this improved as the patient rested  /85 (BP Location: Right arm, Patient Position: Sitting) Comment: After resting  Pulse 80   Resp 18   Ht 6' (1 829 m)   Wt 113 kg (248 lb 6 4 oz)   SpO2 93%   BMI 33 69 kg/m²           Physical Exam   Constitutional: He is oriented to person, place, and time  He appears well-developed and well-nourished  No distress  HENT:   Head: Normocephalic  Eyes: Pupils are equal, round, and reactive to light  Neck: Normal range of motion  No JVD present  No thyromegaly present  Cardiovascular: Normal rate, regular rhythm, S1 normal and S2 normal  Exam reveals no gallop and no friction rub  Murmur heard  Crescendo systolic murmur is present with a grade of 1/6  Pulmonary/Chest: Effort normal and breath sounds normal  No respiratory distress  He has no wheezes  He has no rales  He exhibits no tenderness  Abdominal: Soft  Musculoskeletal: Normal range of motion  He exhibits no edema, tenderness or deformity  Neurological: He is alert and oriented to person, place, and time  Skin: Skin is warm and dry  He is not diaphoretic  Psychiatric: He has a normal mood and affect  Vitals reviewed

## 2019-12-31 NOTE — ASSESSMENT & PLAN NOTE
The patient is scheduled for ear surgery  Patient has an abnormal EKG    I will arrange for the patient to have an echocardiogram and a nuclear stress test

## 2019-12-31 NOTE — PATIENT INSTRUCTIONS
The patient will be scheduled for echocardiogram and nuclear stress test in anticipation of his planned surgery

## 2020-01-08 ENCOUNTER — HOSPITAL ENCOUNTER (OUTPATIENT)
Dept: NON INVASIVE DIAGNOSTICS | Facility: CLINIC | Age: 75
Discharge: HOME | End: 2020-01-08
Payer: MEDICARE

## 2020-01-08 DIAGNOSIS — R94.31 ABNORMAL EKG: ICD-10-CM

## 2020-01-08 LAB
ARRHY DURING EX: NORMAL
CHEST PAIN STATEMENT: NORMAL
MAX DIASTOLIC BP: 100 MMHG
MAX HEART RATE: 141 BPM
MAX PREDICTED HEART RATE: 147 BPM
MAX. SYSTOLIC BP: 200 MMHG
PROTOCOL NAME: NORMAL
TARGET HR FORMULA: NORMAL
TEST INDICATION: NORMAL
TIME IN EXERCISE PHASE: NORMAL

## 2020-01-08 PROCEDURE — 78452 HT MUSCLE IMAGE SPECT MULT: CPT

## 2020-01-08 PROCEDURE — 93018 CV STRESS TEST I&R ONLY: CPT | Performed by: INTERNAL MEDICINE

## 2020-01-08 PROCEDURE — A9502 TC99M TETROFOSMIN: HCPCS

## 2020-01-08 PROCEDURE — 93016 CV STRESS TEST SUPVJ ONLY: CPT | Performed by: INTERNAL MEDICINE

## 2020-01-08 PROCEDURE — 93017 CV STRESS TEST TRACING ONLY: CPT

## 2020-01-08 PROCEDURE — 78452 HT MUSCLE IMAGE SPECT MULT: CPT | Performed by: INTERNAL MEDICINE

## 2020-01-09 ENCOUNTER — HOSPITAL ENCOUNTER (OUTPATIENT)
Dept: NON INVASIVE DIAGNOSTICS | Facility: HOSPITAL | Age: 75
Discharge: HOME | End: 2020-01-09
Payer: MEDICARE

## 2020-01-09 DIAGNOSIS — R94.31 ABNORMAL EKG: ICD-10-CM

## 2020-01-09 PROCEDURE — 93306 TTE W/DOPPLER COMPLETE: CPT | Performed by: INTERNAL MEDICINE

## 2020-01-09 PROCEDURE — 93306 TTE W/DOPPLER COMPLETE: CPT

## 2020-01-20 ENCOUNTER — TELEPHONE (OUTPATIENT)
Dept: CARDIOLOGY CLINIC | Facility: CLINIC | Age: 75
End: 2020-01-20

## 2020-01-20 NOTE — TELEPHONE ENCOUNTER
Cleared for surgery  Please see addendum to the note December 31st   Also please forward a copy of this to the surgeon involved

## 2020-03-11 NOTE — PROGRESS NOTES
Progress Note - Kevyn Marti 1945, 68 y o  male MRN: [id number]    Unit/Bed#: -01 Encounter: [id number]    Primary Care Provider: Charlyne Brittle   Date and time admitted to hospital: 9/7/2018  1:55 PM        * Primary osteoarthritis of both knees   Assessment & Plan    S/P B/L TKA WBAT with pain    PT/OT  Arixtra DVT prophy  Pepcid GI prophy  OxyIR and Lidoderm  Robaxin for spasms  Keflex completed        Acute blood loss as cause of postoperative anemia   Assessment & Plan    Post op anemia    FeSO4  Monitor CBC        Gastroesophageal reflux disease without esophagitis   Assessment & Plan    GERD    Pepcid          Vitals:  Temp:  [96 1 °F (35 6 °C)-96 9 °F (36 1 °C)] 96 9 °F (36 1 °C)  HR:  [94] 94  Resp:  [16-18] 18  BP: (130-134)/(70-78) 130/70    Physical Exam:  General: alert, no apparent distress, cooperative and comfortable  BM today  HENMT: Head: Normal, normocephalic, atraumatic  Eye: Normal external eye, conjunctiva, lids   Ears: Normal external ears  Nose: Normal external nose, mucus membranes  COR: [id number]  Pulmonary: chest expansion normal, no retractions, no accessory muscle usage, no wheezes, rales, or rhonchi   Abdomen: soft, nontender, nondistended, no masses or organomegaly  Skin/Extremity: b/l knee incisions C/D/Staples intact, Mod edema, resolving ecchymosis  Calves soft, NT  Neurologic: Awake alert orientedx3  Psych: normal mood, behavior, speech, dress, and thought processes  Musculoskeletal:limited rom b/l knees  Patient is progressing with ADLs and functional mobility  Amb 16 ft, RW, Supervision      Current Facility-Administered Medications   Medication Dose Route Frequency Provider Last Rate Last Dose    acetaminophen (TYLENOL) tablet 650 mg  650 mg Oral Q6H PRN Danuta Barker MD   650 mg at 09/12/18 0743    aluminum-magnesium hydroxide-simethicone (MYLANTA) 200-200-20 mg/5 mL oral suspension 30 mL  30 mL Oral Q6H PRN Danuta Barker MD   30 mL at 09/13/18 0729    BIOTENE MOISTURIZING MOUTH SOLN 1 spray  1 spray Buccal 4x Daily PRN Yocasta Pemberton MD   1 spray at 09/12/18 0538    bisacodyl (DULCOLAX) rectal suppository 10 mg  10 mg Rectal Daily PRN Yocasta Pemberton MD        [employer] LEG CRAMPS PAIN RELIEF TABS 3 tablet  3 tablet Sublingual Q4H PRN Yocasta Pemberton MD        docusate sodium (COLACE) capsule 100 mg  100 mg Oral BID Yocasta Pemberton MD   100 mg at 09/13/18 0813    famotidine (PEPCID) tablet 20 mg  20 mg Oral Daily Yocasta Pemberton MD   20 mg at 09/13/18 0813    ferrous sulfate tablet 325 mg  325 mg Oral BID With Meals Yocasta Pemberton MD   325 mg at 09/13/18 0813    fondaparinux (ARIXTRA) subcutaneous injection 2 5 mg  2 5 mg Subcutaneous Daily Yocasta Pemberton MD   2 5 mg at 09/13/18 0813    lidocaine (LIDODERM) 5 % patch 2 patch  2 patch Transdermal Daily Yocasta Pemberton MD   2 patch at 09/13/18 0813    methocarbamol (ROBAXIN) tablet 500 mg  500 mg Oral Q6H PRN Yocasta Pemberton MD   500 mg at 09/11/18 1009    multivitamin-minerals (CENTRUM) tablet 1 tablet  1 tablet Oral Daily Yocasta Pemberton MD   1 tablet at 09/13/18 0813    oxyCODONE (ROXICODONE) IR tablet 10 mg  10 mg Oral Q4H PRN Yocasta Pemberton MD   10 mg at 09/10/18 0955    oxyCODONE (ROXICODONE) IR tablet 15 mg  15 mg Oral Q4H PRN Yocasta Pemberton MD   15 mg at 09/13/18 0616    polyethylene glycol (MIRALAX) packet 17 g  17 g Oral Daily Yocasta Pemberton MD   17 g at 09/13/18 0815    senna (SENOKOT) tablet 8 6 mg  1 tablet Oral HS Yocasta Pemberton MD   8 6 mg at 09/12/18 9080        Laboratory:    Lab Results   Component Value Date    HGB 10 3 (L) 09/10/2018    HCT 30 1 (L) 09/10/2018    WBC 10 40 09/08/2018     Lab Results   Component Value Date    BUN 14 09/08/2018     09/08/2018    K 3 7 09/08/2018     09/08/2018    CREATININE 0 66 (L) 09/08/2018     Lab Results   Component Value Date    PROTIME 11 6 08/16/2018    INR 1 00 08/16/2018 no concerns

## 2020-03-13 ENCOUNTER — APPOINTMENT (OUTPATIENT)
Dept: LAB | Facility: CLINIC | Age: 75
End: 2020-03-13
Payer: MEDICARE

## 2020-03-13 ENCOUNTER — TRANSCRIBE ORDERS (OUTPATIENT)
Dept: LAB | Facility: CLINIC | Age: 75
End: 2020-03-13

## 2020-03-13 DIAGNOSIS — Z01.812 PRE-OPERATIVE LABORATORY EXAMINATION: ICD-10-CM

## 2020-03-13 DIAGNOSIS — Z01.812 PRE-OPERATIVE LABORATORY EXAMINATION: Primary | ICD-10-CM

## 2020-03-13 LAB
BUN SERPL-MCNC: 14 MG/DL (ref 5–25)
GLUCOSE SERPL-MCNC: 99 MG/DL (ref 65–140)
HCT VFR BLD AUTO: 48.1 % (ref 36.5–49.3)
HGB BLD-MCNC: 15.4 G/DL (ref 12–17)

## 2020-03-13 PROCEDURE — 85014 HEMATOCRIT: CPT

## 2020-03-13 PROCEDURE — 36415 COLL VENOUS BLD VENIPUNCTURE: CPT

## 2020-03-13 PROCEDURE — 84520 ASSAY OF UREA NITROGEN: CPT

## 2020-03-13 PROCEDURE — 82947 ASSAY GLUCOSE BLOOD QUANT: CPT

## 2020-03-13 PROCEDURE — 85018 HEMOGLOBIN: CPT

## 2020-04-02 ENCOUNTER — APPOINTMENT (OUTPATIENT)
Dept: RADIOLOGY | Facility: CLINIC | Age: 75
End: 2020-04-02
Payer: MEDICARE

## 2020-04-02 ENCOUNTER — OFFICE VISIT (OUTPATIENT)
Dept: URGENT CARE | Facility: CLINIC | Age: 75
End: 2020-04-02
Payer: MEDICARE

## 2020-04-02 VITALS
DIASTOLIC BLOOD PRESSURE: 92 MMHG | OXYGEN SATURATION: 95 % | TEMPERATURE: 97.3 F | RESPIRATION RATE: 20 BRPM | SYSTOLIC BLOOD PRESSURE: 175 MMHG | WEIGHT: 251 LBS | HEART RATE: 87 BPM | BODY MASS INDEX: 35.93 KG/M2 | HEIGHT: 70 IN

## 2020-04-02 DIAGNOSIS — S61.012A LACERATION OF LEFT THUMB WITH COMPLICATION, INITIAL ENCOUNTER: Primary | ICD-10-CM

## 2020-04-02 DIAGNOSIS — S61.012A LACERATION OF LEFT THUMB WITH COMPLICATION, INITIAL ENCOUNTER: ICD-10-CM

## 2020-04-02 PROCEDURE — 12002 RPR S/N/AX/GEN/TRNK2.6-7.5CM: CPT | Performed by: PHYSICIAN ASSISTANT

## 2020-04-02 PROCEDURE — 90471 IMMUNIZATION ADMIN: CPT | Performed by: PHYSICIAN ASSISTANT

## 2020-04-02 PROCEDURE — 90715 TDAP VACCINE 7 YRS/> IM: CPT

## 2020-04-02 PROCEDURE — 73140 X-RAY EXAM OF FINGER(S): CPT

## 2020-04-02 PROCEDURE — 99204 OFFICE O/P NEW MOD 45 MIN: CPT | Performed by: PHYSICIAN ASSISTANT

## 2020-04-02 PROCEDURE — G0463 HOSPITAL OUTPT CLINIC VISIT: HCPCS | Performed by: PHYSICIAN ASSISTANT

## 2020-04-02 RX ORDER — CEPHALEXIN 500 MG/1
500 CAPSULE ORAL EVERY 6 HOURS SCHEDULED
Qty: 28 CAPSULE | Refills: 0 | Status: SHIPPED | OUTPATIENT
Start: 2020-04-02 | End: 2020-04-09

## 2020-05-19 ENCOUNTER — TRANSCRIBE ORDERS (OUTPATIENT)
Dept: LAB | Facility: HOSPITAL | Age: 75
End: 2020-05-19

## 2020-05-19 ENCOUNTER — OFFICE VISIT (OUTPATIENT)
Dept: LAB | Facility: HOSPITAL | Age: 75
End: 2020-05-19
Payer: MEDICARE

## 2020-05-19 ENCOUNTER — APPOINTMENT (OUTPATIENT)
Dept: LAB | Facility: HOSPITAL | Age: 75
End: 2020-05-19
Payer: MEDICARE

## 2020-05-19 DIAGNOSIS — Z01.812 PRE-OPERATIVE LABORATORY EXAMINATION: ICD-10-CM

## 2020-05-19 DIAGNOSIS — Z01.812 PRE-OPERATIVE LABORATORY EXAMINATION: Primary | ICD-10-CM

## 2020-05-19 LAB
ATRIAL RATE: 69 BPM
BUN SERPL-MCNC: 14 MG/DL (ref 7–25)
GLUCOSE P FAST SERPL-MCNC: 112 MG/DL (ref 65–99)
HCT VFR BLD AUTO: 44.6 % (ref 42–47)
HGB BLD-MCNC: 15.1 G/DL (ref 14–18)
P AXIS: 28 DEGREES
PR INTERVAL: 182 MS
QRS AXIS: -3 DEGREES
QRSD INTERVAL: 144 MS
QT INTERVAL: 410 MS
QTC INTERVAL: 439 MS
T WAVE AXIS: 6 DEGREES
VENTRICULAR RATE: 69 BPM

## 2020-05-19 PROCEDURE — 36415 COLL VENOUS BLD VENIPUNCTURE: CPT

## 2020-05-19 PROCEDURE — 93010 ELECTROCARDIOGRAM REPORT: CPT | Performed by: INTERNAL MEDICINE

## 2020-05-19 PROCEDURE — 84520 ASSAY OF UREA NITROGEN: CPT

## 2020-05-19 PROCEDURE — 85018 HEMOGLOBIN: CPT

## 2020-05-19 PROCEDURE — 85014 HEMATOCRIT: CPT

## 2020-05-19 PROCEDURE — 93005 ELECTROCARDIOGRAM TRACING: CPT

## 2020-05-19 PROCEDURE — 82947 ASSAY GLUCOSE BLOOD QUANT: CPT

## 2020-06-27 ENCOUNTER — APPOINTMENT (OUTPATIENT)
Dept: LAB | Facility: CLINIC | Age: 75
End: 2020-06-27
Payer: MEDICARE

## 2020-06-27 ENCOUNTER — TRANSCRIBE ORDERS (OUTPATIENT)
Dept: URGENT CARE | Facility: CLINIC | Age: 75
End: 2020-06-27

## 2020-06-27 DIAGNOSIS — E78.5 HYPERLIPIDEMIA, UNSPECIFIED HYPERLIPIDEMIA TYPE: ICD-10-CM

## 2020-06-27 DIAGNOSIS — Z12.5 SPECIAL SCREENING FOR MALIGNANT NEOPLASM OF PROSTATE: ICD-10-CM

## 2020-06-27 DIAGNOSIS — E78.5 HYPERLIPIDEMIA, UNSPECIFIED HYPERLIPIDEMIA TYPE: Primary | ICD-10-CM

## 2020-06-27 LAB
ALBUMIN SERPL BCP-MCNC: 4 G/DL (ref 3.5–5)
ALP SERPL-CCNC: 89 U/L (ref 46–116)
ALT SERPL W P-5'-P-CCNC: 31 U/L (ref 12–78)
ANION GAP SERPL CALCULATED.3IONS-SCNC: 4 MMOL/L (ref 4–13)
AST SERPL W P-5'-P-CCNC: 14 U/L (ref 5–45)
BILIRUB SERPL-MCNC: 0.63 MG/DL (ref 0.2–1)
BUN SERPL-MCNC: 16 MG/DL (ref 5–25)
CALCIUM SERPL-MCNC: 9 MG/DL (ref 8.3–10.1)
CHLORIDE SERPL-SCNC: 105 MMOL/L (ref 100–108)
CHOLEST SERPL-MCNC: 181 MG/DL (ref 50–200)
CO2 SERPL-SCNC: 28 MMOL/L (ref 21–32)
CREAT SERPL-MCNC: 0.9 MG/DL (ref 0.6–1.3)
GFR SERPL CREATININE-BSD FRML MDRD: 84 ML/MIN/1.73SQ M
GLUCOSE P FAST SERPL-MCNC: 102 MG/DL (ref 65–99)
HDLC SERPL-MCNC: 42 MG/DL
LDLC SERPL CALC-MCNC: 116 MG/DL (ref 0–100)
NONHDLC SERPL-MCNC: 139 MG/DL
POTASSIUM SERPL-SCNC: 4.7 MMOL/L (ref 3.5–5.3)
PROT SERPL-MCNC: 7.4 G/DL (ref 6.4–8.2)
PSA SERPL-MCNC: 2 NG/ML (ref 0–4)
SODIUM SERPL-SCNC: 137 MMOL/L (ref 136–145)
TRIGL SERPL-MCNC: 117 MG/DL

## 2020-06-27 PROCEDURE — G0103 PSA SCREENING: HCPCS

## 2020-06-27 PROCEDURE — 80053 COMPREHEN METABOLIC PANEL: CPT

## 2020-06-27 PROCEDURE — 80061 LIPID PANEL: CPT

## 2020-06-27 PROCEDURE — 36415 COLL VENOUS BLD VENIPUNCTURE: CPT

## 2020-08-03 ENCOUNTER — HOSPITAL ENCOUNTER (EMERGENCY)
Facility: HOSPITAL | Age: 75
Discharge: HOME | End: 2020-08-03
Attending: EMERGENCY MEDICINE | Admitting: EMERGENCY MEDICINE
Payer: MEDICARE

## 2020-08-03 ENCOUNTER — OFFICE VISIT (OUTPATIENT)
Dept: URGENT CARE | Facility: CLINIC | Age: 75
End: 2020-08-03
Payer: MEDICARE

## 2020-08-03 ENCOUNTER — APPOINTMENT (OUTPATIENT)
Dept: RADIOLOGY | Facility: CLINIC | Age: 75
End: 2020-08-03
Payer: MEDICARE

## 2020-08-03 VITALS
RESPIRATION RATE: 18 BRPM | WEIGHT: 251.99 LBS | SYSTOLIC BLOOD PRESSURE: 157 MMHG | HEART RATE: 84 BPM | TEMPERATURE: 98.6 F | DIASTOLIC BLOOD PRESSURE: 90 MMHG | BODY MASS INDEX: 36.16 KG/M2 | OXYGEN SATURATION: 96 %

## 2020-08-03 VITALS
SYSTOLIC BLOOD PRESSURE: 163 MMHG | HEIGHT: 70 IN | OXYGEN SATURATION: 95 % | TEMPERATURE: 98.5 F | RESPIRATION RATE: 18 BRPM | BODY MASS INDEX: 35.79 KG/M2 | WEIGHT: 250 LBS | HEART RATE: 104 BPM | DIASTOLIC BLOOD PRESSURE: 94 MMHG

## 2020-08-03 DIAGNOSIS — S61.215A LACERATION OF LEFT RING FINGER WITHOUT FOREIGN BODY WITHOUT DAMAGE TO NAIL, INITIAL ENCOUNTER: ICD-10-CM

## 2020-08-03 DIAGNOSIS — S61.213A LACERATION OF LEFT MIDDLE FINGER WITHOUT FOREIGN BODY WITHOUT DAMAGE TO NAIL, INITIAL ENCOUNTER: Primary | ICD-10-CM

## 2020-08-03 DIAGNOSIS — S61.211A LACERATION OF LEFT INDEX FINGER WITHOUT FOREIGN BODY WITHOUT DAMAGE TO NAIL, INITIAL ENCOUNTER: ICD-10-CM

## 2020-08-03 DIAGNOSIS — S61.412A LACERATION OF LEFT HAND, FOREIGN BODY PRESENCE UNSPECIFIED, INITIAL ENCOUNTER: Primary | ICD-10-CM

## 2020-08-03 DIAGNOSIS — S61.412A LACERATION OF LEFT HAND, FOREIGN BODY PRESENCE UNSPECIFIED, INITIAL ENCOUNTER: ICD-10-CM

## 2020-08-03 DIAGNOSIS — S62.653B OPEN NONDISPLACED FRACTURE OF MIDDLE PHALANX OF LEFT MIDDLE FINGER, INITIAL ENCOUNTER: ICD-10-CM

## 2020-08-03 PROCEDURE — 12044 INTMD RPR N-HF/GENIT7.6-12.5: CPT | Performed by: EMERGENCY MEDICINE

## 2020-08-03 PROCEDURE — G0463 HOSPITAL OUTPT CLINIC VISIT: HCPCS | Performed by: NURSE PRACTITIONER

## 2020-08-03 PROCEDURE — 99213 OFFICE O/P EST LOW 20 MIN: CPT | Performed by: NURSE PRACTITIONER

## 2020-08-03 PROCEDURE — 73130 X-RAY EXAM OF HAND: CPT

## 2020-08-03 PROCEDURE — 99284 EMERGENCY DEPT VISIT MOD MDM: CPT | Performed by: EMERGENCY MEDICINE

## 2020-08-03 PROCEDURE — 99283 EMERGENCY DEPT VISIT LOW MDM: CPT

## 2020-08-03 RX ORDER — LIDOCAINE HYDROCHLORIDE 10 MG/ML
10 INJECTION, SOLUTION EPIDURAL; INFILTRATION; INTRACAUDAL; PERINEURAL ONCE
Status: COMPLETED | OUTPATIENT
Start: 2020-08-03 | End: 2020-08-03

## 2020-08-03 RX ORDER — AMOXICILLIN AND CLAVULANATE POTASSIUM 875; 125 MG/1; MG/1
1 TABLET, FILM COATED ORAL ONCE
Status: COMPLETED | OUTPATIENT
Start: 2020-08-03 | End: 2020-08-03

## 2020-08-03 RX ORDER — NAPROXEN 500 MG/1
500 TABLET ORAL 2 TIMES DAILY WITH MEALS
Qty: 30 TABLET | Refills: 0 | Status: SHIPPED | OUTPATIENT
Start: 2020-08-03

## 2020-08-03 RX ORDER — OXYCODONE HYDROCHLORIDE AND ACETAMINOPHEN 5; 325 MG/1; MG/1
1 TABLET ORAL EVERY 4 HOURS PRN
Qty: 10 TABLET | Refills: 0 | Status: SHIPPED | OUTPATIENT
Start: 2020-08-03 | End: 2020-08-06

## 2020-08-03 RX ORDER — AMOXICILLIN AND CLAVULANATE POTASSIUM 875; 125 MG/1; MG/1
1 TABLET, FILM COATED ORAL EVERY 12 HOURS
Qty: 20 TABLET | Refills: 0 | Status: SHIPPED | OUTPATIENT
Start: 2020-08-03 | End: 2020-08-13

## 2020-08-03 RX ADMIN — AMOXICILLIN AND CLAVULANATE POTASSIUM 1 TABLET: 875; 125 TABLET, FILM COATED ORAL at 20:57

## 2020-08-03 RX ADMIN — LIDOCAINE HYDROCHLORIDE 10 ML: 10 INJECTION, SOLUTION EPIDURAL; INFILTRATION; INTRACAUDAL; PERINEURAL at 18:23

## 2020-08-03 NOTE — PROGRESS NOTES
[facility]        NAME: Maria C Barrios is a 77 y o  male  : 1945    MRN: [id number]  DATE: August 3, 2020  TIME: 4:28 PM    Assessment and Plan   Laceration of left hand, foreign body presence unspecified, initial encounter [S61 412A]  1  Laceration of left hand, foreign body presence unspecified, initial encounter  XR hand 3+ vw left    Transfer to other facility     There is a deep laceration over the left middle finger there is also fracture noted  At this time I think patient should go to the ER for further evaluation and and closure of this wound  At this time recommended patient go down to [facility] to the trauma center for evaluation  He was agreeable  W[facility] did return call and [facility]  Order placed in [facility] for them to go to [facility]  Patient Instructions     Patient Instructions   As we discussed, I would recommend going to ER as there is open fracture of the left 3 middle phlanx, wound is also deep  You were agreeable  Order placed in [address] [place] [address]  ER notified of his arrival        Chief Complaint     Chief Complaint   Patient presents with    Laceration     hand lacertaion from hedge trimmers         History of Present Illness   Maria C Barrios presents to the clinic c/o    This is a 81-year-old male here today with complaints of left hand injury  He states he was using head tremor today on a ladder when he went to the [facility] the 3rd deep lacerations  He does have full range of motion is finger  No numbness or tingling  Bleeding controlled with pressure  Review of Systems   Review of Systems   Constitutional: Negative  Respiratory: Negative  Cardiovascular: Negative  Skin: Positive for wound  Neurological: Negative  Psychiatric/Behavioral: Negative            Current Medications     Long-Term Medications   Medication Sig Dispense Refill    Multiple Vitamins-Minerals (MULTIVITAMIN WITH MINERALS) tablet Take 1 tablet by mouth daily (Patient not taking: Reported on 4/2/2020)  0       Current Allergies     Allergies as of 08/03/2020 - Reviewed 08/03/2020   Allergen Reaction Noted    Keflex [cephalexin] Hives 08/03/2020            The following portions of the patient's history were reviewed and updated as appropriate: allergies, current medications, past family history, past medical history, past social history, past surgical history and problem list     Objective   /94   Pulse 104   Temp 98 5 °F (36 9 °C) (Temporal)   Resp 18   Ht 5' 10"   Wt 113 kg (250 lb)   SpO2 95%   BMI 35 87 kg/m²        Physical Exam     Physical Exam   Pulmonary/Chest: Effort normal    Musculoskeletal:      Comments: Left hand there is a deep laceration over the middle of the left middle finger  Left pointer finger there is laceration over the distal aspect of the finger  Left 4th finger there is laceration over the middle    He does have good resistant testing  Neurological: He is alert  Skin: Skin is warm and dry  Psychiatric: His behavior is normal  Mood, judgment and thought content normal    Nursing note and vitals reviewed  Wound soaked for 30 minutes  X-ray does show to open fractures  Dressing applied to site prior to leaving  Left hand xray report:   3rd mid phalanx fracture and soft tissue injury  2nd distal phalanx small nondisplaced cortical fracture and soft tissue injury

## 2020-08-03 NOTE — ED PROVIDER NOTES
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 Taking?    Multiple Vitamins-Minerals (MULTIVITAMIN WITH MINERALS) tablet   No No   Sig: Take 1 tablet by mouth daily   Patient not taking: Reported on 4/2/2020      Facility-Administered Medications: None       Past Medical History:   Diagnosis Date    Arthritis     Deafness in right ear     Elevated PSA, between 10 and less than 20 ng/ml 03/20/2018    TURP today 3/30/3028    GERD (gastroesophageal reflux disease)     Hearing aid worn     Left ear    History of URI (upper respiratory infection)     States two months ago and was on an antibiotic and was given an inhaler    [place] (hard of hearing)     Left ear    Hyperlipidemia     Knee pain, bilateral     Arthritis    Obese     Wears glasses        Past Surgical History:   Procedure Laterality Date    COLONOSCOPY      HYDROCELE EXCISION / REPAIR      Age 17    KNEE SURGERY Bilateral     Meniscus repair, bilateral     AK TOTAL KNEE ARTHROPLASTY Bilateral 9/5/2018 Procedure: KNEE TOTAL ARTHROPLASTY;  Surgeon: Yoandy Edwards DO;  Location: [address] [place] [address] MAIN OR;  Service: Orthopedics    AK TRANSURETHRAL ELEC-SURG PROSTATECTOM N/A 3/30/2018    Procedure: TRANSURETHRAL RESECTION OF PROSTATE (TURP); Surgeon: Michelle Mann MD;  Location: [facility];  Service: Urology    TONSILLECTOMY         Family History   Problem Relation Age of Onset    Aneurysm Mother     Asthma Father      I have reviewed and agree with the history as documented  E-Cigarette/Vaping     E-Cigarette/Vaping Substances     Social History     Tobacco Use    Smoking status: Former Smoker     Packs/day: 2 00     Years: 34 00     Pack years: 68 00     Last attempt to quit:      Years since quittin 6    Smokeless tobacco: Never Used   Substance Use Topics    Alcohol use: Yes     Comment: 2 monthly    Drug use: No       Review of Systems   Constitutional: Negative for chills, fatigue and fever  HENT: Negative for rhinorrhea, sore throat and trouble swallowing  Eyes: Negative for photophobia and visual disturbance  Respiratory: Negative for cough, chest tightness and shortness of breath  Cardiovascular: Negative for chest pain, palpitations and leg swelling  Gastrointestinal: Negative for abdominal pain, blood in stool, diarrhea, nausea and vomiting  Endocrine: Negative for polyuria  Genitourinary: Negative for dysuria, flank pain and hematuria  Musculoskeletal: Negative for back pain and neck pain  Skin: Positive for wound  Negative for color change and rash  Allergic/Immunologic: Negative for immunocompromised state  Neurological: Negative for dizziness, weakness, light-headedness, numbness and headaches  All other systems reviewed and are negative  Physical Exam  Physical Exam  Constitutional:       General: He is not in acute distress  Appearance: Normal appearance  He is well-developed  HENT:      Mouth/Throat:      Pharynx: Uvula midline     Eyes:      General: Lids are normal       Conjunctiva/sclera: Conjunctivae normal       Pupils: Pupils are equal, round, and reactive to light  Neck:      Thyroid: No thyroid mass or thyromegaly  Trachea: Trachea normal    Cardiovascular:      Rate and Rhythm: Normal rate and regular rhythm  Pulses: Normal pulses  Heart sounds: Normal heart sounds  No murmur  Pulmonary:      Effort: Pulmonary effort is normal       Breath sounds: Normal breath sounds  Abdominal:      General: Bowel sounds are normal       Palpations: Abdomen is soft  Tenderness: There is no abdominal tenderness  There is no guarding or rebound  Negative signs include Welch's sign  Musculoskeletal:      Comments: Large lacerations to left index, long, ring finger  Bleeding controlled  Patient able to flex and extend at all joints of fingers in left hand  Sensation in the distal phalanx of all fingers intact  Skin:     General: Skin is warm and dry  Neurological:      Mental Status: He is alert  Psychiatric:         Speech: Speech normal          Behavior: Behavior normal  Behavior is cooperative  Thought Content:  Thought content normal                    Vital Signs  ED Triage Vitals [08/03/20 1723]   Temperature Pulse Respirations Blood Pressure SpO2   98 6 °F (37 °C) (!) 108 18 (!) 184/110 96 %      Temp Source Heart Rate Source Patient Position - Orthostatic VS BP Location FiO2 (%)   Temporal Monitor Sitting Right arm --      Pain Score       No Pain           Vitals:    08/03/20 1723 08/03/20 1900 08/03/20 2000 08/03/20 2030   BP: (!) 184/110 (!) 173/107 163/82 157/90   Pulse: (!) 108 98 84 84   Patient Position - Orthostatic VS: Sitting Lying Sitting Sitting         Visual Acuity      ED Medications  Medications   lidocaine (PF) (XYLOCAINE-MPF) 1 % injection 10 mL (10 mL Infiltration Given 8/3/20 1823)   lidocaine (PF) (XYLOCAINE-MPF) 1 % injection 10 mL (10 mL Infiltration Given 8/3/20 1823)   amoxicillin-clavulanate (AUGMENTIN) 875-125 mg per tablet 1 tablet (1 tablet Oral Given 8/3/20 2057)       Diagnostic Studies  Results Reviewed     None                 No orders to display              Procedures  Laceration repair    Date/Time: 8/3/2020 8:42 PM  Performed by: Paige Beck MD  Authorized by: Paige Beck MD   Consent: Verbal consent obtained  Risks and benefits: risks, benefits and alternatives were discussed  Consent given by: patient  Patient understanding: patient states understanding of the procedure being performed  Patient identity confirmed: verbally with patient  Time out: Immediately prior to procedure a "time out" was called to verify the correct patient, procedure, equipment, support staff and site/side marked as required  Body area: upper extremity  Location details: left index finger  Laceration length: 2 cm  Tendon involvement: none  Nerve involvement: none  Vascular damage: no  Anesthesia: digital block    Anesthesia:  Local Anesthetic: lidocaine 1% without epinephrine    Sedation:  Patient sedated: no        Procedure Details:  Preparation: Patient was prepped and draped in the usual sterile fashion  Irrigation solution: saline  Irrigation method: syringe  Amount of cleaning: extensive  Debridement: none  Degree of undermining: none  Wound skin closure material used: 5-0 chromic  Number of sutures: 10  Technique: simple  Approximation: close  Approximation difficulty: simple  Dressing: clam shell splint  Patient tolerance: Patient tolerated the procedure well with no immediate complications    Laceration repair    Date/Time: 8/3/2020 8:44 PM  Performed by: Paige Beck MD  Authorized by: Paige Beck MD   Consent: Verbal consent obtained    Risks and benefits: risks, benefits and alternatives were discussed  Consent given by: patient  Patient understanding: patient states understanding of the procedure being performed  Time out: Immediately prior to procedure a "time out" was called to verify the correct patient, procedure, equipment, support staff and site/side marked as required  Body area: upper extremity  Location details: left long finger  Laceration length: 6 cm  Tendon involvement: none  Nerve involvement: none  Vascular damage: no  Anesthesia: digital block    Anesthesia:  Local Anesthetic: lidocaine 1% without epinephrine    Sedation:  Patient sedated: no        Procedure Details:  Preparation: Patient was prepped and draped in the usual sterile fashion  Irrigation solution: saline  Irrigation method: syringe  Amount of cleaning: extensive  Debridement: none  Degree of undermining: none  Wound skin closure material used: 5-0 chromic  Number of sutures: 16  Technique: simple  Approximation: close  Approximation difficulty: simple  Dressing: clam shell splint  Patient tolerance: Patient tolerated the procedure well with no immediate complications    Laceration repair    Date/Time: 8/3/2020 8:45 PM  Performed by: Ramon Rendon MD  Authorized by: Ramon Rendon MD   Consent: Verbal consent obtained  Risks and benefits: risks, benefits and alternatives were discussed  Consent given by: patient  Patient understanding: patient states understanding of the procedure being performed  Patient identity confirmed: verbally with patient  Time out: Immediately prior to procedure a "time out" was called to verify the correct patient, procedure, equipment, support staff and site/side marked as required  Body area: upper extremity  Location details: left ring finger  Laceration length: 2 cm  Tendon involvement: none  Nerve involvement: none  Vascular damage: no  Anesthesia: digital block    Anesthesia:  Local Anesthetic: lidocaine 1% without epinephrine    Sedation:  Patient sedated: no        Procedure Details:  Preparation: Patient was prepped and draped in the usual sterile fashion    Irrigation solution: saline  Irrigation method: syringe  Amount of cleaning: extensive  Debridement: none  Degree of undermining: none  Wound skin closure material used: 5-0 chromic  Number of sutures: 7  Technique: simple  Approximation: close  Approximation difficulty: simple  Dressing: clam shell splint  Patient tolerance: Patient tolerated the procedure well with no immediate complications    Splint application    Date/Time: 8/3/2020 8:46 PM  Performed by: Isaiah Yost MD  Authorized by: Isaiah Yost MD     Patient location:  Bedside  Performing Provider:  RN  Other Assisting Provider: No    Consent:     Consent obtained:  Verbal    Consent given by:  Patient    Risks discussed:  Discoloration, numbness, swelling and pain    Alternatives discussed:  No treatment  Universal protocol:     Procedure explained and questions answered to patient or proxy's satisfaction: yes      Patient identity confirmed:  Verbally with patient  Indication:     Indications: fracture      Indications comment:  Suture  Pre-procedure details:     Sensation:  Normal    Skin color:  Pink  Procedure details:     Laterality:  Left    Location:  Hand    Hand:  L hand    Strapping: no      Splint type: clam shell  Supplies:  Ortho-Glass, cotton padding and elastic bandage  Post-procedure details:     Pain:  Improved    Sensation:  Normal    Neurovascular Exam: skin pink, capillary refill <2 sec, normal pulses and skin intact, warm, and dry      Patient tolerance of procedure: Tolerated well, no immediate complications             ED Course  ED Course as of Aug 03 2216   Mon Aug 03, 2020   2041 Patient states that he has taken amoxicillin and penicillin in the past without difficulty or allergic reaction  Patient gets hives with Keflex  US AUDIT      Most Recent Value   Initial Alcohol Screen: US AUDIT-C    1  How often do you have a drink containing alcohol? 2 Filed at: 08/03/2020 1724   2  How many drinks containing alcohol do you have on a typical day you are drinking? 1 Filed at: 08/03/2020 1724   3a   Male UNDER 65: How often do you have five or more drinks on one occasion? 0 Filed at: 08/03/2020 1724   3b  FEMALE Any Age, or MALE 65+: How often do you have 4 or more drinks on one occassion? 0 Filed at: 08/03/2020 1724   Audit-C Score  3 Filed at: 08/03/2020 1724                  SLIM/DAST-10      Most Recent Value   How many times in the past year have you    Used an illegal drug or used a prescription medication for non-medical reasons? Never Filed at: 08/03/2020 1724                                MDM  Number of Diagnoses or Management Options  Diagnosis management comments: X-ray reveals comminuted fracture of the middle phalanx of the left long finger  Will irrigate the wound extensively  Digital blocks  Primary closure  Splint  Follow up with Hand surgery          Disposition  Final diagnoses:   Laceration of left middle finger without foreign body without damage to nail, initial encounter   Laceration of left index finger without foreign body without damage to nail, initial encounter   Laceration of left ring finger without foreign body without damage to nail, initial encounter   Open nondisplaced fracture of middle phalanx of left middle finger, initial encounter     Time reflects when diagnosis was documented in both MDM as applicable and the Disposition within this note     Time User Action Codes Description Comment    8/3/2020  9:20 PM [place] N[place] Add [S61 213A] Laceration of left middle finger without foreign body without damage to nail, initial encounter     8/3/2020  9:20 PM [place] N[place] Add [M78 681A] Laceration of left index finger without foreign body without damage to nail, initial encounter     8/3/2020  9:20 PM [place] Nubia Add [W89 234Z] Laceration of left ring finger without foreign body without damage to nail, initial encounter     8/3/2020  9:21 PM Alexandra Hooker Add [S60 912H] Open nondisplaced fracture of middle phalanx of left middle finger, initial encounter       ED Disposition     ED Disposition Condition Date/Time Comment    Discharge Stable Mon Aug 3, 2020  9:20 PM Fang Moses discharge to home/self care  Follow-up Information     Follow up With Specialties Details Why Contact Info Additional Information    Best Scott MD Orthopedic Surgery, Hand Surgery Schedule an appointment as soon as possible for a visit   [place] [address]   [phone]  [phone]       [address] Emergency Department Emergency Medicine Go to   [facility] [address]  [place] [address] ED, [address], [place], [address]          Discharge Medication List as of 8/3/2020  9:23 PM      START taking these medications    Details   amoxicillin-clavulanate (AUGMENTIN) 875-125 mg per tablet Take 1 tablet by mouth every 12 (twelve) hours for 10 days, Starting Mon 8/3/2020, Until Thu 8/13/2020, Normal      naproxen (NAPROSYN) 500 mg tablet Take 1 tablet (500 mg total) by mouth 2 (two) times a day with meals, Starting Mon 8/3/2020, Normal      oxyCODONE-acetaminophen (PERCOCET) 5-325 mg per tablet Take 1 tablet by mouth every 4 (four) hours as needed for moderate pain for up to 3 daysMax Daily Amount: 6 tablets, Starting Mon 8/3/2020, Until Thu 8/6/2020, Normal         CONTINUE these medications which have NOT CHANGED    Details   Multiple Vitamins-Minerals (MULTIVITAMIN WITH MINERALS) tablet Take 1 tablet by mouth daily, Starting Fri 9/7/2018, No Print           No discharge procedures on file      PDMP Review     None          ED Provider  Electronically Signed by           Selene Jones MD  08/03/20 0291

## 2020-08-03 NOTE — PATIENT INSTRUCTIONS
As we discussed, I would recommend going to ER as there is open fracture of the left 3 middle phlanx, wound is also deep  You were agreeable  Order placed in [facility] [address]    ER notified of his arrival

## 2020-08-04 ENCOUNTER — OFFICE VISIT (OUTPATIENT)
Dept: OBGYN CLINIC | Facility: MEDICAL CENTER | Age: 75
End: 2020-08-04
Payer: MEDICARE

## 2020-08-04 VITALS — HEIGHT: 70 IN | TEMPERATURE: 98 F | WEIGHT: 251 LBS | BODY MASS INDEX: 35.93 KG/M2

## 2020-08-04 DIAGNOSIS — S62.622B OPEN DISPLACED FRACTURE OF MIDDLE PHALANX OF RIGHT MIDDLE FINGER, INITIAL ENCOUNTER: Primary | ICD-10-CM

## 2020-08-04 PROCEDURE — 99204 OFFICE O/P NEW MOD 45 MIN: CPT | Performed by: ORTHOPAEDIC SURGERY

## 2020-08-04 NOTE — PROGRESS NOTES
Chief Complaint     Left hand pain and laceration       History of Present Illness     Deena Cervantes is a 77 y o  male right-hand-dominant presents 1 day after sustaining an injury to his left hand after a hedge trimming accident  Patient presented to the ED yesterday where he was evaluated, sutured and bandaged  He was referred to Orthopedic surgery for follow-up  Patient does have history of injury to the left long finger 30 years ago resulting in a partial amputation of the tip  Lacerations are isolated to the left index, long and ring fingers  The point of maximal tenderness is along the middle phalanx of the long finger which is worse with palpation and attempted motion  Patient denies any diminished sensation, numbness or tingling  No catching, locking or popping  No other complaints  Has not started taking his oral antibiotics yet but did have a dose in the emergency room as well as a tetanus update          Past Medical History:   Diagnosis Date    Arthritis     Deafness in right ear     Elevated PSA, between 10 and less than 20 ng/ml 03/20/2018    TURP today 3/30/3028    GERD (gastroesophageal reflux disease)     Hearing aid worn     Left ear    History of URI (upper respiratory infection)     States two months ago and was on an antibiotic and was given an inhaler    [place] (hard of hearing)     Left ear    Hyperlipidemia     Knee pain, bilateral     Arthritis    Obese     Wears glasses        Past Surgical History:   Procedure Laterality Date    COLONOSCOPY      HYDROCELE EXCISION / REPAIR      Age 17    KNEE SURGERY Bilateral     Meniscus repair, bilateral     [place] TOTAL KNEE ARTHROPLASTY Bilateral 9/5/2018    Procedure: KNEE TOTAL ARTHROPLASTY;  Surgeon: Marii Felipe DO;  Location: [address] [place] [address] OR;  Service: Orthopedics    TX TRANSURETHRAL ELEC-SURG PROSTATECTOM N/A 3/30/2018    Procedure: TRANSURETHRAL RESECTION OF PROSTATE (TURP);   Surgeon: Hailey Crook MD;  [facility] OR;  Service: Urology    TONSILLECTOMY         Allergies   Allergen Reactions    Keflex [Cephalexin] Hives       Current Outpatient Medications on File Prior to Visit   Medication Sig Dispense Refill    amoxicillin-clavulanate (AUGMENTIN) 875-125 mg per tablet Take 1 tablet by mouth every 12 (twelve) hours for 10 days 20 tablet 0    naproxen (NAPROSYN) 500 mg tablet Take 1 tablet (500 mg total) by mouth 2 (two) times a day with meals 30 tablet 0    oxyCODONE-acetaminophen (PERCOCET) 5-325 mg per tablet Take 1 tablet by mouth every 4 (four) hours as needed for moderate pain for up to 3 daysMax Daily Amount: 6 tablets 10 tablet 0    Multiple Vitamins-Minerals (MULTIVITAMIN WITH MINERALS) tablet Take 1 tablet by mouth daily (Patient not taking: Reported on 2020)  0     Current Facility-Administered Medications on File Prior to Visit   Medication Dose Route Frequency Provider Last Rate Last Dose    [COMPLETED] amoxicillin-clavulanate (AUGMENTIN) 875-125 mg per tablet 1 tablet  1 tablet Oral Once Ramon Rendon MD   1 tablet at 20    [COMPLETED] lidocaine (PF) (XYLOCAINE-MPF) 1 % injection 10 mL  10 mL Infiltration Once Ramon Rendon MD   10 mL at 20    [COMPLETED] lidocaine (PF) (XYLOCAINE-MPF) 1 % injection 10 mL  10 mL Infiltration Once Ramon Rendon MD   10 mL at 20    [DISCONTINUED] tetanus-diphtheria-acellular pertussis (BOOSTRIX) IM injection 0 5 mL  0 5 mL Intramuscular Once Ramon Rendon MD           Social History     Tobacco Use    Smoking status: Former Smoker     Packs/day: 2 00     Years: 34 00     Pack years: 68 00     Last attempt to quit:      Years since quittin 6    Smokeless tobacco: Never Used   Substance Use Topics    Alcohol use: Yes     Comment: 2 monthly    Drug use: No       Family History   Problem Relation Age of Onset    Aneurysm Mother     Asthma Father        Review of Systems     As stated in the HPI   All other systems were reviewed and are negative  Physical Exam     Temp 98 °F (36 7 °C)   Ht 5' 10"   Wt 114 kg (251 lb)   BMI 36 01 kg/m²     GENERAL: This is a well-developed, well-nourished, age-appropriate patient in no acute distress  The patient is alert and oriented x3  Pleasant and cooperative  Eyes: Anicteric sclerae  Extraocular movements appear intact  HENT: Nares are patent with no drainage  Lungs: There is equal chest rise on inspection  Breathing is non-labored with no audible wheezing  Cardiovascular: No cyanosis  No upper extremity lymphadema  Skin: Skin is warm to touch  No obvious skin lesions or rashes other than described below  Neurologic: No ataxia  Psychiatric: Mood and affect are appropriate  Left upper extremity:  · Full range of motion about shoulder, elbow and wrists bilaterally  · Small finger baseline two-point discrimination is 9 mm which is also equal to the contralateral extremity  · Index finger:  Roughly 1 5 cm laceration along the radial aspect of the digit distal to the PIP joint  Intact FDS, FDP and extensor mechanism  9mm 2pt discrimination to radial and ulnar border  Brisk capillary refill <2 sec  · Long finger:  Roughly 3 cm laceration along the radial aspect of the digit distal to the PIP joint  TTP over middle phalanx  Intact FDS, FDP and extensor mechanism  9 mm 2 point discrimination on the ulnar aspect, Baseline diminished sensation to radial boarder from previous injury and about 11 mm on the radial side  Brisk capillary refill <2 sec  · Ring finger:  Roughly 1 cm laceration along the radial aspect of the digit distal to the PIP joint  Intact FDS, FDP and extensor mechanism  9mm 2pt discrimination to radial and ulnar border  Brisk capillary refill <2 sec      Data Review     Results Reviewed     None             Imaging  Radiographs of the left hand were personally reviewed today by myself    They reveal a minimally displaced fracture of the long finger phalanx  Assessment and Plan      Diagnoses and all orders for this visit:    Open displaced fracture of middle phalanx of right middle finger, initial encounter            This 77year old male with complex lacerations to the left index, long and ring fingers with open fracture of long finger middle phalanx    Nature the patient's injury was explained to him in full  Given that the patient has appropriate alignment of the middle phalanx, we would not recommend any surgical correction or stabilization at this time  That being said, patient would benefit from a course of antibiotics given that the fracture is open in nature  We encouraged him to  his antibiotics and start taking them immediately  The lacerations to the other digits can be managed with local wound care  Patient is instructed to wear a finger splint at all times except for showering    Patient understands and agrees with plan above      Follow Up:  3 weeks    To Do Next Visit:  For evaluation    PROCEDURES PERFORMED:  Procedures  No Procedures performed today

## 2020-08-05 ENCOUNTER — TELEPHONE (OUTPATIENT)
Dept: OBGYN CLINIC | Facility: HOSPITAL | Age: 75
End: 2020-08-05

## 2020-08-05 NOTE — TELEPHONE ENCOUNTER
Patient sees Dr Keith Kaba     Patient called in needing a work note stating his light duty restrictions  He will  from office  Please call when available        [phone]

## 2020-08-06 NOTE — TELEPHONE ENCOUNTER
Patient is calling to check on status of the light duty work status letter  Advised that Dr Alejandrina Parra hasn't gotten a chance to address it, we will follow up with him regarding the letter within 24-48hrs  He can  the letter when ready  Please advise      Callback LF#[phone]

## 2020-08-24 ENCOUNTER — OFFICE VISIT (OUTPATIENT)
Dept: OBGYN CLINIC | Facility: MEDICAL CENTER | Age: 75
End: 2020-08-24
Payer: MEDICARE

## 2020-08-24 VITALS
SYSTOLIC BLOOD PRESSURE: 145 MMHG | DIASTOLIC BLOOD PRESSURE: 94 MMHG | HEART RATE: 106 BPM | BODY MASS INDEX: 36.01 KG/M2 | TEMPERATURE: 97.9 F | WEIGHT: 251 LBS

## 2020-08-24 DIAGNOSIS — S62.622B OPEN DISPLACED FRACTURE OF MIDDLE PHALANX OF RIGHT MIDDLE FINGER, INITIAL ENCOUNTER: Primary | ICD-10-CM

## 2020-08-24 PROCEDURE — 99213 OFFICE O/P EST LOW 20 MIN: CPT | Performed by: ORTHOPAEDIC SURGERY

## 2020-08-24 NOTE — LETTER
August 24, 2020     Patient: Monika Quan   YOB: 1945   Date of Visit: 8/24/2020       To Whom it May Concern:    Jessica Stovall is under my professional care  He was seen in my office on 8/24/2020  He may return to work without restrictions  If you have any questions or concerns, please don't hesitate to call           Sincerely,          Marii Smyth MD        CC: No Recipients

## 2020-08-24 NOTE — PROGRESS NOTES
Chief Complaint     Left hand lacerations     History of Present Illness     Luz Marina Eason is a 77 y o  male presents the office today for follow-up evaluation of his left index, long, and ring finger lacerations as well as the left long finger middle phalanx open fracture  Patient states he is overall doing well today  He denies any significant pain  He states he has been covering his hand with a rubber glove for showers daily  After the shower he does wash his hand with antiseptic  He notes he has been covering the lacerated areas with a Band-Aid  He notes he has discontinued use of the splint on the long finger for approximately 3 days now  He notes he has full range of motion of all his fingers  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 [employer]  He is interested in returning full duty  He denies any new injuries  He denies any numbness and tingling  Past Medical History:   Diagnosis Date    Arthritis     Deafness in right ear     Elevated PSA, between 10 and less than 20 ng/ml 03/20/2018    TURP today 3/30/3028    GERD (gastroesophageal reflux disease)     Hearing aid worn     Left ear    History of URI (upper respiratory infection)     States two months ago and was on an antibiotic and was given an inhaler    [place] (hard of hearing)     Left ear    Hyperlipidemia     Knee pain, bilateral     Arthritis    Obese     Wears glasses        Past Surgical History:   Procedure Laterality Date    COLONOSCOPY      HYDROCELE EXCISION / REPAIR      Age 17    KNEE SURGERY Bilateral     Meniscus repair, bilateral     [place] TOTAL KNEE ARTHROPLASTY Bilateral 9/5/2018    Procedure: KNEE TOTAL ARTHROPLASTY;  Surgeon: Glenis Bacon DO;  Location: [facility] MAIN OR;  Service: Orthopedics    MI TRANSURETHRAL ELEC-SURG PROSTATECTOM N/A 3/30/2018    Procedure: TRANSURETHRAL RESECTION OF PROSTATE (TURP);   Surgeon: Manny Melissa MD;  Location: [facility];  Service: Urology  TONSILLECTOMY         Allergies   Allergen Reactions    Keflex [Cephalexin] Hives       Current Outpatient Medications on File Prior to Visit   Medication Sig Dispense Refill    Multiple Vitamins-Minerals (MULTIVITAMIN WITH MINERALS) tablet Take 1 tablet by mouth daily  0    naproxen (NAPROSYN) 500 mg tablet Take 1 tablet (500 mg total) by mouth 2 (two) times a day with meals 30 tablet 0     No current facility-administered medications on file prior to visit  Social History     Tobacco Use    Smoking status: Former Smoker     Packs/day: 2 00     Years: 34 00     Pack years: 68 00     Last attempt to quit:      Years since quittin 6    Smokeless tobacco: Never Used   Substance Use Topics    Alcohol use: Yes     Comment: 2 monthly    Drug use: No       Family History   Problem Relation Age of Onset    Aneurysm Mother     Asthma Father        Review of Systems     As stated in the HPI  All other systems were reviewed and are negative  Physical Exam     /94   Pulse (!) 106   Temp 97 9 °F (36 6 °C)   Wt 114 kg (251 lb)   BMI 36 01 kg/m²     GENERAL: This is a well-developed, well-nourished, age-appropriate patient in no acute distress  The patient is alert and oriented x3  Pleasant and cooperative  Eyes: Anicteric sclerae  Extraocular movements appear intact  HENT: Nares are patent with no drainage  Lungs: There is equal chest rise on inspection  Breathing is non-labored with no audible wheezing  Cardiovascular: No cyanosis  No upper extremity lymphadema  Skin: Skin is warm to touch  No obvious skin lesions or rashes other than described below  Neurologic: No ataxia  Psychiatric: Mood and affect are appropriate  Left hand  Healing lacerations of the index, long, and ring finger with eschar    Dissolvable sutures are intact  No surrounding erythema or ecchymosis noted  No drainage noted  Nontender to palpation  DPC 0  No rotational deformity  Sensation intact to the radial, ulnar, and median distribution as well as the radial and ulnar aspect of the index, long, and ring finger  Brisk capillary refill noted to all digits    Data Review     Results Reviewed     None             Imaging:  None today    Assessment and Plan      Diagnoses and all orders for this visit:    Open displaced fracture of middle phalanx of right middle finger, initial encounter         78-year-old male with lacerations to the left index, long, and ring finger with open fracture to along finger middle phalanx  Patient is healing well  Discussed with him that he should discontinue use of the anti septic and wash the hands with normal soap and water  He may get the hand wet in the shower  After showers he should pat the fingers dry and apply Neosporin and Band-Aids  He may return to his job full duty  A work note today was provided to him stating this  I will see him back in 4 weeks time if he is unhappy with his healing       Follow Up:  p r n /4 weeks    To Do Next Visit:  Wound check if needed    PROCEDURES PERFORMED:  Procedures  No Procedures performed today      Scribe Attestation    I,:   Seun Olson MA am acting as a scribe while in the presence of the attending physician :        I,:   Carolynn Rey MD personally performed the services described in this documentation    as scribed in my presence :

## 2021-03-10 DIAGNOSIS — Z23 ENCOUNTER FOR IMMUNIZATION: ICD-10-CM

## 2021-08-16 ENCOUNTER — TELEPHONE (OUTPATIENT)
Dept: OBGYN CLINIC | Facility: CLINIC | Age: 76
End: 2021-08-16

## 2021-08-17 NOTE — TELEPHONE ENCOUNTER
Patient sees Dr Logan    Pt called back and stated he is doing great and doesn't need an appt  , his knees are doing Brigette Mike

## 2021-10-04 ENCOUNTER — OFFICE VISIT (OUTPATIENT)
Dept: OBGYN CLINIC | Facility: CLINIC | Age: 76
End: 2021-10-04
Payer: MEDICARE

## 2021-10-04 VITALS
DIASTOLIC BLOOD PRESSURE: 85 MMHG | SYSTOLIC BLOOD PRESSURE: 162 MMHG | HEART RATE: 96 BPM | HEIGHT: 70 IN | BODY MASS INDEX: 38.17 KG/M2

## 2021-10-04 DIAGNOSIS — Z96.652 S/P TOTAL KNEE REPLACEMENT, LEFT: ICD-10-CM

## 2021-10-04 DIAGNOSIS — Z96.651 STATUS POST RIGHT KNEE REPLACEMENT: Primary | ICD-10-CM

## 2021-10-04 PROCEDURE — 99214 OFFICE O/P EST MOD 30 MIN: CPT | Performed by: ORTHOPAEDIC SURGERY

## 2021-12-16 NOTE — PLAN OF CARE
Problem: PHYSICAL THERAPY ADULT  Goal: Performs mobility at highest level of function for planned discharge setting  See evaluation for individualized goals  Treatment/Interventions: Functional transfer training, LE strengthening/ROM, Therapeutic exercise, Endurance training, Equipment eval/education, Bed mobility, Gait training, Compensatory technique education, Spoke to nursing  Yann Garner, PT            See flowsheet documentation for full assessment, interventions and recommendations  Outcome: Progressing  Prognosis: Excellent  Problem List: Decreased strength, Decreased range of motion, Decreased endurance, Impaired balance, Decreased mobility, Decreased safety awareness, Impaired hearing, Orthopedic restrictions, Decreased skin integrity  Assessment: Pt is 68 y o  male seen for PT evaluation s/p admit to [address] [place] [address] ICU on 9/5/2018 w/ Primary osteoarthritis of both knees  PT consulted to assess pt's functional mobility and d/c needs  Order placed for PT eval and tx, w/ up and OOB as tolerated and WBAT B LE order  Comorbidities affecting pt's physical performance at time of assessment include: instability, weakness, and numbness BLE's, post-op anemia, obesity  PTA, pt was independent w/ all functional mobility w/ o AD and lives w/ spouse in one level mobile home  Personal factors affecting pt at time of IE include: ambulating w/ assistive device, stairs to enter home, inability to ambulate household distances, unable to perform dynamic tasks in community, inability to perform IADLs and inability to perform ADLs  Please find objective findings from PT assessment regarding body systems outlined above with impairments and limitations including weakness, decreased ROM, impaired balance, decreased endurance, impaired coordination, gait deviations, decreased activity tolerance, decreased functional mobility tolerance, decreased safety awareness and decreased skin integrity   The following objective [FreeTextEntry1] : Assessment \par Echo Lvef 55% 2+,AI\par MILD AS \par MILD MR \par was off statin for a period is now back on taget ldl <100  measures performed on IE also reveal limitations: Barthel Index: 35/100  Pt's clinical presentation is currently unstable/unpredictable  Pt to benefit from continued PT tx to address deficits as defined above and maximize level of functional independent mobility and consistency  From PT/mobility standpoint, recommendation at time of d/c would be STR pending progress in order to facilitate return to PLOF  Recommendation: Short-term skilled PT     PT - OK to Discharge: No    See flowsheet documentation for full assessment     Tristan Bhatt PT

## 2022-10-10 ENCOUNTER — OFFICE VISIT (OUTPATIENT)
Dept: OBGYN CLINIC | Facility: CLINIC | Age: 77
End: 2022-10-10
Payer: MEDICARE

## 2022-10-10 VITALS
HEIGHT: 70 IN | SYSTOLIC BLOOD PRESSURE: 145 MMHG | WEIGHT: 253 LBS | BODY MASS INDEX: 36.22 KG/M2 | HEART RATE: 101 BPM | DIASTOLIC BLOOD PRESSURE: 80 MMHG

## 2022-10-10 DIAGNOSIS — Z96.653 S/P TOTAL KNEE ARTHROPLASTY, BILATERAL: Primary | ICD-10-CM

## 2022-10-10 PROCEDURE — 99213 OFFICE O/P EST LOW 20 MIN: CPT | Performed by: ORTHOPAEDIC SURGERY

## 2022-10-10 NOTE — PROGRESS NOTES
Assessment:   Diagnosis ICD-10-CM Associated Orders   1  S/P total knee arthroplasty, bilateral  [id number] XR knee 3 vw right non injury     XR knee 3 vw left non injury       Plan:  Reviewed today's physical exam findings and x-ray findings with patient at time of visit  X-Ray of bilateral knee taken on 10/10/2022 were reviewed and showed well-seated total knee prosthesis with maintained anatomical alignment and no signs of loosening  Patient is s/p bilateral total knee arthroplasty  Patient is doing well with no complaints at this time  Incision is well healed with no signs of infection or drainage  He will be seen for follow-up in 1 year for re-evaluation and repeat x-rays  Patient expresses understanding and is in agreement with this treatment plan  The patient was given the opportunity to ask questions or present concerns  The patient is doing quite well from his bilateral total knee replacements from 4 years ago  There is full strength full motion  Incisions clean dry  No effusion  X-rays show anatomic placement of prosthesis  Continue home exercise program   Continue stretching  Follow up in 1 year for re-evaluation with new x-rays of bilateral knees-three views each  If his condition changes, he will not hesitate to let us know    To do next visit:  Return in about 1 year (around 10/10/2023) for Return and repeat x-rays  The above stated was discussed in layman's terms and the patient expressed understanding  All questions were answered to the patient's satisfaction  Scribe Attestation    I,:  John More am acting as a scribe while in the presence of the attending physician :       I,:  Toma Bloom, DO personally performed the services described in this documentation    as scribed in my presence :             Subjective: Luz Marina Eason is a 69 y o  male who presents today for a follow-up evaluation of his bilateral knee  Patient is s/p bilateral total knee arthroplasty  Patient is doing well with no complaints at this time  Incision is well healed with no signs of infection or drainage  He denies any recent bruising, numbness, tingling, or feelings of instability  He also denies any fevers, chills or shortness of breath  Review of systems negative unless otherwise specified in HPI  Review of Systems   Constitutional: Negative for chills and fever  HENT: Negative for ear pain and sore throat  Eyes: Negative for pain and visual disturbance  Respiratory: Negative for cough and shortness of breath  Cardiovascular: Negative for chest pain and palpitations  Gastrointestinal: Negative for abdominal pain and vomiting  Genitourinary: Negative for dysuria and hematuria  Musculoskeletal: Negative for arthralgias and back pain  Skin: Negative for color change and rash  Neurological: Negative for seizures and syncope  All other systems reviewed and are negative  Past Medical History:   Diagnosis Date   • Arthritis    • Deafness in right ear    • Elevated PSA, between 10 and less than 20 ng/ml 03/20/2018    TURP today 3/30/3028   • GERD (gastroesophageal reflux disease)    • Hearing aid worn     Left ear   • History of URI (upper respiratory infection)     States two months ago and was on an antibiotic and was given an inhaler   • [place] (hard of hearing)     Left ear   • Hyperlipidemia    • Knee pain, bilateral     Arthritis   • Obese    • Wears glasses        Past Surgical History:   Procedure Laterality Date   • COLONOSCOPY     • HYDROCELE EXCISION / REPAIR      Age 16   • KNEE SURGERY Bilateral     Meniscus repair, bilateral    • [place] TOTAL KNEE ARTHROPLASTY Bilateral 9/5/2018    Procedure: KNEE TOTAL ARTHROPLASTY;  Surgeon: Yoandy Houston DO;  Location: [address] [place] [address] MAIN OR;  Service: Orthopedics   • [place] TRANSURETHRAL ELEC-SURG PROSTATECTOM N/A 3/30/2018    Procedure: TRANSURETHRAL RESECTION OF PROSTATE (TURP);   Surgeon: Jyothi Stevenson MD;  [facility] OR;  Service: Urology   • TONSILLECTOMY         Family History   Problem Relation Age of Onset   • Aneurysm Mother    • Asthma Father        Social History     Occupational History   • Not on file   Tobacco Use   • Smoking status: Former Smoker     Packs/day: 2 00     Years: 34 00     Pack years: 68 00     Quit date:      Years since quittin 7   • Smokeless tobacco: Never Used   Vaping Use   • Vaping Use: Never used   Substance and Sexual Activity   • Alcohol use: Not Currently     Comment: 2 monthly   • Drug use: No   • Sexual activity: Not Currently         Current Outpatient Medications:   •  ciprofloxacin-dexamethasone (CIPRODEX) otic suspension, Administer 4 drops into the left ear 2 (two) times a day for 14 days, Disp: 7 5 mL, Rfl: 1  •  fluticasone (FLONASE) 50 mcg/act nasal spray, 2 sprays into each nostril daily (Patient not taking: Reported on 10/10/2022), Disp: 1 Bottle, Rfl: 11  •  mometasone (ELOCON) 0 1 % lotion, 2 drops to the left ear canal twice daily x 14 days (Patient not taking: Reported on 10/10/2022), Disp: 30 mL, Rfl: 1  •  Multiple Vitamins-Minerals (MULTIVITAMIN WITH MINERALS) tablet, Take 1 tablet by mouth daily (Patient not taking: Reported on 10/10/2022), Disp: , Rfl: 0  •  naproxen (NAPROSYN) 500 mg tablet, Take 1 tablet (500 mg total) by mouth 2 (two) times a day with meals (Patient not taking: Reported on 10/10/2022), Disp: 30 tablet, Rfl: 0    Allergies   Allergen Reactions   • Keflex [Cephalexin] Hives          Vitals:    10/10/22 1321   BP: 145/80   Pulse: 101       Objective:                    Ortho Exam  bilateral knee(s) -   Patient ambulates with steady gait pattern  Uses No assistive device  No anatomical deformity  Skin is warm and dry to touch with no signs of erythema, ecchymosis, or infection  No soft tissue swelling or effusion noted  ROM (0° - 125°)  MMT: 5/5 throughout  No tenderness to palpation on exam  Flexor and extensor mechanisms are intact   Knee is stable to varus and valgus stress  - Lachman's  - Anterior Drawer, - Posterior Drawer  - Medial Denisa's, - Lateral Denisa's  - Pivot Shift  Patella tracks centrally without palpable crepitus  Calf compartments are soft and supple  - Nancie's sign  2+ DP and PT pulses with brisk capillary refill to the toes  Sural, saphenous, tibial, superficial, and deep peroneal motor and sensory distributions intact  Sensation light touch intact distally    Diagnostics, reviewed and taken today if performed as documented: The attending physician has personally reviewed the pertinent films in PACS and interpretation is as follows:    X-Ray of bilateral knee taken on 10/10/2022 were reviewed and showed well-seated total knee prosthesis with maintained anatomical alignment and no signs of loosening  Procedures, if performed today:    None performed    Portions of the record may have been created with voice recognition software  Occasional wrong word or "sound a like" substitutions may have occurred due to the inherent limitations of voice recognition software  Read the chart carefully and recognize, using context, where substitutions have occurred

## 2024-04-22 VITALS
SYSTOLIC BLOOD PRESSURE: 118 MMHG | WEIGHT: 253 LBS | HEART RATE: 107 BPM | HEIGHT: 70 IN | DIASTOLIC BLOOD PRESSURE: 70 MMHG | BODY MASS INDEX: 36.22 KG/M2

## 2024-04-22 DIAGNOSIS — Z96.653 S/P TOTAL KNEE ARTHROPLASTY, BILATERAL: Primary | ICD-10-CM

## 2024-04-22 PROCEDURE — 99213 OFFICE O/P EST LOW 20 MIN: CPT | Performed by: ORTHOPAEDIC SURGERY

## 2024-04-22 RX ORDER — AMLODIPINE BESYLATE 10 MG/1
10 TABLET ORAL DAILY
COMMUNITY
Start: 2024-03-21

## 2024-04-22 RX ORDER — LISINOPRIL 10 MG/1
TABLET ORAL
COMMUNITY
Start: 2024-04-22

## 2024-04-22 NOTE — PROGRESS NOTES
ASSESSMENT/PLAN:    Diagnoses and all orders for this visit:    S/P total knee arthroplasty, bilateral  -     XR knee 3 vw left non injury; Future  -     XR knee 3 vw right non injury; Future    Other orders  -     lisinopril (ZESTRIL) 10 mg tablet  -     amLODIPine (NORVASC) 10 mg tablet; Take 10 mg by mouth daily        X-rays of both of the patient's knees are consistent with well-seated bilateral total knee replacements.  The prostheses are in good alignment and there are no signs of loosening.  Possible treatment options were discussed with the patient.  He may perform home exercise program and take over-the-counter anti-inflammatories as needed.  He will follow-up with our office in 1 year with new x-rays of both knees 3 views each.  The patient is acceptable to this plan.    Return in about 1 year (around 4/22/2025) for Annual Recheck.    The patient is doing quite well from his bilateral total knee replacements.  Strength and motion intact.  No instability.  X-rays show well-seated knee replacements without any loosening.  Continue home exercise program.  Follow-up 12 months evaluation with new x-rays of bilateral knees-3 views each      _____________________________________________________  CHIEF COMPLAINT:  Chief Complaint   Patient presents with    Left Knee - Pain         SUBJECTIVE:  Jermaine Jain is a 78 y.o. male who presents to our office for a follow-up visit.  The patient has a history of bilateral total knee replacements from 9/5/2018.  He complains of occasional lateral left knee pain, especially at night.  He denies any numbness or tingling.  He denies any fever or chills.  He is ambulating without antalgic gait or an assist device.    The following portions of the patient's history were reviewed and updated as appropriate: allergies, current medications, past family history, past medical history, past social history, past surgical history and problem list.    PAST MEDICAL HISTORY:  Past  Medical History:   Diagnosis Date    Arthritis     Deafness in right ear     Elevated PSA, between 10 and less than 20 ng/ml 2018    TURP today 3/30/3028    GERD (gastroesophageal reflux disease)     Hearing aid worn     Left ear    History of URI (upper respiratory infection)     States two months ago and was on an antibiotic and was given an inhaler    [place] (hard of hearing)     Left ear    Hyperlipidemia     Knee pain, bilateral     Arthritis    Obese     Wears glasses        PAST SURGICAL HISTORY:  Past Surgical History:   Procedure Laterality Date    COLONOSCOPY      HYDROCELE EXCISION / REPAIR      Age 13    KNEE SURGERY Bilateral     Meniscus repair, bilateral.    AK ARTHRP KNE CONDYLE&PLATU MEDIAL&LAT COMPARTMENTS Bilateral 2018    Procedure: KNEE TOTAL ARTHROPLASTY;  Surgeon: Navarro Logan DO;  Location:  MAIN OR;  Service: Orthopedics    [place] TRURL ELECTROSURG RESCJ PROSTATE BLEED COMPLETE N/A 3/30/2018    Procedure: TRANSURETHRAL RESECTION OF PROSTATE (TURP);  Surgeon: Tal Siu MD;  Location: [facility];  Service: Urology    TONSILLECTOMY         FAMILY HISTORY:  Family History   Problem Relation Age of Onset    Aneurysm Mother     Asthma Father        SOCIAL HISTORY:  Social History     Tobacco Use    Smoking status: Former     Current packs/day: 0.00     Average packs/day: 2.0 packs/day for 34.0 years (68.0 ttl pk-yrs)     Types: Cigarettes     Start date:      Quit date:      Years since quittin.3    Smokeless tobacco: Never   Vaping Use    Vaping status: Never Used   Substance Use Topics    Alcohol use: Not Currently     Comment: 2 monthly    Drug use: No       MEDICATIONS:    Current Outpatient Medications:     amLODIPine (NORVASC) 10 mg tablet, Take 10 mg by mouth daily, Disp: , Rfl:     lisinopril (ZESTRIL) 10 mg tablet, , Disp: , Rfl:     ciprofloxacin-dexamethasone (CIPRODEX) otic suspension, Administer 4 drops into the left ear 2 (two) times a day for 14 days,  "Disp: 7.5 mL, Rfl: 1    fluticasone (FLONASE) 50 mcg/act nasal spray, 2 sprays into each nostril daily (Patient not taking: Reported on 10/10/2022), Disp: 1 Bottle, Rfl: 11    mometasone (ELOCON) 0.1 % lotion, 2 drops to the left ear canal twice daily x 14 days (Patient not taking: Reported on 10/10/2022), Disp: 30 mL, Rfl: 1    Multiple Vitamins-Minerals (MULTIVITAMIN WITH MINERALS) tablet, Take 1 tablet by mouth daily (Patient not taking: Reported on 10/10/2022), Disp: , Rfl: 0    naproxen (NAPROSYN) 500 mg tablet, Take 1 tablet (500 mg total) by mouth 2 (two) times a day with meals (Patient not taking: Reported on 10/10/2022), Disp: 30 tablet, Rfl: 0    ALLERGIES:  Allergies   Allergen Reactions    Keflex [Cephalexin] Hives       ROS:  Review of Systems     Constitutional: Negative for fatigue, fever or loss of appetite.   HENT: Negative.    Respiratory: Negative for shortness of breath, dyspnea.    Cardiovascular: Negative for chest pain/tightness.   Gastrointestinal: Negative for abdominal pain, N/V.   Endocrine: Negative for cold/heat intolerance, unexplained weight loss/gain.   Genitourinary: Negative for flank pain, dysuria, hematuria.   Musculoskeletal: Negative for arthralgia   Skin: Negative for rash.    Neurological: Negative for numbness or tingling  Psychiatric/Behavioral: Negative for agitation.  _____________________________________________________  PHYSICAL EXAMINATION:    Blood pressure 118/70, pulse (!) 107, height 5' 10\" (1.778 m), weight 115 kg (253 lb).    Constitutional: Oriented to person, place, and time. Appears well-developed and well-nourished. No distress.   HENT:   Head: Normocephalic.   Eyes: Conjunctivae are normal. Right eye exhibits no discharge. Left eye exhibits no discharge. No scleral icterus.   Cardiovascular: Normal rate.    Pulmonary/Chest: Effort normal.   Neurological: Alert and oriented to person, place, and time.   Skin: Skin is warm and dry. No rash noted. Not " "diaphoretic. No erythema. No pallor.   Psychiatric: Normal mood and affect. Behavior is normal. Judgment and thought content normal.      MUSCULOSKELETAL EXAMINATION:   Physical Exam  Ortho Exam    Bilateral lower extremities are neurovascularly intact  Toes are pink and mobile  Compartments are soft  Range of motion both knees are from 0 to 120 degrees  No ligament laxity  Brisk cap refill and sensation intact  Incisions are well-healed    Objective:  BP Readings from Last 1 Encounters:   04/22/24 118/70      Wt Readings from Last 1 Encounters:   04/22/24 115 kg (253 lb)        BMI:   Estimated body mass index is 36.3 kg/m² as calculated from the following:    Height as of this encounter: 5' 10\" (1.778 m).    Weight as of this encounter: 115 kg (253 lb).        Scribe Attestation      I,:  Tono Darby PA-C am acting as a scribe while in the presence of the attending physician.:       I,:  Navarro Logan, DO personally performed the services described in this documentation    as scribed in my presence.:            "

## (undated) DEVICE — 1840 FOAM BLOCK NEEDLE COUNTER: Brand: DEVON

## (undated) DEVICE — GLOVE SRG BIOGEL 8

## (undated) DEVICE — EVACUATOR BLADDER ELLIK DISP STRL

## (undated) DEVICE — CLEANER,CAUTERY TIP,2X2",STERILE: Brand: MEDLINE

## (undated) DEVICE — MEDI-VAC YANK SUCT HNDL W/TPRD BULBOUS TIP: Brand: CARDINAL HEALTH

## (undated) DEVICE — CAPIT KNEE GSF FLX CEM FEM/CEM TIB/FLX SURF/STD PAT - ZIMMER

## (undated) DEVICE — GLOVE SRG BIOGEL 7.5

## (undated) DEVICE — SAW BLADE RECIPROCATING 179

## (undated) DEVICE — SCRUB SPONGE DURAPREP W/APPLI

## (undated) DEVICE — 3M™ IOBAN™ 2 ANTIMICROBIAL INCISE DRAPE 6651EZ: Brand: IOBAN™ 2

## (undated) DEVICE — GLOVE INDICATOR PI UNDERGLOVE SZ 6.5 BLUE

## (undated) DEVICE — TRANSPOSAL ULTRAFLEX DUO/QUAD ULTRA CART MANIFOLD

## (undated) DEVICE — STOCKINETTE REGULAR

## (undated) DEVICE — TUBING SUCTION 5MM X 12 FT

## (undated) DEVICE — GLOVE SRG BIOGEL ECLIPSE 6.5

## (undated) DEVICE — COBAN 6 IN STERILE

## (undated) DEVICE — THE SIMPULSE SOLO SYSTEM WITH ULTREX RETRACTABLE SPLASH SHIELD TIP: Brand: SIMPULSE SOLO

## (undated) DEVICE — UROCATCH BAG

## (undated) DEVICE — DRAPE,U/ SHT,SPLIT,PLAS,STERIL: Brand: MEDLINE

## (undated) DEVICE — 3M™ COBAN™ NL STERILE NON-LATEX SELF-ADHERENT WRAP, 2084S, 4 IN X 5 YD (10 CM X 4,5 M), 18 ROLLS/CASE: Brand: 3M™ COBAN™

## (undated) DEVICE — FRAZIER SUCTION INSTRUMENT 18 FR W/OBTURATOR, NO CONTROL VENT: Brand: FRAZIER

## (undated) DEVICE — BLADE OSC STD/WIDE 0.89MM THCK

## (undated) DEVICE — INTENDED FOR TISSUE SEPARATION, AND OTHER PROCEDURES THAT REQUIRE A SHARP SURGICAL BLADE TO PUNCTURE OR CUT.: Brand: BARD-PARKER ® CARBON RIB-BACK BLADES

## (undated) DEVICE — INVIEW CLEAR LEGGINGS: Brand: CONVERTORS

## (undated) DEVICE — ACE WRAP 6 IN UNSTERILE

## (undated) DEVICE — Device

## (undated) DEVICE — CONNECTOR PH 6-IN-1 Y ST: Brand: CARDINAL HEALTH

## (undated) DEVICE — GLOVE INDICATOR UNDERGLOVE SZ 8 GREEN

## (undated) DEVICE — 3M™ DURAPORE™ SURGICAL TAPE 1538-3, 3 INCH X 10 YARD (7,5CM X 9,1M), 4 ROLLS/BOX: Brand: 3M™ DURAPORE™

## (undated) DEVICE — 3M™ STERI-STRIP™ COMPOUND BENZOIN TINCTURE 40 BAGS/CARTON 4 CARTONS/CASE C1544: Brand: 3M™ STERI-STRIP™

## (undated) DEVICE — DRESSING XEROFORM 5 X 9

## (undated) DEVICE — COVER,TABLE,44X90,STERILE: Brand: MEDLINE

## (undated) DEVICE — BASIC SINGLE BASIN-LF: Brand: MEDLINE INDUSTRIES, INC.

## (undated) DEVICE — GLOVE INDICATOR UNDERGLOVE SZ 7.5 GREEN

## (undated) DEVICE — READY WET SKIN SCRUB TRAY-LF: Brand: MEDLINE INDUSTRIES, INC.

## (undated) DEVICE — BAG URINE DRAINAGE 4000ML CONTINUOUS IRR

## (undated) DEVICE — NO-SCRATCH ™ SMALL WHITNEY CURETTE ™ IS A SINGLE-USE, PLASTIC CURETTE FOR QUICKLY APPLYING, MANIPULATING AND REMOVING BONE CEMENT DURING HIP AND KNEE REPLACEMENT SURGERY. THE PLASTIC IS SOFTER THAN STEEL INSTRUMENTS, REDUCING THE RISK OF DAMAGING THE PROSTHESIS WITH METAL INSTRUMENTS.  THE CURETTE’S 6MM TIP REMOVES EXCESS CEMENT FROM REPLACEMENT HIPS AND KNEES. EASY-TO-MANEUVER, THE SMALL BLUE CURETTE LETS YOU REMOVE CEMENT FROM ALL EDGES OF THE PROSTHESIS.NO-SCRATCH WHITNEY SMALL CURETTE FEATURES:SAFER THAN STEEL- MADE OF PLASTIC - STURDY YET SOFTER THAN SURGICAL STEEL.HANDIER- EACH TOOL HAS A MOLDED-IN THUMB INDENTATION INSTANTLY ORIENTING THE TOOL.- EASIER TO MANEUVER IN HARD TO SEE PLACES.- COLOR-CODED FOR EASY IDENTIFICATION.FASTER- COMES INDIVIDUALLY PACKAGED IN STERILE, PEEL OPEN POUCH, READY TO GO.- APPLIES, MANIPULATES, OR REMOVES CEMENT WITH FINGERTIP PRECISION.ECONOMICAL- THE COST OF A SINGLE REVISION DWARFS THE COST OF A SINGLE-USE CURETTE. - DISPOSABLE – THERE’S NO NEED TO WASTE TIME REMOVING HARDENED CEMENT OR RE-STERILIZING TOOLS.- LESS EXPENSIVE TO BUY AND INVENTORY - ORDER ONLY THE TOOL YOU USE.- PACKAGED 25 INDIVIDUALLY WRAPPED TOOLS TO A CARTON FOR CONVENIENT SHELF STORAGE.: Brand: WHITNEY NO-SCRATCH CURETTE (SMALL)

## (undated) DEVICE — ABDOMINAL PAD: Brand: DERMACEA

## (undated) DEVICE — Device: Brand: OLYMPUS

## (undated) DEVICE — GLOVE SRG BIOGEL 5.5

## (undated) DEVICE — COMFORT HOOD -75 EA/BX: Brand: CARDINAL HEALTH

## (undated) DEVICE — BANDAGE,ELASTIC,ESMARK,STERILE,6"X9',LF: Brand: MEDLINE

## (undated) DEVICE — ALL PURPOSE SPONGES,NON-WOVEN, 4 PLY: Brand: CURITY

## (undated) DEVICE — NEEDLE 18 G X 1 1/2 SAFETY

## (undated) DEVICE — CAPIT KNEE CEM FEM/ CEM TIBIA/STD SURF/STD PAT-ZIMMER

## (undated) DEVICE — GLOVE SRG BIOGEL 7

## (undated) DEVICE — GROUNDING PAD UNIVERSAL SLW

## (undated) DEVICE — SUT VICRYL 0 CP-1 27 IN J267H

## (undated) DEVICE — PENCIL ROCKER SWITCH CAUTERY HAND CONTROL

## (undated) DEVICE — SYRINGE 50ML LL

## (undated) DEVICE — TOTAL KNEE CDS: Brand: MEDLINE INDUSTRIES, INC.

## (undated) DEVICE — BLOOD CONSERVATION SYSTEM WITH QUICK DISCONNECT AND PVC DRAIN: Brand: CBCII CONSTAVAC

## (undated) DEVICE — STAPLER SKIN 35 WIDE ULC APPOSE

## (undated) DEVICE — CATHETER PLUG WITH CAP: Brand: DOVER

## (undated) DEVICE — DRAPE, EXTREMITY, BILATERAL, STERILE: Brand: MEDLINE

## (undated) DEVICE — SUT VICRYL 2-0 CP-1 27 IN J266H

## (undated) DEVICE — CYSTO TUBING TUR Y IRRIGATION

## (undated) DEVICE — ASTOUND IMPERVIOUS SURGICAL GOWN: Brand: CONVERTORS

## (undated) DEVICE — TOWEL SURG XR DETECT GREEN STRL RFD

## (undated) DEVICE — PACK TUR

## (undated) DEVICE — PADDING CAST 6IN COTTON STRL

## (undated) DEVICE — GLOVE INDICATOR UNDERGLOVE SZ 7 GREEN

## (undated) DEVICE — 3M™ STERI-DRAPE™ U-DRAPE 1015: Brand: STERI-DRAPE™

## (undated) DEVICE — SKIN MARKER DUAL TIP WITH RULER CAP, FLEXIBLE RULER AND LABELS: Brand: DEVON

## (undated) DEVICE — SPONGE LAP 18 X 18 IN STRL RFD

## (undated) DEVICE — SUT VICRYL 1 CP 27 IN J196H

## (undated) DEVICE — LIGHT GLOVE GREEN

## (undated) DEVICE — SCD SEQUENTIAL COMPRESSION COMFORT SLEEVE MEDIUM KNEE LENGTH: Brand: KENDALL SCD